# Patient Record
Sex: MALE | Race: WHITE | Employment: OTHER | URBAN - METROPOLITAN AREA
[De-identification: names, ages, dates, MRNs, and addresses within clinical notes are randomized per-mention and may not be internally consistent; named-entity substitution may affect disease eponyms.]

---

## 2017-03-29 ENCOUNTER — TELEPHONE (OUTPATIENT)
Dept: INTERNAL MEDICINE CLINIC | Age: 82
End: 2017-03-29

## 2017-03-29 DIAGNOSIS — E78.00 PURE HYPERCHOLESTEROLEMIA: ICD-10-CM

## 2017-03-29 DIAGNOSIS — I77.9 BILATERAL CAROTID ARTERY DISEASE (HCC): Primary | ICD-10-CM

## 2017-03-29 DIAGNOSIS — I25.10 CORONARY ARTERY DISEASE INVOLVING NATIVE CORONARY ARTERY OF NATIVE HEART WITHOUT ANGINA PECTORIS: ICD-10-CM

## 2017-03-29 NOTE — TELEPHONE ENCOUNTER
Pt requesting that the lab slip be faxed pt requesting lab slip be sent the wk before appt on Thursday, April 13, 2017 01:45 PM     Fax# [phone]

## 2017-03-30 DIAGNOSIS — R97.20 ABNORMAL PSA: ICD-10-CM

## 2017-03-30 DIAGNOSIS — M79.10 MYALGIA: Primary | ICD-10-CM

## 2017-03-30 NOTE — TELEPHONE ENCOUNTER
The patient has an appt with the urologist the following week after seeing Dr Martin Frey. Faxed the lab slips to the patient's home as requested.

## 2017-04-05 LAB
ALBUMIN SERPL-MCNC: 4.1 G/DL (ref 3.5–4.7)
ALBUMIN/GLOB SERPL: 1.6 {RATIO} (ref 1.2–2.2)
ALP SERPL-CCNC: 52 IU/L (ref 39–117)
ALT SERPL-CCNC: 24 IU/L (ref 0–44)
AST SERPL-CCNC: 26 IU/L (ref 0–40)
BASOPHILS # BLD AUTO: 0 X10E3/UL (ref 0–0.2)
BASOPHILS NFR BLD AUTO: 1 %
BILIRUB SERPL-MCNC: 1.5 MG/DL (ref 0–1.2)
BUN SERPL-MCNC: 15 MG/DL (ref 8–27)
BUN/CREAT SERPL: 20 (ref 10–24)
CALCIUM SERPL-MCNC: 9.1 MG/DL (ref 8.6–10.2)
CHLORIDE SERPL-SCNC: 103 MMOL/L (ref 96–106)
CHOLEST SERPL-MCNC: 159 MG/DL (ref 100–199)
CK SERPL-CCNC: 66 U/L (ref 24–204)
CO2 SERPL-SCNC: 26 MMOL/L (ref 18–29)
CREAT SERPL-MCNC: 0.74 MG/DL (ref 0.76–1.27)
EOSINOPHIL # BLD AUTO: 0.2 X10E3/UL (ref 0–0.4)
EOSINOPHIL NFR BLD AUTO: 4 %
ERYTHROCYTE [DISTWIDTH] IN BLOOD BY AUTOMATED COUNT: 13.8 % (ref 12.3–15.4)
GLOBULIN SER CALC-MCNC: 2.5 G/DL (ref 1.5–4.5)
GLUCOSE SERPL-MCNC: 92 MG/DL (ref 65–99)
HCT VFR BLD AUTO: 42.7 % (ref 37.5–51)
HDLC SERPL-MCNC: 109 MG/DL
HGB BLD-MCNC: 14 G/DL (ref 12.6–17.7)
IMM GRANULOCYTES # BLD: 0 X10E3/UL (ref 0–0.1)
IMM GRANULOCYTES NFR BLD: 0 %
INTERPRETATION, 910389: NORMAL
LDLC SERPL CALC-MCNC: 39 MG/DL (ref 0–99)
LYMPHOCYTES # BLD AUTO: 1.6 X10E3/UL (ref 0.7–3.1)
LYMPHOCYTES NFR BLD AUTO: 34 %
MCH RBC QN AUTO: 33.3 PG (ref 26.6–33)
MCHC RBC AUTO-ENTMCNC: 32.8 G/DL (ref 31.5–35.7)
MCV RBC AUTO: 102 FL (ref 79–97)
MONOCYTES # BLD AUTO: 0.6 X10E3/UL (ref 0.1–0.9)
MONOCYTES NFR BLD AUTO: 13 %
NEUTROPHILS # BLD AUTO: 2.3 X10E3/UL (ref 1.4–7)
NEUTROPHILS NFR BLD AUTO: 48 %
PLATELET # BLD AUTO: 149 X10E3/UL (ref 150–379)
POTASSIUM SERPL-SCNC: 4.8 MMOL/L (ref 3.5–5.2)
PROT SERPL-MCNC: 6.6 G/DL (ref 6–8.5)
PSA SERPL-MCNC: 11.6 NG/ML (ref 0–4)
RBC # BLD AUTO: 4.2 X10E6/UL (ref 4.14–5.8)
SODIUM SERPL-SCNC: 144 MMOL/L (ref 134–144)
TRIGL SERPL-MCNC: 56 MG/DL (ref 0–149)
VLDLC SERPL CALC-MCNC: 11 MG/DL (ref 5–40)
WBC # BLD AUTO: 4.8 X10E3/UL (ref 3.4–10.8)

## 2017-04-10 ENCOUNTER — TELEPHONE (OUTPATIENT)
Dept: INTERNAL MEDICINE CLINIC | Age: 82
End: 2017-04-10

## 2017-04-13 ENCOUNTER — OFFICE VISIT (OUTPATIENT)
Dept: INTERNAL MEDICINE CLINIC | Age: 82
End: 2017-04-13

## 2017-04-13 VITALS
HEART RATE: 56 BPM | SYSTOLIC BLOOD PRESSURE: 133 MMHG | RESPIRATION RATE: 16 BRPM | BODY MASS INDEX: 25.18 KG/M2 | HEIGHT: 69 IN | WEIGHT: 170 LBS | OXYGEN SATURATION: 98 % | DIASTOLIC BLOOD PRESSURE: 74 MMHG

## 2017-04-13 DIAGNOSIS — I49.3 FREQUENT PVCS: ICD-10-CM

## 2017-04-13 DIAGNOSIS — Z71.89 ADVANCED DIRECTIVES, COUNSELING/DISCUSSION: ICD-10-CM

## 2017-04-13 DIAGNOSIS — R00.0 TACHYCARDIA: ICD-10-CM

## 2017-04-13 DIAGNOSIS — I10 ESSENTIAL HYPERTENSION, BENIGN: Primary | ICD-10-CM

## 2017-04-13 DIAGNOSIS — Z00.00 ROUTINE GENERAL MEDICAL EXAMINATION AT A HEALTH CARE FACILITY: ICD-10-CM

## 2017-04-13 DIAGNOSIS — E78.00 PURE HYPERCHOLESTEROLEMIA: ICD-10-CM

## 2017-04-13 DIAGNOSIS — I25.10 CORONARY ARTERY DISEASE INVOLVING NATIVE CORONARY ARTERY OF NATIVE HEART WITHOUT ANGINA PECTORIS: ICD-10-CM

## 2017-04-13 DIAGNOSIS — Z13.39 SCREENING FOR ALCOHOLISM: ICD-10-CM

## 2017-04-13 RX ORDER — AZITHROMYCIN 250 MG/1
250 TABLET, FILM COATED ORAL SEE ADMIN INSTRUCTIONS
Qty: 6 TAB | Refills: 0 | Status: SHIPPED | OUTPATIENT
Start: 2017-04-13 | End: 2017-04-18

## 2017-04-13 NOTE — MR AVS SNAPSHOT
Visit Information Date & Time Provider Department Dept. Phone Encounter #  
 4/13/2017  1:45 PM Salbador Ramachandran, 819 [facility] [id number] Your Appointments 10/17/2017  1:15 PM  
PHYSICAL with Salbador Ramachandran MD  
[facility]) Appt Note: medicare wellness [place] [address] [facility] [address]  
[address] [facility] 121 [facility] 7 [address] Upcoming Health Maintenance Date Due  
 GLAUCOMA SCREENING Q2Y 12/11/2015 MEDICARE YEARLY EXAM 10/16/2016 DTaP/Tdap/Td series (2 - Td) 8/23/2018 Allergies as of 4/13/2017  Review Complete On: 10/27/2016 By: Duncan Hernandez LPN Severity Noted Reaction Type Reactions Zantac [Ranitidine Hcl]  08/23/2010    Rash Current Immunizations  Reviewed on 10/27/2016 Name Date Influenza High Dose Vaccine PF 10/27/2016, 9/30/2015 Influenza Vaccine 10/14/2013 Pneumococcal Conjugate (PCV-13) 9/30/2015 Pneumococcal Polysaccharide (PPSV-23) 12/29/2014 Pneumococcal Vaccine (Unspecified Type) 8/23/2008 TDAP Vaccine 8/23/2008 Zoster 8/23/2007 Not reviewed this visit You Were Diagnosed With   
  
 Codes Comments Essential hypertension, benign    -  Primary ICD-10-CM: I10 
ICD-9-CM: 401.1 Coronary artery disease involving native coronary artery of native heart without angina pectoris     ICD-10-CM: I25.10 ICD-9-CM: 414.01 Tachycardia     ICD-10-CM: R00.0 ICD-9-CM: 184. 0 Vitals BP Pulse Resp Height(growth percentile) Weight(growth percentile) SpO2  
 133/74 (!) 56 16 5' 9\" (1.753 m) 170 lb (77.1 kg) 98% BMI Smoking Status 25.1 kg/m2 Former Smoker Vitals History BMI and BSA Data Body Mass Index Body Surface Area  
 25.1 kg/m 2 1.94 m 2 Preferred Pharmacy Pharmacy Name Phone [address] [phone] Your Updated Medication List  
  
   
This list is accurate as of: 4/13/17  3:11 PM.  Always use your most recent med list. amLODIPine 5 mg tablet Commonly known as:  Len Hennessey TAKE 1 TABLET EVERY DAY  
  
 aspirin 81 mg chewable tablet Take 81 mg by mouth daily. atorvastatin 20 mg tablet Commonly known as:  LIPITOR Take 1 Tab by mouth daily. azithromycin 250 mg tablet Commonly known as:  Eda Slicker Take 1 Tab by mouth See Admin Instructions for 5 days. finasteride 5 mg tablet Commonly known as:  PROSCAR Take 1 Tab by mouth daily. fluticasone 50 mcg/actuation nasal spray Commonly known as:  Alberta Card 2 Sprays by Both Nostrils route daily. losartan 50 mg tablet Commonly known as:  COZAAR  
TAKE 1 TABLET EVERY DAY  
  
 omeprazole 40 mg capsule Commonly known as:  PRILOSEC Take 1 Cap by mouth daily. sildenafil citrate 100 mg tablet Commonly known as:  VIAGRA Take 1 Tab by mouth as needed. tamsulosin 0.4 mg capsule Commonly known as:  FLOMAX Take 1 Cap by mouth daily. trimethoprim-sulfamethoxazole  mg per tablet Commonly known as:  Lorenda Risen Prescriptions Sent to Pharmacy Refills  
 azithromycin (ZITHROMAX Z-YANN) 250 mg tablet 0 Sig: Take 1 Tab by mouth See Admin Instructions for 5 days. Class: Normal  
 Pharmacy: [facility] [address] [place], [place] [address]Amari JAKE, [address] Ph #: [phone] Route: Oral  
  
We Performed the Following AMB POC EKG ROUTINE W/ 12 LEADS, INTER & REP [22236 CPT(R)] Introducing [facility] & HEALTH SERVICES! Dear Radha Chiu: Thank you for requesting a [employer] account. Our records indicate that you already have an active [employer] account.   You can access your account anytime at [email or web address] Did you know that you can access your hospital and ER discharge instructions at any time in [employer]? You can also review all of your test results from your hospital stay or ER visit. Additional Information If you have questions, please visit the Frequently Asked Questions section of the [employer] website at [email or web address] Remember, [employer] is NOT to be used for urgent needs. For medical emergencies, dial 911. Now available from your iPhone and Android! Please provide this summary of care documentation to your next provider. Your primary care clinician is listed as Darryl Damian. If you have any questions after today's visit, please call [phone].

## 2017-04-13 NOTE — PROGRESS NOTES
Chief Complaint   Patient presents with    Follow-up     6 mo, discuss labs      SUBJECTIVE: Char Bravo is a 80 y.o. male seen for a follow up visit; he has hypertension, hyperlipidemia, coronary artery disease and no symptoms. Current Outpatient Prescriptions   Medication Sig Dispense Refill    amLODIPine (NORVASC) 5 mg tablet TAKE 1 TABLET EVERY DAY 90 Tab 1    losartan (COZAAR) 50 mg tablet TAKE 1 TABLET EVERY DAY 90 Tab 3    atorvastatin (LIPITOR) 20 mg tablet Take 1 Tab by mouth daily. 90 Tab 3    fluticasone (FLONASE) 50 mcg/actuation nasal spray 2 Sprays by Both Nostrils route daily. 1 Bottle 5    tamsulosin (FLOMAX) 0.4 mg capsule Take 1 Cap by mouth daily. 90 Cap 1    finasteride (PROSCAR) 5 mg tablet Take 1 Tab by mouth daily. 90 Tab 3    sildenafil citrate (VIAGRA) 100 mg tablet Take 1 Tab by mouth as needed. 10 Tab 3    omeprazole (PRILOSEC) 40 mg capsule Take 1 Cap by mouth daily. 90 Cap 1    trimethoprim-sulfamethoxazole (BACTRIM, SEPTRA)  mg per tablet       aspirin 81 mg chewable tablet Take 81 mg by mouth daily. Patient Active Problem List   Diagnosis Code    Essential hypertension, benign I10    CAD (coronary artery disease) I25.10    Rhinitis J31.0    ED (erectile dysfunction) N52.9    Abnormal PSA R97.8    HLD (hyperlipidemia) E78.5    BPH (benign prostatic hyperplasia) N40.0    GERD (gastroesophageal reflux disease) K21.9    Sarcopenia M62.84    Pancreatic cyst K86.2    Carotid artery disease ([facility] [address] I77.9    Coronary artery disease involving native coronary artery without angina pectoris I25.10     System Review: Cardiovascular ROS - taking medications as instructed, no medication side effects noted, no TIA's, no chest pain on exertion, no dyspnea on exertion, no swelling of ankles, CNS ROS - no TIA or stroke-like symptoms, no amaurosis, diplopia, abnormal speech, unilateral numbness or weakness.   New concerns: joint pain  Better  ask for z lane refill for his [place] trip next month. Left knee medial pain   Better  Wt Readings from Last 3 Encounters:   04/13/17 170 lb (77.1 kg)   10/27/16 170 lb (77.1 kg)   05/17/16 176 lb (79.8 kg)       Lightheaded   After exercise  Rare palpitation  OBJECTIVE:  Visit Vitals    /74    Pulse (!) 56    Resp 16    Ht 5' 9\" (1.753 m)    Wt 170 lb (77.1 kg)    SpO2 98%    BMI 25.1 kg/m2      Vitals:    04/13/17 1400   BP: 133/74   Pulse: (!) 56   Resp: 16   SpO2: 98%   Weight: 170 lb (77.1 kg)   Height: 5' 9\" (1.753 m)       Appearance: alert, well appearing, and in no distress, oriented to person, place, and time, normal appearing weight and acyanotic, in no respiratory distress. General exam: CVS exam BP noted to be well controlled today in office, S1, S2 normal, no gallop, no murmur, chest clear, no JVD, no HSM, no edema, peripheral vascular exam both carotids normal upstroke without bruits, radial pulses normal, femoral artery pulses normal, varicose veins noted, venous stasis dermatitis without ulcerations, pedal pulses normal both DP's and PT's.   Lab review: labs reviewed, I note that lipids LDL result meets goal, comprehensive metabolic panel normal,   Knee exam: the painful knee reveals antalgic gait, soft tissue tenderness over medial,      Lab Results   Component Value Date/Time    Cholesterol, total 159 04/04/2017 08:36 AM    HDL Cholesterol 109 04/04/2017 08:36 AM    LDL, calculated 39 04/04/2017 08:36 AM    VLDL, calculated 11 04/04/2017 08:36 AM    Triglyceride 56 04/04/2017 08:36 AM    CHOL/HDL Ratio 1.4 06/30/2010 08:37 AM     Lab Results   Component Value Date/Time    GFR est AA 99 04/04/2017 08:36 AM    GFR est non-AA 86 04/04/2017 08:36 AM    Creatinine 0.74 04/04/2017 08:36 AM    BUN 15 04/04/2017 08:36 AM    Sodium 144 04/04/2017 08:36 AM    Potassium 4.8 04/04/2017 08:36 AM    Chloride 103 04/04/2017 08:36 AM    CO2 26 04/04/2017 08:36 AM     Lab Results   Component Value Date/Time    WBC 4.8 04/04/2017 08:36 AM    WBC 5.4 05/15/2012 12:00 AM    HGB 14.0 04/04/2017 08:36 AM    HCT 42.7 04/04/2017 08:36 AM    PLATELET 457 56/50/8822 08:36 AM     04/04/2017 08:36 AM     Lab Results   Component Value Date/Time    Prostate Specific Ag 11.6 04/04/2017 08:34 AM    Prostate Specific Ag 10.2 02/01/2012 12:00 AM    Prostate Specific Ag 8.1 07/26/2011 11:02 AM    Prostate Specific Ag 3.9 06/30/2010 08:37 AM    Prostate Specific Ag 4.0 11/02/2009 08:56 AM         ASSESSMENT:  hypertension well controlled, stable, hyperlipidemia well controlled, stable. risk factors reviewed  Knee better  PLAN:    See urology for PSA that he requested   Modify alcohol  Knee better but avoid pounding        Results for orders placed or performed in visit on 03/30/17   PROSTATE SPECIFIC AG (PSA)   Result Value Ref Range    Prostate Specific Ag 11.6 (H) 0.0 - 4.0 ng/mL   CK   Result Value Ref Range    Creatine Kinase,Total 66 24 - 204 U/L     EKG: normal EKG, normal sinus rhythm, frequent PVC's noted. Tomy Bowden was seen today for follow-up. Diagnoses and all orders for this visit:    Essential hypertension, benign    Coronary artery disease involving native coronary artery of native heart without angina pectoris    Tachycardia  -     AMB POC EKG ROUTINE W/ 12 LEADS, INTER & REP    Frequent PVCs    Pure hypercholesterolemia    Other orders  -     azithromycin (ZITHROMAX Z-YANN) 250 mg tablet; Take 1 Tab by mouth See Admin Instructions for 5 days. This is a Subsequent Medicare Annual Wellness Visit providing Personalized Prevention Plan Services (PPPS) (Performed 12 months after initial AWV and PPPS )    I have reviewed the patient's medical history in detail and updated the computerized patient record.      History     Past Medical History:   Diagnosis Date    Abnormal PSA 5/9/2011    BPH (benign prostatic hyperplasia)     CAD (coronary artery disease)     Carotid artery disease ([facility] [address] 4/14/2014    ED (erectile dysfunction)     GERD (gastroesophageal reflux disease) 5/21/2012    Hypercholesterolemia     Hypertension     Pancreatic cyst 10/21/2013    Rhinitis 8/23/2010    Sarcopenia 7/15/2013      Past Surgical History:   Procedure Laterality Date    ENDOSCOPY, COLON, DIAGNOSTIC  2012    HX ORTHOPAEDIC  1951    cervical fracture     Current Outpatient Prescriptions   Medication Sig Dispense Refill    azithromycin (ZITHROMAX Z-YANN) 250 mg tablet Take 1 Tab by mouth See Admin Instructions for 5 days. 6 Tab 0    amLODIPine (NORVASC) 5 mg tablet TAKE 1 TABLET EVERY DAY 90 Tab 1    losartan (COZAAR) 50 mg tablet TAKE 1 TABLET EVERY DAY 90 Tab 3    atorvastatin (LIPITOR) 20 mg tablet Take 1 Tab by mouth daily. 90 Tab 3    fluticasone (FLONASE) 50 mcg/actuation nasal spray 2 Sprays by Both Nostrils route daily. 1 Bottle 5    tamsulosin (FLOMAX) 0.4 mg capsule Take 1 Cap by mouth daily. 90 Cap 1    finasteride (PROSCAR) 5 mg tablet Take 1 Tab by mouth daily. 90 Tab 3    sildenafil citrate (VIAGRA) 100 mg tablet Take 1 Tab by mouth as needed. 10 Tab 3    omeprazole (PRILOSEC) 40 mg capsule Take 1 Cap by mouth daily. 90 Cap 1    trimethoprim-sulfamethoxazole (BACTRIM, SEPTRA)  mg per tablet       aspirin 81 mg chewable tablet Take 81 mg by mouth daily. Allergies   Allergen Reactions    Zantac [Ranitidine Hcl] Rash     No family history on file.   Social History   Substance Use Topics    Smoking status: Former Smoker     Quit date: 8/23/1965    Smokeless tobacco: Never Used    Alcohol use 7.0 oz/week     14 Shots of liquor per week     Patient Active Problem List   Diagnosis Code    Essential hypertension, benign I10    CAD (coronary artery disease) I25.10    Rhinitis J31.0    ED (erectile dysfunction) N52.9    Abnormal PSA R97.8    HLD (hyperlipidemia) E78.5    BPH (benign prostatic hyperplasia) N40.0    GERD (gastroesophageal reflux disease) K21.9    Sarcopenia M62.84    Pancreatic cyst K86.2    Carotid artery disease (HCC) I77.9    Coronary artery disease involving native coronary artery without angina pectoris I25.10       Depression Risk Factor Screening:     PHQ 2 / 9, over the last two weeks 10/16/2015   Little interest or pleasure in doing things Not at all   Feeling down, depressed or hopeless Not at all   Total Score PHQ 2 0     Alcohol Risk Factor Screening: On any occasion during the past 3 months, have you had more than 4 drinks containing alcohol? Yes    Do you average more than 14 drinks per week? No      Functional Ability and Level of Safety:     Hearing Loss   mild    Activities of Daily Living   Self-care. Requires assistance with: no ADLs    Fall Risk     Fall Risk Assessment, last 12 mths 10/16/2015   Able to walk? Yes   Fall in past 12 months? No     Abuse Screen   Patient is not abused    Review of Systems   A comprehensive review of systems was negative except for that written in the HPI. Physical Examination     Evaluation of Cognitive Function:  Mood/affect:  neutral  Appearance: age appropriate, well dressed and within normal Limits  Family member/caregiver input: none    Visit Vitals    /74    Pulse (!) 56    Resp 16    Ht 5' 9\" (1.753 m)    Wt 170 lb (77.1 kg)    SpO2 98%    BMI 25.1 kg/m2     General appearance: alert, cooperative, no distress, appears stated age    Patient Care Team:  Jesu Hackett MD as PCP - General    Advice/Referrals/Counseling   Education and counseling provided:  Are appropriate based on today's review and evaluation  End-of-Life planning (with patient's consent)  Pneumococcal Vaccine  Influenza Vaccine  reduce alcohol intake      Assessment/Plan   .         Advance Care Planning (ACP) Provider Conversation Snapshot    Date of ACP Conversation: 04/15/17  Persons included in Conversation:  patient  Length of ACP Conversation in minutes:  <16 minutes (Non-Billable)    Authorized Decision Maker (if patient is incapable of making informed decisions): This person is: Other Legally Authorized Decision Maker (e.g. Next of Kin)          For Patients with Decision Making Capacity:   Values/Goals: Exploration of values, goals, and preferences if recovery is not expected, even with continued medical treatment in the event of:  Imminent death  Severe, permanent brain injury    Conversation Outcomes / Follow-Up Plan:   Recommended review of completed ACP document annually or upon change in health status  Other Treatment or Goals of Care Decisions:      Jessica Mancera was seen today for follow-up. Diagnoses and all orders for this visit:    Essential hypertension, benign    Coronary artery disease involving native coronary artery of native heart without angina pectoris    Tachycardia  -     AMB POC EKG ROUTINE W/ 12 LEADS, INTER & REP    Frequent PVCs    Pure hypercholesterolemia    Routine general medical examination at a health care facility    Screening for alcoholism    Advanced directives, counseling/discussion    Other orders  -     azithromycin (ZITHROMAX Z-YANN) 250 mg tablet; Take 1 Tab by mouth See Admin Instructions for 5 days.

## 2017-05-24 ENCOUNTER — HOSPITAL ENCOUNTER (OUTPATIENT)
Dept: LAB | Age: 82
Discharge: HOME | End: 2017-05-24

## 2017-06-27 ENCOUNTER — OFFICE VISIT (OUTPATIENT)
Dept: INTERNAL MEDICINE CLINIC | Age: 82
End: 2017-06-27

## 2017-06-27 VITALS
TEMPERATURE: 97.9 F | HEART RATE: 47 BPM | WEIGHT: 170 LBS | SYSTOLIC BLOOD PRESSURE: 149 MMHG | DIASTOLIC BLOOD PRESSURE: 79 MMHG | BODY MASS INDEX: 25.18 KG/M2 | HEIGHT: 69 IN | RESPIRATION RATE: 15 BRPM | OXYGEN SATURATION: 98 %

## 2017-06-27 DIAGNOSIS — I10 ESSENTIAL HYPERTENSION, BENIGN: ICD-10-CM

## 2017-06-27 DIAGNOSIS — M17.12 ARTHRITIS OF KNEE, LEFT: Primary | ICD-10-CM

## 2017-06-27 RX ORDER — TRIAMCINOLONE ACETONIDE 40 MG/ML
60 INJECTION, SUSPENSION INTRA-ARTICULAR; INTRAMUSCULAR ONCE
Qty: 1 ML | Refills: 0
Start: 2017-06-27 | End: 2017-06-27

## 2017-06-27 RX ORDER — AMLODIPINE BESYLATE 5 MG/1
TABLET ORAL
Qty: 90 TAB | Refills: 1 | Status: SHIPPED | OUTPATIENT
Start: 2017-06-27 | End: 2017-10-09 | Stop reason: SDUPTHER

## 2017-06-27 NOTE — MR AVS SNAPSHOT
Visit Information Date & Time Provider Department Dept. Phone Encounter #  
 6/27/2017  3:45 PM Tong Mendez, 819 [facility] [phone] [id number] Your Appointments 10/17/2017  1:15 PM  
PHYSICAL with Tong Mendez MD  
[facility]) Appt Note: medicare wellness [place] [address] [facility] [id number] [place] [address] [address] [facility] [address] [facility] [address] Upcoming Health Maintenance Date Due  
 GLAUCOMA SCREENING Q2Y 12/11/2015 INFLUENZA AGE 9 TO ADULT 8/1/2017 MEDICARE YEARLY EXAM 4/14/2018 DTaP/Tdap/Td series (2 - Td) 8/23/2018 Allergies as of 6/27/2017  Review Complete On: 6/27/2017 By: Milly Campos LPN Severity Noted Reaction Type Reactions Zantac [Ranitidine Hcl]  08/23/2010    Rash Current Immunizations  Reviewed on 10/27/2016 Name Date Influenza High Dose Vaccine PF 10/27/2016, 9/30/2015 Influenza Vaccine 10/14/2013 Pneumococcal Conjugate (PCV-13) 9/30/2015 Pneumococcal Polysaccharide (PPSV-23) 12/29/2014 Pneumococcal Vaccine (Unspecified Type) 8/23/2008 TDAP Vaccine 8/23/2008 Zoster 8/23/2007 Not reviewed this visit You Were Diagnosed With   
  
 Codes Comments Arthritis of knee, left    -  Primary ICD-10-CM: M17.12 
ICD-9-CM: 716.96 Vitals BP Pulse Temp Resp Height(growth percentile) Weight(growth percentile) 149/79 (BP 1 Location: Left arm, BP Patient Position: Sitting) (!) 47 97.9 °F (36.6 °C) (Oral) 15 5' 9\" (1.753 m) 170 lb (77.1 kg) SpO2 BMI Smoking Status 98% 25.1 kg/m2 Former Smoker Vitals History BMI and BSA Data Body Mass Index Body Surface Area  
 25.1 kg/m 2 1.94 m 2 Preferred Pharmacy Pharmacy Name Phone [address] [phone] Your Updated Medication List  
  
   
This list is accurate as of: 6/27/17  4:41 PM.  Always use your most recent med list. amLODIPine 5 mg tablet Commonly known as:  Domenico Ape TAKE 1 TABLET EVERY DAY  
  
 aspirin 81 mg chewable tablet Take 81 mg by mouth daily. atorvastatin 20 mg tablet Commonly known as:  LIPITOR Take 1 Tab by mouth daily. finasteride 5 mg tablet Commonly known as:  PROSCAR Take 1 Tab by mouth daily. fluticasone 50 mcg/actuation nasal spray Commonly known as:  [place] Toni 2 Sprays by Both Nostrils route daily. losartan 50 mg tablet Commonly known as:  COZAAR  
TAKE 1 TABLET EVERY DAY  
  
 omeprazole 40 mg capsule Commonly known as:  PRILOSEC Take 1 Cap by mouth daily. sildenafil citrate 100 mg tablet Commonly known as:  VIAGRA Take 1 Tab by mouth as needed. tamsulosin 0.4 mg capsule Commonly known as:  FLOMAX Take 1 Cap by mouth daily. trimethoprim-sulfamethoxazole  mg per tablet Commonly known as:  Emily Ramirez To-Do List   
 06/27/2017 Imaging:  XR KNEE LT MAX 2 VWS Introducing [facility] & HEALTH SERVICES! Dear [facility] [address]: Thank you for requesting a [employer] account. Our records indicate that you already have an active [employer] account. You can access your account anytime at [email or web address] Did you know that you can access your hospital and ER discharge instructions at any time in [employer]? You can also review all of your test results from your hospital stay or ER visit. Additional Information If you have questions, please visit the Frequently Asked Questions section of the [employer] website at [email or web address] Remember, [employer] is NOT to be used for urgent needs. For medical emergencies, dial 911. Now available from your iPhone and Android! Please provide this summary of care documentation to your next provider. Your primary care clinician is listed as Vivian Fraser. If you have any questions after today's visit, please call [phone].

## 2017-06-27 NOTE — PROGRESS NOTES
Rheumatological ROS: ongoing significant pain in knees which is stable and controlled by PRN meds. New concerns - left knee slows him down. Exam: multiple trigger point tenderness, mild general joint tenderness, Heberden's nodes, Henry's nodes and reduced range of motion of left knee. Assessment:  osteoarthritis borderline controlled. Plan: orders as documented in EMR, the following changes are made - inject moraima  Xray  Later ortho referral.      A steroid injection was performed at knee medial using 1% plain Lidocaine and 60 mg of Kenalog. This was well tolerated. Fernando Ortiz was seen today for knee pain. Diagnoses and all orders for this visit:    Arthritis of knee, left  -     XR KNEE LT MAX 2 VWS; Future  -     TRIAMCINOLONE ACETONIDE INJ  -     triamcinolone acetonide (KENALOG) 40 mg/mL injection; 1.5 mL by Intra artICUlar route once for 1 dose.   -     20610 - DRAIN/INJECT LARGE JOINT/BURSA      Ortho prn    [facility]  OFFICE PROCEDURE PROGRESS NOTE        Chart reviewed for the following:   Ada Spaulding MD, have reviewed the History, Physical and updated the Allergic reactions for [address] [place] [address] Blvd performed immediately prior to start of procedure:   I, Ghazal Fernández MD, have performed the following reviews on [facility] prior to the start of the procedure:            * Patient was identified by name and date of birth   * Agreement on procedure being performed was verified  * Risks and Benefits explained to the patient  * Procedure site verified and marked as necessary  * Patient was positioned for comfort  * Consent was signed and verified     Time:       Date of procedure: 6/27/2017    Procedure performed by:  Ghazal Fernández MD    Provider assisted by:   Patient assisted by: self    How tolerated by patient: tolerated the procedure well with no complications    Post Procedural Pain Scale: 0 - No Hurt    Comments: none

## 2017-09-26 RX ORDER — DICLOFENAC SODIUM 10 MG/G
2 GEL TOPICAL 4 TIMES DAILY
Qty: 1 EACH | Refills: 2 | Status: SHIPPED | OUTPATIENT
Start: 2017-09-26 | End: 2018-04-26 | Stop reason: ALTCHOICE

## 2017-10-09 DIAGNOSIS — I10 ESSENTIAL HYPERTENSION, BENIGN: ICD-10-CM

## 2017-10-09 RX ORDER — ATORVASTATIN CALCIUM 20 MG/1
TABLET, FILM COATED ORAL
Qty: 90 TAB | Refills: 3 | Status: SHIPPED | OUTPATIENT
Start: 2017-10-09 | End: 2018-06-21 | Stop reason: SDUPTHER

## 2017-10-09 RX ORDER — AMLODIPINE BESYLATE 5 MG/1
TABLET ORAL
Qty: 90 TAB | Refills: 1 | Status: SHIPPED | OUTPATIENT
Start: 2017-10-09 | End: 2018-04-24 | Stop reason: SDUPTHER

## 2017-10-10 ENCOUNTER — TELEPHONE (OUTPATIENT)
Dept: INTERNAL MEDICINE CLINIC | Age: 82
End: 2017-10-10

## 2017-10-10 DIAGNOSIS — I77.9 BILATERAL CAROTID ARTERY DISEASE (HCC): Primary | ICD-10-CM

## 2017-10-10 DIAGNOSIS — E78.00 PURE HYPERCHOLESTEROLEMIA: ICD-10-CM

## 2017-10-10 DIAGNOSIS — I10 ESSENTIAL HYPERTENSION, BENIGN: ICD-10-CM

## 2017-10-11 DIAGNOSIS — R97.20 ABNORMAL PSA: Primary | ICD-10-CM

## 2017-10-13 ENCOUNTER — TELEPHONE (OUTPATIENT)
Dept: INTERNAL MEDICINE CLINIC | Age: 82
End: 2017-10-13

## 2017-10-13 LAB
ALBUMIN SERPL-MCNC: 4 G/DL (ref 3.5–4.7)
ALBUMIN/GLOB SERPL: 1.5 {RATIO} (ref 1.2–2.2)
ALP SERPL-CCNC: 56 IU/L (ref 39–117)
ALT SERPL-CCNC: 24 IU/L (ref 0–44)
AST SERPL-CCNC: 29 IU/L (ref 0–40)
BASOPHILS # BLD AUTO: 0 X10E3/UL (ref 0–0.2)
BASOPHILS NFR BLD AUTO: 0 %
BILIRUB SERPL-MCNC: 1.9 MG/DL (ref 0–1.2)
BUN SERPL-MCNC: 19 MG/DL (ref 8–27)
BUN/CREAT SERPL: 22 (ref 10–24)
CALCIUM SERPL-MCNC: 9.2 MG/DL (ref 8.6–10.2)
CHLORIDE SERPL-SCNC: 105 MMOL/L (ref 96–106)
CHOLEST SERPL-MCNC: 161 MG/DL (ref 100–199)
CO2 SERPL-SCNC: 27 MMOL/L (ref 18–29)
CREAT SERPL-MCNC: 0.87 MG/DL (ref 0.76–1.27)
EOSINOPHIL # BLD AUTO: 0.2 X10E3/UL (ref 0–0.4)
EOSINOPHIL NFR BLD AUTO: 5 %
ERYTHROCYTE [DISTWIDTH] IN BLOOD BY AUTOMATED COUNT: 13.9 % (ref 12.3–15.4)
GLOBULIN SER CALC-MCNC: 2.6 G/DL (ref 1.5–4.5)
GLUCOSE SERPL-MCNC: 91 MG/DL (ref 65–99)
HCT VFR BLD AUTO: 42 % (ref 37.5–51)
HDLC SERPL-MCNC: 111 MG/DL
HGB BLD-MCNC: 13.7 G/DL (ref 12.6–17.7)
IMM GRANULOCYTES # BLD: 0 X10E3/UL (ref 0–0.1)
IMM GRANULOCYTES NFR BLD: 0 %
INTERPRETATION, 910389: NORMAL
LDLC SERPL CALC-MCNC: 41 MG/DL (ref 0–99)
LYMPHOCYTES # BLD AUTO: 1.8 X10E3/UL (ref 0.7–3.1)
LYMPHOCYTES NFR BLD AUTO: 37 %
MCH RBC QN AUTO: 32.8 PG (ref 26.6–33)
MCHC RBC AUTO-ENTMCNC: 32.6 G/DL (ref 31.5–35.7)
MCV RBC AUTO: 101 FL (ref 79–97)
MONOCYTES # BLD AUTO: 0.7 X10E3/UL (ref 0.1–0.9)
MONOCYTES NFR BLD AUTO: 14 %
NEUTROPHILS # BLD AUTO: 2.2 X10E3/UL (ref 1.4–7)
NEUTROPHILS NFR BLD AUTO: 44 %
PLATELET # BLD AUTO: 152 X10E3/UL (ref 150–379)
POTASSIUM SERPL-SCNC: 5 MMOL/L (ref 3.5–5.2)
PROT SERPL-MCNC: 6.6 G/DL (ref 6–8.5)
PSA SERPL-MCNC: 11.2 NG/ML (ref 0–4)
RBC # BLD AUTO: 4.18 X10E6/UL (ref 4.14–5.8)
SODIUM SERPL-SCNC: 145 MMOL/L (ref 134–144)
TRIGL SERPL-MCNC: 43 MG/DL (ref 0–149)
VLDLC SERPL CALC-MCNC: 9 MG/DL (ref 5–40)
WBC # BLD AUTO: 5 X10E3/UL (ref 3.4–10.8)

## 2017-10-13 NOTE — TELEPHONE ENCOUNTER
Notified the patient per Dr Hira Duong that the xray shows minor arthritis. He states understanding. Faxed a copy of the xray and his recent lab work to him at [phone].

## 2017-10-16 ENCOUNTER — TELEPHONE (OUTPATIENT)
Dept: INTERNAL MEDICINE CLINIC | Age: 82
End: 2017-10-16

## 2017-10-16 NOTE — TELEPHONE ENCOUNTER
Patient is asking for you to fax again his results becaeuse he did not get them the first time   Fax number [phone]

## 2017-10-17 ENCOUNTER — OFFICE VISIT (OUTPATIENT)
Dept: INTERNAL MEDICINE CLINIC | Age: 82
End: 2017-10-17

## 2017-10-17 VITALS
DIASTOLIC BLOOD PRESSURE: 74 MMHG | RESPIRATION RATE: 16 BRPM | HEART RATE: 72 BPM | SYSTOLIC BLOOD PRESSURE: 138 MMHG | HEIGHT: 69 IN | OXYGEN SATURATION: 98 % | WEIGHT: 167 LBS | BODY MASS INDEX: 24.73 KG/M2

## 2017-10-17 DIAGNOSIS — I10 ESSENTIAL HYPERTENSION, BENIGN: ICD-10-CM

## 2017-10-17 DIAGNOSIS — E78.00 PURE HYPERCHOLESTEROLEMIA: ICD-10-CM

## 2017-10-17 DIAGNOSIS — M17.12 ARTHRITIS OF KNEE, LEFT: Primary | ICD-10-CM

## 2017-10-17 DIAGNOSIS — I25.10 CORONARY ARTERY DISEASE INVOLVING NATIVE CORONARY ARTERY OF NATIVE HEART WITHOUT ANGINA PECTORIS: ICD-10-CM

## 2017-10-17 DIAGNOSIS — R97.20 ABNORMAL PSA: ICD-10-CM

## 2017-10-17 NOTE — PROGRESS NOTES
Coordination of Care Questions    1. Have you been to the ER, urgent care clinic since your last visit? No       Hospitalized since your last visit? No    2. Have you seen or consulted any other health care providers outside of the [address] [place] [address] since your last visit? Include any pap smears or colon screening.  No

## 2017-10-17 NOTE — PROGRESS NOTES
SUBJECTIVE: Dian Quiros is a 80 y.o. male seen for a follow up visit; he has hypertension, hyperlipidemia, coronary artery disease and no symptoms. Current Outpatient Prescriptions   Medication Sig Dispense Refill    atorvastatin (LIPITOR) 20 mg tablet TAKE 1 TABLET EVERY DAY 90 Tab 3    amLODIPine (NORVASC) 5 mg tablet TAKE 1 TABLET EVERY DAY 90 Tab 1    diclofenac (VOLTAREN) 1 % gel Apply 2 g to affected area four (4) times daily. 1 Each 2    losartan (COZAAR) 50 mg tablet TAKE 1 TABLET EVERY DAY 90 Tab 3    fluticasone (FLONASE) 50 mcg/actuation nasal spray 2 Sprays by Both Nostrils route daily. 1 Bottle 5    tamsulosin (FLOMAX) 0.4 mg capsule Take 1 Cap by mouth daily. 90 Cap 1    finasteride (PROSCAR) 5 mg tablet Take 1 Tab by mouth daily. 90 Tab 3    sildenafil citrate (VIAGRA) 100 mg tablet Take 1 Tab by mouth as needed. 10 Tab 3    omeprazole (PRILOSEC) 40 mg capsule Take 1 Cap by mouth daily. [address] [place] 2017-18, PF, syrg injection ADM 0.5ML IM UTD  0    trimethoprim-sulfamethoxazole (BACTRIM, SEPTRA)  mg per tablet       aspirin 81 mg chewable tablet Take 81 mg by mouth daily.        Patient Active Problem List   Diagnosis Code    Essential hypertension, benign I10    CAD (coronary artery disease) I25.10    Rhinitis J31.0    ED (erectile dysfunction) N52.9    Abnormal PSA R97.20    HLD (hyperlipidemia) E78.5    BPH (benign prostatic hyperplasia) N40.0    GERD (gastroesophageal reflux disease) K21.9    Sarcopenia M62.84    Pancreatic cyst K86.2    Carotid artery disease ([facility] [address] I77.9    Coronary artery disease involving native coronary artery without angina pectoris I25.10     System Review: Cardiovascular ROS - taking medications as instructed, no medication side effects noted, no TIA's, no chest pain on exertion, no dyspnea on exertion, no swelling of ankles, CNS ROS - no TIA or stroke-like symptoms, no amaurosis, diplopia, abnormal speech, unilateral numbness or weakness. New concerns: joint pain   Left knee medial pain weeks worse with treadmill and stairs up and down 2 steroid shots in past  OBJECTIVE:  Visit Vitals    /74    Pulse 72    Resp 16    Ht 5' 9\" (1.753 m)    Wt 167 lb (75.8 kg)    SpO2 98%    BMI 24.66 kg/m2      Vitals:    10/17/17 1326   BP: 138/74   Pulse: 72   Resp: 16   SpO2: 98%   Weight: 167 lb (75.8 kg)   Height: 5' 9\" (1.753 m)       Appearance: alert, well appearing, and in no distress, oriented to person, place, and time, normal appearing weight and acyanotic, in no respiratory distress. General exam: CVS exam BP noted to be well controlled today in office, S1, S2 normal, no gallop, no murmur, chest clear, no JVD, no HSM, no edema, peripheral vascular exam both carotids normal upstroke without bruits, radial pulses normal, femoral artery pulses normal, varicose veins noted, venous stasis dermatitis without ulcerations, pedal pulses normal both DP's and PT's. Lab review: labs reviewed, I note that lipids LDL result meets goal, comprehensive metabolic panel normal,   Knee exam: the painful knee reveals antalgic gait, soft tissue tenderness over medial, reduced range of motion.  X-ray is pending  Had 2 injection xray mild  Narrowing medially    Lab Results   Component Value Date/Time    Cholesterol, total 161 10/12/2017 08:27 AM    HDL Cholesterol 111 10/12/2017 08:27 AM    LDL, calculated 41 10/12/2017 08:27 AM    VLDL, calculated 9 10/12/2017 08:27 AM    Triglyceride 43 10/12/2017 08:27 AM    CHOL/HDL Ratio 1.4 06/30/2010 08:37 AM     Lab Results   Component Value Date/Time    GFR est AA 92 10/12/2017 08:27 AM    GFR est non-AA 80 10/12/2017 08:27 AM    Creatinine 0.87 10/12/2017 08:27 AM    BUN 19 10/12/2017 08:27 AM    Sodium 145 10/12/2017 08:27 AM    Potassium 5.0 10/12/2017 08:27 AM    Chloride 105 10/12/2017 08:27 AM    CO2 27 10/12/2017 08:27 AM     Lab Results   Component Value Date/Time    WBC 5.0 10/12/2017 08:27 AM    WBC 5.4 05/15/2012 12:00 AM    HGB 13.7 10/12/2017 08:27 AM    HCT 42.0 10/12/2017 08:27 AM    PLATELET 824 77/96/9946 08:27 AM     10/12/2017 08:27 AM     Lab Results   Component Value Date/Time    Prostate Specific Ag 11.2 10/12/2017 08:26 AM    Prostate Specific Ag 11.6 04/04/2017 08:34 AM    Prostate Specific Ag 10.2 02/01/2012 12:00 AM    Prostate Specific Ag 3.9 06/30/2010 08:37 AM    Prostate Specific Ag 4.0 11/02/2009 08:56 AM         ASSESSMENT:  hypertension well controlled, stable, hyperlipidemia well controlled, stable. PLAN:            Hypertension controlled for age based on guidelines  1. Arthritis of knee, left  See [facility]    2. Coronary artery disease involving native coronary artery of native heart without angina pectoris  stable    3. Pure hypercholesterolemia  controlled    4. Abnormal PSA  Follow urology may need turp for symptoms  Daily cialis  A thought    5.  Essential hypertension, benign  controlled

## 2017-10-17 NOTE — MR AVS SNAPSHOT
Visit Information Date & Time Provider Department Dept. Phone Encounter #  
 10/17/2017  1:15 PM Atul Corona, [address] [facility] [phone] [id number] Your Appointments 4/17/2018 11:15 AM  
ROUTINE CARE with Atul Corona MD  
[facility] [address]) Appt Note: 6 MONTH F/U  
 [place] [address] [facility] [id number] [place] [address] [address] [facility] 121 [facility] 7 [id number] Upcoming Health Maintenance Date Due  
 GLAUCOMA SCREENING Q2Y 12/11/2015 MEDICARE YEARLY EXAM 4/14/2018 DTaP/Tdap/Td series (2 - Td) 8/23/2018 Allergies as of 10/17/2017  Review Complete On: 10/17/2017 By: Frankey Kaska, LPN Severity Noted Reaction Type Reactions Zantac [Ranitidine Hcl]  08/23/2010    Rash Current Immunizations  Reviewed on 9/30/2017 Name Date Influenza High Dose Vaccine PF 9/28/2017, 10/27/2016, 9/30/2015 Influenza Vaccine 10/14/2013 Pneumococcal Conjugate (PCV-13) 9/30/2015 Pneumococcal Polysaccharide (PPSV-23) 12/29/2014 TDAP Vaccine 8/23/2008 ZZZ-RETIRED (DO NOT USE) Pneumococcal Vaccine (Unspecified Type) 8/23/2008 Zoster 8/23/2007 Not reviewed this visit You Were Diagnosed With   
  
 Codes Comments Arthritis of knee, left    -  Primary ICD-10-CM: M17.12 
ICD-9-CM: 716.96 Vitals BP Pulse Resp Height(growth percentile) Weight(growth percentile) SpO2  
 138/74 72 16 5' 9\" (1.753 m) 167 lb (75.8 kg) 98% BMI Smoking Status 24.66 kg/m2 Former Smoker Vitals History BMI and BSA Data Body Mass Index Body Surface Area  
 24.66 kg/m 2 1.92 m 2 Preferred Pharmacy Pharmacy Name Phone [facility]  [address] [phone] Your Updated Medication List  
  
   
This list is accurate as of: 10/17/17  1:59 PM.  Always use your most recent med list. amLODIPine 5 mg tablet Commonly known as:  Ren Fanti TAKE 1 TABLET EVERY DAY  
  
 aspirin 81 mg chewable tablet Take 81 mg by mouth daily. atorvastatin 20 mg tablet Commonly known as:  LIPITOR  
TAKE 1 TABLET EVERY DAY  
  
 diclofenac 1 % Gel Commonly known as:  VOLTAREN Apply 2 g to affected area four (4) times daily. finasteride 5 mg tablet Commonly known as:  PROSCAR Take 1 Tab by mouth daily. fluticasone 50 mcg/actuation nasal spray Commonly known as:  Jamel Cyphers 2 Sprays by Both Nostrils route daily. FLUZONE HIGH-DOSE 2017-18 (PF) Syrg injection Generic drug:  influenza vaccine 2017-18 (65 yrs+)(PF)  
ADM 0.5ML IM UTD  
  
 losartan 50 mg tablet Commonly known as:  COZAAR  
TAKE 1 TABLET EVERY DAY  
  
 omeprazole 40 mg capsule Commonly known as:  PRILOSEC Take 1 Cap by mouth daily. sildenafil citrate 100 mg tablet Commonly known as:  VIAGRA Take 1 Tab by mouth as needed. tamsulosin 0.4 mg capsule Commonly known as:  FLOMAX Take 1 Cap by mouth daily. trimethoprim-sulfamethoxazole  mg per tablet Commonly known as:  Richard Lacey We Performed the Following REFERRAL TO ORTHOPEDICS [FTW186 Custom] Referral Information Referral ID Referred By Referred To  
  
 [id number] Maricarmen Iqbal MD   
   [address] Phone: [phone] Fax: [phone] Visits Status Start Date End Date 1 New Request 10/17/17 10/17/18 If your referral has a status of pending review or denied, additional information will be sent to support the outcome of this decision. Introducing [facility] & HEALTH SERVICES! Dear Abida Carranza: Thank you for requesting a Sunfire account. Our records indicate that you already have an active Sunfire account. You can access your account anytime at [email or web address]. Entertainment Magpie/Earlier Media Did you know that you can access your hospital and ER discharge instructions at any time in Funzio? You can also review all of your test results from your hospital stay or ER visit. Additional Information If you have questions, please visit the Frequently Asked Questions section of the Funzio website at [email or web address] Remember, Funzio is NOT to be used for urgent needs. For medical emergencies, dial 911. Now available from your iPhone and Android! Please provide this summary of care documentation to your next provider. Your primary care clinician is listed as Norman Alatorre. If you have any questions after today's visit, please call [phone].

## 2018-02-06 DIAGNOSIS — I10 ESSENTIAL HYPERTENSION, BENIGN: ICD-10-CM

## 2018-02-06 RX ORDER — FLUTICASONE PROPIONATE 50 MCG
2 SPRAY, SUSPENSION (ML) NASAL DAILY
Qty: 3 BOTTLE | Refills: 1 | Status: SHIPPED | OUTPATIENT
Start: 2018-02-06 | End: 2018-09-28 | Stop reason: SDUPTHER

## 2018-02-06 RX ORDER — FINASTERIDE 5 MG/1
5 TABLET, FILM COATED ORAL DAILY
Qty: 90 TAB | Refills: 1 | Status: SHIPPED | OUTPATIENT
Start: 2018-02-06 | End: 2018-09-28 | Stop reason: SDUPTHER

## 2018-02-06 RX ORDER — LOSARTAN POTASSIUM 50 MG/1
TABLET ORAL
Qty: 90 TAB | Refills: 3 | Status: SHIPPED | OUTPATIENT
Start: 2018-02-06 | End: 2018-04-24 | Stop reason: SDUPTHER

## 2018-03-26 ENCOUNTER — TELEPHONE (OUTPATIENT)
Dept: INTERNAL MEDICINE CLINIC | Age: 83
End: 2018-03-26

## 2018-03-26 DIAGNOSIS — E78.00 PURE HYPERCHOLESTEROLEMIA: ICD-10-CM

## 2018-03-26 DIAGNOSIS — I25.10 CORONARY ARTERY DISEASE INVOLVING NATIVE CORONARY ARTERY OF NATIVE HEART WITHOUT ANGINA PECTORIS: Primary | ICD-10-CM

## 2018-03-26 DIAGNOSIS — I10 ESSENTIAL HYPERTENSION, BENIGN: ICD-10-CM

## 2018-03-26 NOTE — TELEPHONE ENCOUNTER
Pt is requesting lab orders to be faxed to him, before the appt, April 24, [phone].  Best contact number [phone].              From answering service

## 2018-04-16 ENCOUNTER — TELEPHONE (OUTPATIENT)
Dept: INTERNAL MEDICINE CLINIC | Age: 83
End: 2018-04-16

## 2018-04-16 DIAGNOSIS — R97.20 ABNORMAL PSA: Primary | ICD-10-CM

## 2018-04-18 LAB
ALBUMIN SERPL-MCNC: 4.1 G/DL (ref 3.5–4.7)
ALBUMIN/GLOB SERPL: 1.7 {RATIO} (ref 1.2–2.2)
ALP SERPL-CCNC: 60 IU/L (ref 39–117)
ALT SERPL-CCNC: 22 IU/L (ref 0–44)
AST SERPL-CCNC: 26 IU/L (ref 0–40)
BASOPHILS # BLD AUTO: 0 X10E3/UL (ref 0–0.2)
BASOPHILS NFR BLD AUTO: 0 %
BILIRUB SERPL-MCNC: 1.6 MG/DL (ref 0–1.2)
BUN SERPL-MCNC: 22 MG/DL (ref 8–27)
BUN/CREAT SERPL: 26 (ref 10–24)
CALCIUM SERPL-MCNC: 9.3 MG/DL (ref 8.6–10.2)
CHLORIDE SERPL-SCNC: 104 MMOL/L (ref 96–106)
CHOLEST SERPL-MCNC: 160 MG/DL (ref 100–199)
CO2 SERPL-SCNC: 26 MMOL/L (ref 18–29)
CREAT SERPL-MCNC: 0.86 MG/DL (ref 0.76–1.27)
EOSINOPHIL # BLD AUTO: 0.3 X10E3/UL (ref 0–0.4)
EOSINOPHIL NFR BLD AUTO: 5 %
ERYTHROCYTE [DISTWIDTH] IN BLOOD BY AUTOMATED COUNT: 14.3 % (ref 12.3–15.4)
GFR SERPLBLD CREATININE-BSD FMLA CKD-EPI: 80 ML/MIN/1.73
GFR SERPLBLD CREATININE-BSD FMLA CKD-EPI: 93 ML/MIN/1.73
GLOBULIN SER CALC-MCNC: 2.4 G/DL (ref 1.5–4.5)
GLUCOSE SERPL-MCNC: 97 MG/DL (ref 65–99)
HCT VFR BLD AUTO: 39.9 % (ref 37.5–51)
HDLC SERPL-MCNC: 94 MG/DL
HGB BLD-MCNC: 13.2 G/DL (ref 13–17.7)
IMM GRANULOCYTES # BLD: 0 X10E3/UL (ref 0–0.1)
IMM GRANULOCYTES NFR BLD: 0 %
INTERPRETATION, 910389: NORMAL
LDLC SERPL CALC-MCNC: 53 MG/DL (ref 0–99)
LYMPHOCYTES # BLD AUTO: 2.1 X10E3/UL (ref 0.7–3.1)
LYMPHOCYTES NFR BLD AUTO: 38 %
MCH RBC QN AUTO: 32.8 PG (ref 26.6–33)
MCHC RBC AUTO-ENTMCNC: 33.1 G/DL (ref 31.5–35.7)
MCV RBC AUTO: 99 FL (ref 79–97)
MONOCYTES # BLD AUTO: 0.8 X10E3/UL (ref 0.1–0.9)
MONOCYTES NFR BLD AUTO: 14 %
NEUTROPHILS # BLD AUTO: 2.5 X10E3/UL (ref 1.4–7)
NEUTROPHILS NFR BLD AUTO: 43 %
PLATELET # BLD AUTO: 161 X10E3/UL (ref 150–379)
POTASSIUM SERPL-SCNC: 4.9 MMOL/L (ref 3.5–5.2)
PROT SERPL-MCNC: 6.5 G/DL (ref 6–8.5)
RBC # BLD AUTO: 4.02 X10E6/UL (ref 4.14–5.8)
SODIUM SERPL-SCNC: 145 MMOL/L (ref 134–144)
TRIGL SERPL-MCNC: 65 MG/DL (ref 0–149)
VLDLC SERPL CALC-MCNC: 13 MG/DL (ref 5–40)
WBC # BLD AUTO: 5.7 X10E3/UL (ref 3.4–10.8)

## 2018-04-24 ENCOUNTER — OFFICE VISIT (OUTPATIENT)
Dept: INTERNAL MEDICINE CLINIC | Age: 83
End: 2018-04-24

## 2018-04-24 VITALS
BODY MASS INDEX: 25.03 KG/M2 | HEIGHT: 69 IN | WEIGHT: 169 LBS | HEART RATE: 54 BPM | DIASTOLIC BLOOD PRESSURE: 80 MMHG | RESPIRATION RATE: 16 BRPM | SYSTOLIC BLOOD PRESSURE: 152 MMHG | OXYGEN SATURATION: 98 %

## 2018-04-24 DIAGNOSIS — R97.20 ABNORMAL PSA: Primary | ICD-10-CM

## 2018-04-24 DIAGNOSIS — Z13.39 SCREENING FOR ALCOHOLISM: ICD-10-CM

## 2018-04-24 DIAGNOSIS — I10 ESSENTIAL HYPERTENSION, BENIGN: ICD-10-CM

## 2018-04-24 DIAGNOSIS — Z00.00 MEDICARE ANNUAL WELLNESS VISIT, SUBSEQUENT: ICD-10-CM

## 2018-04-24 DIAGNOSIS — K86.2 PANCREATIC CYST: ICD-10-CM

## 2018-04-24 DIAGNOSIS — N40.0 BPH WITH ELEVATED PSA: ICD-10-CM

## 2018-04-24 DIAGNOSIS — R97.20 BPH WITH ELEVATED PSA: ICD-10-CM

## 2018-04-24 DIAGNOSIS — I25.10 CORONARY ARTERY DISEASE INVOLVING NATIVE CORONARY ARTERY OF NATIVE HEART WITHOUT ANGINA PECTORIS: ICD-10-CM

## 2018-04-24 RX ORDER — LOSARTAN POTASSIUM 100 MG/1
TABLET ORAL
Qty: 90 TAB | Refills: 3
Start: 2018-04-24 | End: 2019-05-06 | Stop reason: ALTCHOICE

## 2018-04-24 RX ORDER — AMLODIPINE BESYLATE 5 MG/1
7.5 TABLET ORAL DAILY
Qty: 135 TAB | Refills: 1 | Status: SHIPPED | OUTPATIENT
Start: 2018-04-24 | End: 2018-09-28 | Stop reason: SDUPTHER

## 2018-04-24 RX ORDER — MELOXICAM 15 MG/1
TABLET ORAL
COMMUNITY
Start: 2018-04-12 | End: 2018-11-12 | Stop reason: ALTCHOICE

## 2018-04-24 NOTE — MR AVS SNAPSHOT
[address] [place] [address] [address] [facility] 57 
[phone] Patient: Galileo Travis MRN:  [id number] Visit Information Date & Time Provider Department Dept. Phone Encounter #  
 2018  4:30 PM Merlin Nicholassolange, [address] [place] [address] [facility] [phone] [id number] Upcoming Health Maintenance Date Due  
 GLAUCOMA SCREENING Q2Y 2015 MEDICARE YEARLY EXAM 2018 DTaP/Tdap/Td series (2 - Td) 2018 Allergies as of 2018  Review Complete On: 2018 By: Flynn Sigala LPN Severity Noted Reaction Type Reactions Zantac [Ranitidine Hcl]  2010    Rash Current Immunizations  Reviewed on 2017 Name Date Influenza High Dose Vaccine PF 2017, 10/27/2016, 2015 Influenza Vaccine 10/14/2013 Pneumococcal Conjugate (PCV-13) 2015 Pneumococcal Polysaccharide (PPSV-23) 2014 TDAP Vaccine 2008 ZZZ-RETIRED (DO NOT USE) Pneumococcal Vaccine (Unspecified Type) 2008 Zoster 2007 Not reviewed this visit You Were Diagnosed With   
  
 Codes Comments Abnormal PSA    -  Primary ICD-10-CM: R97.20 ICD-9-CM: 795.89 Essential hypertension, benign     ICD-10-CM: I10 
ICD-9-CM: 401.1 Coronary artery disease involving native coronary artery of native heart without angina pectoris     ICD-10-CM: I25.10 ICD-9-CM: 414.01   
 BPH with elevated PSA     ICD-10-CM: N40.0, R97.20 ICD-9-CM: 600.00, 790.93 Pancreatic cyst     ICD-10-CM: K86.2 ICD-9-CM: 159.8 Vitals BP Pulse Resp Height(growth percentile) Weight(growth percentile) SpO2  
 152/80 (!) 54 16 5' 9\" (1.753 m) 169 lb (76.7 kg) 98% BMI Smoking Status 24.96 kg/m2 Former Smoker Vitals History BMI and BSA Data Body Mass Index Body Surface Area 24.96 kg/m 2 1.93 m 2 Preferred Pharmacy Pharmacy Name Phone [address] [place] [address] [phone] Your Updated Medication List  
  
   
This list is accurate as of 4/24/18  5:21 PM.  Always use your most recent med list. amLODIPine 5 mg tablet Commonly known as:  Natalie Raddle Take 1.5 Tabs by mouth daily. aspirin 81 mg chewable tablet Take 81 mg by mouth daily. atorvastatin 20 mg tablet Commonly known as:  LIPITOR  
TAKE 1 TABLET EVERY DAY  
  
 diclofenac 1 % Gel Commonly known as:  VOLTAREN Apply 2 g to affected area four (4) times daily. finasteride 5 mg tablet Commonly known as:  PROSCAR Take 1 Tab by mouth daily. fluticasone 50 mcg/actuation nasal spray Commonly known as:  Vinay Calk 2 Sprays by Both Nostrils route daily. FLUZONE HIGH-DOSE 2017-18 (PF) Syrg injection Generic drug:  influenza vaccine 2017-18 (65 yrs+)(PF)  
ADM 0.5ML IM UTD  
  
 losartan 100 mg tablet Commonly known as:  COZAAR  
TAKE 1 TABLET EVERY DAY  
  
 meloxicam 15 mg tablet Commonly known as:  MOBIC  
  
 omeprazole 40 mg capsule Commonly known as:  PRILOSEC Take 1 Cap by mouth daily. sildenafil citrate 100 mg tablet Commonly known as:  VIAGRA Take 1 Tab by mouth as needed. tamsulosin 0.4 mg capsule Commonly known as:  FLOMAX Take 1 Cap by mouth daily. Prescriptions Sent to Pharmacy Refills  
 amLODIPine (NORVASC) 5 mg tablet 1 Sig: Take 1.5 Tabs by mouth daily. Class: Normal  
 Pharmacy: [address] [place] [address] Ph #: [phone] Route: Oral  
  
We Performed the Following C REACTIVE PROTEIN, QT [53860 CPT(R)] PSA W/ REFLX FREE PSA [33881 CPT(R)] REFERRAL TO UROLOGY [ETD704 Custom] To-Do List   
 04/24/2018 Imaging:  US ABD COMP Referral Information Referral ID Referred By Referred To  
  
 [id number] [facility] Gertrudis Kim [address]   
   [place], [address] Visits Status Start Date End Date 1 New Request 4/24/18 4/24/19 If your referral has a status of pending review or denied, additional information will be sent to support the outcome of this decision. Introducing [facility] & HEALTH SERVICES! Dear Kwame Calix: Thank you for requesting a Meal Mantra account. Our records indicate that you already have an active Meal Mantra account. You can access your account anytime at [email or web address] Insurance Business Applications/CyberIQ Services Did you know that you can access your hospital and ER discharge instructions at any time in Meal Mantra? You can also review all of your test results from your hospital stay or ER visit. Additional Information If you have questions, please visit the Frequently Asked Questions section of the Meal Mantra website at [email or web address] Remember, Meal Mantra is NOT to be used for urgent needs. For medical emergencies, dial 911. Now available from your iPhone and Android! Please provide this summary of care documentation to your next provider. Your primary care clinician is listed as Doreatha Galeazzi. If you have any questions after today's visit, please call [phone].

## 2018-04-24 NOTE — PROGRESS NOTES
Subjective:  He wanted to come today to fill me in on his status, ask me about his, blood pressure, get some refills. He's continued to have trouble with his left knee. He saw Christiano Phillips, who did an injection with a cartilage product and steroid. When in [place] this winter he saw a physician down in [facility], who reinjected him, but put him on the five injection cartilage injection, put him on an  brace and gave him Meloxicam.  His knee is a little better. He's been home for three weeks . His prostate issue is much worse. He has enlarged prostate with elevated PSA. He's been seeing Dr. Tricia Rudolph, but he's never had any procedures done or surgery done. He wants another opinion. He's now at the point where he's incontinent at times and he's miserable. He wanted follow up on pancreatic cyst seen on ultrasound a few years ago and has noted his blood pressure is running higher, especially since taking Meloxicam.  His blood pressure has run 150-160. He on his own increased Losartan to 100, but his blood pressure continues to run high. Physical Examination:  His blood pressure in the office today was about 155/80. He has a little bit of edema in the ankles. S1, S2 regular. Weight stable. Nutrition good. Little bit of muscle weakness. He walks with a limp. He has an  brace on the left knee. Plan:  1. I'd recommending increasing Amlodipine to 7.5 daily. 2. Consider adding diuretic. 3. I'd recommend to continue to watch for renal insufficiency, although creatinine is normal.  4. He wants a PSA and he's going to get a second opinion. I recommended Yanelis Preston to see if he can get any relief from these prostate symptoms. 5. I told him to follow up with orthopedics when he goes back to [place] next winter as that is his plan. 6. His other labs were reviewed.   Lipids were normal, renal function normal, liver enzymes were normal.  7. He continues to overuse alcohol, 2-2 1/2 drinks per night. SUBJECTIVE: Galileo Travis is a 80 y.o. male seen for a follow up visit; he has hypertension, hyperlipidemia, coronary artery disease and no symptoms. Current Outpatient Prescriptions   Medication Sig Dispense Refill    meloxicam (MOBIC) 15 mg tablet       losartan (COZAAR) 100 mg tablet TAKE 1 TABLET EVERY DAY 90 Tab 3    amLODIPine (NORVASC) 5 mg tablet Take 1.5 Tabs by mouth daily. 135 Tab 1    finasteride (PROSCAR) 5 mg tablet Take 1 Tab by mouth daily. 90 Tab 1    fluticasone (FLONASE) 50 mcg/actuation nasal spray 2 Sprays by Both Nostrils route daily. 3 Bottle 1    atorvastatin (LIPITOR) 20 mg tablet TAKE 1 TABLET EVERY DAY 90 Tab 3    diclofenac (VOLTAREN) 1 % gel Apply 2 g to affected area four (4) times daily. 1 Each 2    aspirin 81 mg chewable tablet Take 81 mg by mouth daily.  FLUZONE HIGH-DOSE 2017-18, PF, syrg injection ADM 0.5ML IM UTD  0    tamsulosin (FLOMAX) 0.4 mg capsule Take 1 Cap by mouth daily. 90 Cap 1    sildenafil citrate (VIAGRA) 100 mg tablet Take 1 Tab by mouth as needed. 10 Tab 3    omeprazole (PRILOSEC) 40 mg capsule Take 1 Cap by mouth daily.  90 Cap 1     Patient Active Problem List   Diagnosis Code    Essential hypertension, benign I10    CAD (coronary artery disease) I25.10    Rhinitis J31.0    ED (erectile dysfunction) N52.9    Abnormal PSA R97.20    HLD (hyperlipidemia) E78.5    BPH (benign prostatic hyperplasia) N40.0    GERD (gastroesophageal reflux disease) K21.9    Sarcopenia M62.84    Pancreatic cyst K86.2    Carotid artery disease ([facility] [address] I77.9    Coronary artery disease involving native coronary artery without angina pectoris I25.10     System Review: Cardiovascular ROS - taking medications as instructed, no medication side effects noted, no TIA's, no chest pain on exertion, no dyspnea on exertion, no swelling of ankles, CNS ROS - no TIA or stroke-like symptoms, no amaurosis, diplopia, abnormal speech, unilateral numbness or weakness. New concerns: joint pain   Left knee medial pain weeks worse with treadmill and stairs up and down 2 steroid shots in past  And then eval 2 orthopedics  OBJECTIVE:  Visit Vitals    /80    Pulse (!) 54    Resp 16    Ht 5' 9\" (1.753 m)    Wt 169 lb (76.7 kg)    SpO2 98%    BMI 24.96 kg/m2      Vitals:    04/24/18 1647 04/24/18 1708   BP: 158/76 152/80   Pulse: (!) 54    Resp: 16    SpO2: 98%    Weight: 169 lb (76.7 kg)    Height: 5' 9\" (1.753 m)        Appearance: alert, well appearing, and in no distress, oriented to person, place, and time, normal appearing weight and acyanotic, in no respiratory distress. General exam: CVS exam BP noted to be well controlled today in office, S1, S2 normal, no gallop, no murmur, chest clear, no JVD, no HSM, no edema, peripheral vascular exam both carotids normal upstroke without bruits, radial pulses normal, femoral artery pulses normal, varicose veins noted, venous stasis dermatitis without ulcerations, pedal pulses normal both DP's and PT's. Lab review: labs reviewed, I note that lipids LDL result meets goal, comprehensive metabolic panel normal,   Knee exam: the painful knee reveals antalgic gait, soft tissue tenderness over medial, reduced range of motion.  X-ray is pending  Had 2 injection xray mild  Narrowing medially    Lab Results   Component Value Date/Time    Cholesterol, total 160 04/17/2018 08:17 AM    HDL Cholesterol 94 04/17/2018 08:17 AM    LDL, calculated 53 04/17/2018 08:17 AM    VLDL, calculated 13 04/17/2018 08:17 AM    Triglyceride 65 04/17/2018 08:17 AM    CHOL/HDL Ratio 1.4 06/30/2010 08:37 AM     Lab Results   Component Value Date/Time    GFR est AA 93 04/17/2018 08:17 AM    GFR est non-AA 80 04/17/2018 08:17 AM    Creatinine 0.86 04/17/2018 08:17 AM    BUN 22 04/17/2018 08:17 AM    Sodium 145 (H) 04/17/2018 08:17 AM    Potassium 4.9 04/17/2018 08:17 AM    Chloride 104 04/17/2018 08:17 AM    CO2 26 04/17/2018 08:17 AM     Lab Results Component Value Date/Time    WBC 5.7 04/17/2018 08:17 AM    WBC 5.4 05/15/2012 12:00 AM    HGB 13.2 04/17/2018 08:17 AM    HCT 39.9 04/17/2018 08:17 AM    PLATELET 714 84/23/7628 08:17 AM    MCV 99 (H) 04/17/2018 08:17 AM     Lab Results   Component Value Date/Time    Prostate Specific Ag 11.2 (H) 10/12/2017 08:26 AM    Prostate Specific Ag 11.6 (H) 04/04/2017 08:34 AM    Prostate Specific Ag 10.2 (H) 02/01/2012 12:00 AM    Prostate Specific Ag 3.9 06/30/2010 08:37 AM    Prostate Specific Ag 4.0 (H) 11/02/2009 08:56 AM         ASSESSMENT:  hypertension well controlled, stable, hyperlipidemia well controlled, stable. PLAN:            Hypertension controlled for age based on guidelines  1. Arthritis of knee, left  See ortho  And discussed      2. Coronary artery disease involving native coronary artery of native heart without angina pectoris  stable    3. Pure hypercholesterolemia  controlled    4. Abnormal PSA  Follow urology may need turp for symptoms  Daily cialis  A thought  Second opinion advised uroogy  5.  Essential hypertension, benign  Controlled  Fair worseneed by the nsaid  So increase to 7.5 amlodipine no diuretic as bladder issue severe now    Lab Results   Component Value Date/Time    GFR est AA 93 04/17/2018 08:17 AM    GFR est non-AA 80 04/17/2018 08:17 AM    Creatinine 0.86 04/17/2018 08:17 AM    BUN 22 04/17/2018 08:17 AM    Sodium 145 (H) 04/17/2018 08:17 AM    Potassium 4.9 04/17/2018 08:17 AM    Chloride 104 04/17/2018 08:17 AM    CO2 26 04/17/2018 08:17 AM     Lab Results   Component Value Date/Time    Cholesterol, total 160 04/17/2018 08:17 AM    HDL Cholesterol 94 04/17/2018 08:17 AM    LDL, calculated 53 04/17/2018 08:17 AM    VLDL, calculated 13 04/17/2018 08:17 AM    Triglyceride 65 04/17/2018 08:17 AM    CHOL/HDL Ratio 1.4 06/30/2010 08:37 AM           This is the Subsequent Medicare Annual Wellness Exam, performed 12 months or more after the Initial AWV or the last Subsequent AWV    I have reviewed the patient's medical history in detail and updated the computerized patient record. Pancreatic cyst in past no weight loss or absd pain needs to re eval for completeness  History     Past Medical History:   Diagnosis Date    Abnormal PSA 5/9/2011    BPH (benign prostatic hyperplasia)     CAD (coronary artery disease)     Carotid artery disease ([facility] [address] 4/14/2014    ED (erectile dysfunction)     GERD (gastroesophageal reflux disease) 5/21/2012    Hypercholesterolemia     Hypertension     Pancreatic cyst 10/21/2013    Rhinitis 8/23/2010    Sarcopenia 7/15/2013      Past Surgical History:   Procedure Laterality Date    ENDOSCOPY, COLON, DIAGNOSTIC  2012    HX ORTHOPAEDIC  1951    cervical fracture     Current Outpatient Prescriptions   Medication Sig Dispense Refill    meloxicam (MOBIC) 15 mg tablet       losartan (COZAAR) 100 mg tablet TAKE 1 TABLET EVERY DAY 90 Tab 3    amLODIPine (NORVASC) 5 mg tablet Take 1.5 Tabs by mouth daily. 135 Tab 1    finasteride (PROSCAR) 5 mg tablet Take 1 Tab by mouth daily. 90 Tab 1    fluticasone (FLONASE) 50 mcg/actuation nasal spray 2 Sprays by Both Nostrils route daily. 3 Bottle 1    atorvastatin (LIPITOR) 20 mg tablet TAKE 1 TABLET EVERY DAY 90 Tab 3    diclofenac (VOLTAREN) 1 % gel Apply 2 g to affected area four (4) times daily. 1 Each 2    aspirin 81 mg chewable tablet Take 81 mg by mouth daily.  FLUZONE HIGH-DOSE 2017-18, PF, syrg injection ADM 0.5ML IM UTD  0    tamsulosin (FLOMAX) 0.4 mg capsule Take 1 Cap by mouth daily. 90 Cap 1    sildenafil citrate (VIAGRA) 100 mg tablet Take 1 Tab by mouth as needed. 10 Tab 3    omeprazole (PRILOSEC) 40 mg capsule Take 1 Cap by mouth daily. 90 Cap 1     Allergies   Allergen Reactions    Zantac [Ranitidine Hcl] Rash     No family history on file.   Social History   Substance Use Topics    Smoking status: Former Smoker     Quit date: 8/23/1965    Smokeless tobacco: Never Used    Alcohol use 7.0 oz/week     14 Shots of liquor per week     Patient Active Problem List   Diagnosis Code    Essential hypertension, benign I10    CAD (coronary artery disease) I25.10    Rhinitis J31.0    ED (erectile dysfunction) N52.9    Abnormal PSA R97.20    HLD (hyperlipidemia) E78.5    BPH (benign prostatic hyperplasia) N40.0    GERD (gastroesophageal reflux disease) K21.9    Sarcopenia M62.84    Pancreatic cyst K86.2    Carotid artery disease ([facility] [address] I77.9    Coronary artery disease involving native coronary artery without angina pectoris I25.10       Depression Risk Factor Screening:     PHQ over the last two weeks 10/17/2017   Little interest or pleasure in doing things Not at all   Feeling down, depressed or hopeless Not at all   Total Score PHQ 2 0     Alcohol Risk Factor Screening:   > 2 per day    Functional Ability and Level of Safety:   Hearing Loss  Hearing is good. Activities of Daily Living  The home contains: no safety equipment. Patient does total self care    Fall Risk  Fall Risk Assessment, last 12 mths 10/17/2017   Able to walk? Yes   Fall in past 12 months? No       Abuse Screen  Patient is not abused    Cognitive Screening   Evaluation of Cognitive Function:  Has your family/caregiver stated any concerns about your memory: no  Normal    Patient Care Team   Patient Care Team:  Alicia Fitch MD as PCP - General    Assessment/Plan   Education and counseling provided:  Are appropriate based on today's review and evaluation  End-of-Life planning (with patient's consent)  Prostate cancer screening tests (PSA, covered annually)    Diagnoses and all orders for this visit:    1. Abnormal PSA  -     PSA W/ REFLX FREE PSA    2. Essential hypertension, benign  -     C REACTIVE PROTEIN, QT  -     losartan (COZAAR) 100 mg tablet; TAKE 1 TABLET EVERY DAY  -     amLODIPine (NORVASC) 5 mg tablet; Take 1.5 Tabs by mouth daily.     3. Coronary artery disease involving native coronary artery of native heart without angina pectoris    4. BPH with elevated PSA  -     PSA W/ REFLX FREE PSA  -     C REACTIVE PROTEIN, QT  -     REFERRAL TO UROLOGY    5. Pancreatic cyst  -     US ABD COMP; Future    6. Medicare annual wellness visit, subsequent    7.  Screening for alcoholism  -     Annual  Alcohol Screen 15 min ()        Health Maintenance Due   Topic Date Due    GLAUCOMA SCREENING Q2Y  12/11/2015    MEDICARE YEARLY EXAM  04/14/2018

## 2018-04-25 NOTE — PATIENT INSTRUCTIONS

## 2018-04-26 LAB
CRP SERPL-MCNC: 0.3 MG/L (ref 0–4.9)
PSA SERPL-MCNC: 15.3 NG/ML (ref 0–4)
REFLEX CRITERIA: ABNORMAL

## 2018-04-27 ENCOUNTER — TELEPHONE (OUTPATIENT)
Dept: INTERNAL MEDICINE CLINIC | Age: 83
End: 2018-04-27

## 2018-04-27 NOTE — TELEPHONE ENCOUNTER
Notified the patient per Dr Canela Scale My Chart Msg.  Certainly PSA is rising so more evaluation is a good idea as discussed during the visit. He states that he will see Dr Mary Walker after his ultrasound. His current PSA is 15.3.

## 2018-05-02 ENCOUNTER — TELEPHONE (OUTPATIENT)
Dept: INTERNAL MEDICINE CLINIC | Age: 83
End: 2018-05-02

## 2018-05-02 ENCOUNTER — HOSPITAL ENCOUNTER (OUTPATIENT)
Dept: ULTRASOUND IMAGING | Age: 83
Discharge: HOME | End: 2018-05-02
Attending: INTERNAL MEDICINE
Payer: MEDICARE

## 2018-05-02 DIAGNOSIS — K86.2 PANCREATIC CYST: ICD-10-CM

## 2018-05-02 PROCEDURE — 76700 US EXAM ABDOM COMPLETE: CPT

## 2018-05-02 NOTE — TELEPHONE ENCOUNTER
Robert Molina called from [facility] called  Stated need to get additional information  Patient wasn't prep for \"Bladder\" on set for Davon contact  [phone]        From [facility]

## 2018-06-18 ENCOUNTER — TELEPHONE (OUTPATIENT)
Dept: INTERNAL MEDICINE CLINIC | Age: 83
End: 2018-06-18

## 2018-06-18 RX ORDER — AZITHROMYCIN 250 MG/1
250 TABLET, FILM COATED ORAL SEE ADMIN INSTRUCTIONS
Qty: 6 TAB | Refills: 0 | Status: SHIPPED | OUTPATIENT
Start: 2018-06-18 | End: 2018-06-23

## 2018-06-18 NOTE — TELEPHONE ENCOUNTER
Left message for patient to call back to advised per Dr Radha Posadas that medication has been sent to [facility] .

## 2018-06-18 NOTE — TELEPHONE ENCOUNTER
Phone call was taken by nurse Miranda Otero: Patient requesting call back, patient states he has green mucus and pressure in the sinus area and doesn't want this to go into the chest. Patient was offered an appoinment and states he doesn't want to come in but is requesting a call back from Dr. Vern Linn.

## 2018-06-18 NOTE — TELEPHONE ENCOUNTER
Pt is calling he states he has a sinus infection, his symptoms include mucus and stuffy nose, pt wants to know if he can have a Z Pack called into his [facility] [phone], pt can be reached at [phone]

## 2018-06-21 RX ORDER — ATORVASTATIN CALCIUM 20 MG/1
TABLET, FILM COATED ORAL
Qty: 90 TAB | Refills: 3 | Status: SHIPPED | OUTPATIENT
Start: 2018-06-21 | End: 2019-04-19 | Stop reason: SDUPTHER

## 2018-07-31 ENCOUNTER — HOSPITAL ENCOUNTER (OUTPATIENT)
Dept: CT IMAGING | Age: 83
Discharge: HOME | End: 2018-07-31
Attending: UROLOGY
Payer: MEDICARE

## 2018-07-31 ENCOUNTER — HOSPITAL ENCOUNTER (OUTPATIENT)
Dept: NUCLEAR MEDICINE | Age: 83
Discharge: HOME | End: 2018-07-31
Attending: UROLOGY
Payer: MEDICARE

## 2018-07-31 DIAGNOSIS — C61 MALIGNANT NEOPLASM OF PROSTATE (HCC): ICD-10-CM

## 2018-07-31 LAB — CREAT BLD-MCNC: 0.8 MG/DL (ref 0.6–1.3)

## 2018-07-31 PROCEDURE — 74177 CT ABD & PELVIS W/CONTRAST: CPT

## 2018-07-31 PROCEDURE — 74011636320 HC RX REV CODE- 636/320: Performed by: UROLOGY

## 2018-07-31 PROCEDURE — 82565 ASSAY OF CREATININE: CPT

## 2018-07-31 PROCEDURE — 78306 BONE IMAGING WHOLE BODY: CPT

## 2018-07-31 PROCEDURE — 74011000258 HC RX REV CODE- 258: Performed by: UROLOGY

## 2018-07-31 RX ORDER — SODIUM CHLORIDE 0.9 % (FLUSH) 0.9 %
10 SYRINGE (ML) INJECTION
Status: COMPLETED | OUTPATIENT
Start: 2018-07-31 | End: 2018-07-31

## 2018-07-31 RX ADMIN — IOPAMIDOL 100 ML: 755 INJECTION, SOLUTION INTRAVENOUS at 13:03

## 2018-07-31 RX ADMIN — IOHEXOL 50 ML: 240 INJECTION, SOLUTION INTRATHECAL; INTRAVASCULAR; INTRAVENOUS; ORAL at 13:03

## 2018-07-31 RX ADMIN — SODIUM CHLORIDE 100 ML: 900 INJECTION, SOLUTION INTRAVENOUS at 13:03

## 2018-07-31 RX ADMIN — Medication 10 ML: at 13:03

## 2018-09-28 DIAGNOSIS — I10 ESSENTIAL HYPERTENSION, BENIGN: ICD-10-CM

## 2018-09-29 RX ORDER — FLUTICASONE PROPIONATE 50 MCG
SPRAY, SUSPENSION (ML) NASAL
Qty: 48 G | Refills: 1 | Status: SHIPPED | OUTPATIENT
Start: 2018-09-29 | End: 2020-04-16 | Stop reason: SDUPTHER

## 2018-09-29 RX ORDER — FINASTERIDE 5 MG/1
TABLET, FILM COATED ORAL
Qty: 90 TAB | Refills: 1 | Status: SHIPPED | OUTPATIENT
Start: 2018-09-29 | End: 2019-04-11 | Stop reason: SDUPTHER

## 2018-09-29 RX ORDER — AMLODIPINE BESYLATE 5 MG/1
TABLET ORAL
Qty: 135 TAB | Refills: 1 | Status: SHIPPED | OUTPATIENT
Start: 2018-09-29 | End: 2018-11-12

## 2018-10-03 ENCOUNTER — HOSPITAL ENCOUNTER (OUTPATIENT)
Dept: RADIATION THERAPY | Age: 83
Discharge: HOME | End: 2018-10-03

## 2018-10-23 DIAGNOSIS — I25.10 CORONARY ARTERY DISEASE INVOLVING NATIVE CORONARY ARTERY OF NATIVE HEART WITHOUT ANGINA PECTORIS: ICD-10-CM

## 2018-10-23 DIAGNOSIS — I10 ESSENTIAL HYPERTENSION, BENIGN: Primary | ICD-10-CM

## 2018-10-30 LAB
ALBUMIN SERPL-MCNC: 4.2 G/DL (ref 3.5–4.7)
ALBUMIN/GLOB SERPL: 1.6 {RATIO} (ref 1.2–2.2)
ALP SERPL-CCNC: 56 IU/L (ref 39–117)
ALT SERPL-CCNC: 22 IU/L (ref 0–44)
AST SERPL-CCNC: 28 IU/L (ref 0–40)
BASOPHILS # BLD AUTO: 0 X10E3/UL (ref 0–0.2)
BASOPHILS NFR BLD AUTO: 0 %
BILIRUB SERPL-MCNC: 1.3 MG/DL (ref 0–1.2)
BUN SERPL-MCNC: 16 MG/DL (ref 8–27)
BUN/CREAT SERPL: 23 (ref 10–24)
CALCIUM SERPL-MCNC: 9.3 MG/DL (ref 8.6–10.2)
CHLORIDE SERPL-SCNC: 107 MMOL/L (ref 96–106)
CHOLEST SERPL-MCNC: 181 MG/DL (ref 100–199)
CO2 SERPL-SCNC: 23 MMOL/L (ref 20–29)
CREAT SERPL-MCNC: 0.71 MG/DL (ref 0.76–1.27)
EOSINOPHIL # BLD AUTO: 0.3 X10E3/UL (ref 0–0.4)
EOSINOPHIL NFR BLD AUTO: 6 %
ERYTHROCYTE [DISTWIDTH] IN BLOOD BY AUTOMATED COUNT: 13.6 % (ref 12.3–15.4)
GLOBULIN SER CALC-MCNC: 2.7 G/DL (ref 1.5–4.5)
GLUCOSE SERPL-MCNC: 109 MG/DL (ref 65–99)
HCT VFR BLD AUTO: 39.6 % (ref 37.5–51)
HDLC SERPL-MCNC: 116 MG/DL
HGB BLD-MCNC: 13 G/DL (ref 13–17.7)
IMM GRANULOCYTES # BLD: 0 X10E3/UL (ref 0–0.1)
IMM GRANULOCYTES NFR BLD: 0 %
INTERPRETATION, 910389: NORMAL
LDLC SERPL CALC-MCNC: 52 MG/DL (ref 0–99)
LYMPHOCYTES # BLD AUTO: 1.9 X10E3/UL (ref 0.7–3.1)
LYMPHOCYTES NFR BLD AUTO: 37 %
MCH RBC QN AUTO: 33 PG (ref 26.6–33)
MCHC RBC AUTO-ENTMCNC: 32.8 G/DL (ref 31.5–35.7)
MCV RBC AUTO: 101 FL (ref 79–97)
MONOCYTES # BLD AUTO: 0.6 X10E3/UL (ref 0.1–0.9)
MONOCYTES NFR BLD AUTO: 11 %
NEUTROPHILS # BLD AUTO: 2.4 X10E3/UL (ref 1.4–7)
NEUTROPHILS NFR BLD AUTO: 46 %
PLATELET # BLD AUTO: 170 X10E3/UL (ref 150–379)
POTASSIUM SERPL-SCNC: 4.5 MMOL/L (ref 3.5–5.2)
PROT SERPL-MCNC: 6.9 G/DL (ref 6–8.5)
RBC # BLD AUTO: 3.94 X10E6/UL (ref 4.14–5.8)
SODIUM SERPL-SCNC: 143 MMOL/L (ref 134–144)
TRIGL SERPL-MCNC: 65 MG/DL (ref 0–149)
VLDLC SERPL CALC-MCNC: 13 MG/DL (ref 5–40)
WBC # BLD AUTO: 5.1 X10E3/UL (ref 3.4–10.8)

## 2018-11-12 ENCOUNTER — OFFICE VISIT (OUTPATIENT)
Dept: INTERNAL MEDICINE CLINIC | Age: 83
End: 2018-11-12

## 2018-11-12 VITALS
HEIGHT: 69 IN | OXYGEN SATURATION: 98 % | BODY MASS INDEX: 25.48 KG/M2 | RESPIRATION RATE: 18 BRPM | WEIGHT: 172 LBS | TEMPERATURE: 96.8 F | HEART RATE: 68 BPM | SYSTOLIC BLOOD PRESSURE: 174 MMHG | DIASTOLIC BLOOD PRESSURE: 81 MMHG

## 2018-11-12 DIAGNOSIS — E78.00 PURE HYPERCHOLESTEROLEMIA: ICD-10-CM

## 2018-11-12 DIAGNOSIS — E74.39 GLUCOSE INTOLERANCE: ICD-10-CM

## 2018-11-12 DIAGNOSIS — R91.1 NODULE OF LEFT LUNG: ICD-10-CM

## 2018-11-12 DIAGNOSIS — I10 ESSENTIAL HYPERTENSION, BENIGN: ICD-10-CM

## 2018-11-12 DIAGNOSIS — I25.10 CORONARY ARTERY DISEASE INVOLVING NATIVE CORONARY ARTERY OF NATIVE HEART WITHOUT ANGINA PECTORIS: ICD-10-CM

## 2018-11-12 DIAGNOSIS — C61 PROSTATE CANCER (HCC): Primary | ICD-10-CM

## 2018-11-12 RX ORDER — AMLODIPINE BESYLATE 10 MG/1
10 TABLET ORAL DAILY
Qty: 90 TAB | Refills: 1 | Status: SHIPPED | OUTPATIENT
Start: 2018-11-12 | End: 2019-04-11 | Stop reason: SDUPTHER

## 2018-11-12 NOTE — PATIENT INSTRUCTIONS
Medicare Wellness Visit, Male The best way to live healthy is to have a lifestyle where you eat a well-balanced diet, exercise regularly, limit alcohol use, and quit all forms of tobacco/nicotine, if applicable. Regular preventive services are another way to keep healthy. Preventive services (vaccines, screening tests, monitoring & exams) can help personalize your care plan, which helps you manage your own care. Screening tests can find health problems at the earliest stages, when they are easiest to treat. [address] [facility] follows the current, evidence-based guidelines published by the [facility] Jono Serjio ([facility]) when recommending preventive services for our patients. Because we follow these guidelines, sometimes recommendations change over time as research supports it. (For example, a prostate screening blood test is no longer routinely recommended for men with no symptoms.) Of course, you and your doctor may decide to screen more often for some diseases, based on your risk and co-morbidities (chronic disease you are already diagnosed with). Preventive services for you include: - Medicare offers their members a free annual wellness visit, which is time for you and your primary care provider to discuss and plan for your preventive service needs. Take advantage of this benefit every year! 
-All adults over age 72 should receive the recommended pneumonia vaccines. Current USPSTF guidelines recommend a series of two vaccines for the best pneumonia protection.  
-All adults should have a flu vaccine yearly and an ECG.  All adults age 61 and older should receive a shingles vaccine once in their lifetime.   
-All adults age 38-68 who are overweight should have a diabetes screening test once every three years.  
-Other screening tests & preventive services for persons with diabetes include: an eye exam to screen for diabetic retinopathy, a kidney function test, a foot exam, and stricter control over your cholesterol.  
-Cardiovascular screening for adults with routine risk involves an electrocardiogram (ECG) at intervals determined by the provider.  
-Colorectal cancer screening should be done for adults age 54-65 with no increased risk factors for colorectal cancer. There are a number of acceptable methods of screening for this type of cancer. Each test has its own benefits and drawbacks. Discuss with your provider what is most appropriate for you during your annual wellness visit. The different tests include: colonoscopy (considered the best screening method), a fecal occult blood test, a fecal DNA test, and sigmoidoscopy. 
-All adults born between [facility] should be screened once for Hepatitis C. 
-An Abdominal Aortic Aneurysm (AAA) Screening is recommended for men age 73-68 who has ever smoked in their lifetime. Here is a list of your current Health Maintenance items (your personalized list of preventive services) with a due date: 
Health Maintenance Due Topic Date Due  Shingles Vaccine (1 of 2) 09/01/1984  Glaucoma Screening   12/11/2015  DTaP/Tdap/Td  (2 - Td) 08/23/2018

## 2018-11-12 NOTE — PROGRESS NOTES
Chief Complaint Patient presents with  Coronary Artery Disease  GERD  Erectile Dysfunction  Benign Prostatic Hypertrophy  Cholesterol Problem Subjective:  Seen six months ago and referred to [facility] for abnormal prostate exam and abnormal PSA that was rising. He has been diagnosed with prostate cancer. It is felt that the tumor may have gotten outside of the prostate capsule, so did not feel he was a candidate for surgery. He has been referred for radiation seed type implant along with Lupron. He has already had two shots of Lupron, a short acting and then a six month Lupron injection. Has a follow up visit with [facility] later this week. He is hoping to do his radiation in [place], where he lives for the winter. He has been seen by radiation therapy here and has been seen by radiation therapy in [place], [place]. They are requesting a referral for a primary care doctor in [place]. His blood pressure control has not been great. Sometimes after exercise improves, but at rest his systolics continue elevated. He takes between 5 and 7.5 of Amlodipine daily. His other meds are as noted. Physical Examination:  His blood pressure was elevated today, S1, S2 was regular. Soft abdomen. neuro normal 
Wt Readings from Last 3 Encounters:  
11/12/18 172 lb (78 kg) 04/24/18 169 lb (76.7 kg) 10/17/17 167 lb (75.8 kg) Plan:  I answered all questions. I put in a referral for radiation therapy consult in [place]. I am not sure if this will be adequate for his insurance. He has an appointment with [facility] later this week and I told him to get any questions answered there. He will increase his blood pressure meds. He will take Amlodipine 10 daily. Vitals:  
 11/12/18 1524 BP: 174/81 Pulse: 68 Resp: 18 Temp: 96.8 °F (36 °C) TempSrc: Oral  
SpO2: 98% Weight: 172 lb (78 kg) Height: 5' 9\" (1.753 m) S1 and S2 normal, no murmurs, clicks, gallops or rubs. Regular rate and rhythm. Chest is clear; no wheezes or rales. No edema or JVD. Lab Results Component Value Date/Time WBC 5.1 10/29/2018 08:48 AM  
 HGB 13.0 10/29/2018 08:48 AM  
 HCT 39.6 10/29/2018 08:48 AM  
 PLATELET 445 03/04/0310 08:48 AM  
  (H) 10/29/2018 08:48 AM  
 
Lab Results Component Value Date/Time Hemoglobin A1c 5.7 (H) 05/11/2016 08:20 AM  
 Hemoglobin A1c 5.7 (H) 12/22/2014 08:40 AM  
 Hemoglobin A1c 5.4 04/07/2014 12:00 AM  
 Glucose 109 (H) 10/29/2018 08:48 AM  
 Microalbumin/Creat ratio (mg/g creat) 7 06/30/2010 08:37 AM  
 Microalb/Creat ratio (ug/mg creat.) 1.2 04/26/2011 12:00 AM  
 Microalbumin,urine random 0.99 06/30/2010 08:37 AM  
 LDL, calculated 52 10/29/2018 08:48 AM  
 Creatinine (POC) 0.8 07/31/2018 12:53 PM  
 Creatinine 0.71 (L) 10/29/2018 08:48 AM  
  
Lab Results Component Value Date/Time Cholesterol, total 181 10/29/2018 08:48 AM  
 HDL Cholesterol 116 10/29/2018 08:48 AM  
 LDL, calculated 52 10/29/2018 08:48 AM  
 Triglyceride 65 10/29/2018 08:48 AM  
 CHOL/HDL Ratio 1.4 06/30/2010 08:37 AM  
 
Lab Results Component Value Date/Time GFR est non-AA 86 10/29/2018 08:48 AM  
 GFRNA, POC >60 07/31/2018 12:53 PM  
 GFR est  10/29/2018 08:48 AM  
 GFRAA, POC >60 07/31/2018 12:53 PM  
 Creatinine 0.71 (L) 10/29/2018 08:48 AM  
 Creatinine (POC) 0.8 07/31/2018 12:53 PM  
 BUN 16 10/29/2018 08:48 AM  
 Sodium 143 10/29/2018 08:48 AM  
 Potassium 4.5 10/29/2018 08:48 AM  
 Chloride 107 (H) 10/29/2018 08:48 AM  
 CO2 23 10/29/2018 08:48 AM  
 
Outside CT   Tiny nodule cannot measure size so small Quit smoiking 5202 1. Prostate cancer ([facility] Agree with plan 
- REFERRAL TO RADIATION ONCOLOGY 2. Essential hypertension, benign Not controlled 
- amLODIPine (NORVASC) 10 mg tablet; Take 1 Tab by mouth daily. Dispense: 90 Tab; Refill: 1 3. Nodule of left lung No evidence that is significant 4. Coronary artery disease involving native coronary artery of native heart without angina pectoris No symptoms 5. Pure hypercholesterolemia On meds 6. Glucose intolerance Higher to monitor Lab Results Component Value Date/Time Glucose 109 (H) 10/29/2018 08:48 AM  
 
 
 
Prolonged visit with 15 minutes of time out  of more than a  25 minute visit spent counseling patient and family and formulation of care

## 2018-11-12 NOTE — PROGRESS NOTES
1. Have you been to the ER, urgent care clinic since your last visit? Hospitalized since your last visit? No 
 
2. Have you seen or consulted any other health care providers outside of the [address] [place] [address] since your last visit? Include any pap smears or colon screening. [facility]

## 2019-03-07 DIAGNOSIS — I10 ESSENTIAL HYPERTENSION, BENIGN: ICD-10-CM

## 2019-03-07 RX ORDER — LOSARTAN POTASSIUM 50 MG/1
TABLET ORAL
Qty: 90 TAB | Refills: 3 | Status: SHIPPED | OUTPATIENT
Start: 2019-03-07 | End: 2019-12-02 | Stop reason: SDUPTHER

## 2019-04-11 DIAGNOSIS — I10 ESSENTIAL HYPERTENSION, BENIGN: ICD-10-CM

## 2019-04-11 RX ORDER — AMLODIPINE BESYLATE 10 MG/1
TABLET ORAL
Qty: 90 TAB | Refills: 1 | Status: SHIPPED | OUTPATIENT
Start: 2019-04-11 | End: 2019-12-02 | Stop reason: SDUPTHER

## 2019-04-11 RX ORDER — FINASTERIDE 5 MG/1
TABLET, FILM COATED ORAL
Qty: 90 TAB | Refills: 1 | Status: SHIPPED | OUTPATIENT
Start: 2019-04-11 | End: 2019-12-02 | Stop reason: ALTCHOICE

## 2019-04-19 RX ORDER — ATORVASTATIN CALCIUM 20 MG/1
TABLET, FILM COATED ORAL
Qty: 90 TAB | Refills: 3 | Status: SHIPPED | OUTPATIENT
Start: 2019-04-19 | End: 2019-12-02 | Stop reason: SDUPTHER

## 2019-04-22 DIAGNOSIS — E78.00 PURE HYPERCHOLESTEROLEMIA: ICD-10-CM

## 2019-04-22 DIAGNOSIS — I10 ESSENTIAL HYPERTENSION, BENIGN: ICD-10-CM

## 2019-04-22 DIAGNOSIS — I25.10 CORONARY ARTERY DISEASE INVOLVING NATIVE CORONARY ARTERY OF NATIVE HEART WITHOUT ANGINA PECTORIS: Primary | ICD-10-CM

## 2019-05-04 LAB
ALBUMIN SERPL-MCNC: 4.1 G/DL (ref 3.5–4.7)
ALBUMIN/GLOB SERPL: 1.7 {RATIO} (ref 1.2–2.2)
ALP SERPL-CCNC: 62 IU/L (ref 39–117)
ALT SERPL-CCNC: 16 IU/L (ref 0–44)
AST SERPL-CCNC: 20 IU/L (ref 0–40)
BASOPHILS # BLD AUTO: 0 X10E3/UL (ref 0–0.2)
BASOPHILS NFR BLD AUTO: 0 %
BILIRUB SERPL-MCNC: 1 MG/DL (ref 0–1.2)
BUN SERPL-MCNC: 22 MG/DL (ref 8–27)
BUN/CREAT SERPL: 27 (ref 10–24)
CALCIUM SERPL-MCNC: 9.3 MG/DL (ref 8.6–10.2)
CHLORIDE SERPL-SCNC: 107 MMOL/L (ref 96–106)
CHOLEST SERPL-MCNC: 166 MG/DL (ref 100–199)
CO2 SERPL-SCNC: 25 MMOL/L (ref 20–29)
CREAT SERPL-MCNC: 0.83 MG/DL (ref 0.76–1.27)
EOSINOPHIL # BLD AUTO: 0.3 X10E3/UL (ref 0–0.4)
EOSINOPHIL NFR BLD AUTO: 7 %
ERYTHROCYTE [DISTWIDTH] IN BLOOD BY AUTOMATED COUNT: 14.3 % (ref 12.3–15.4)
GLOBULIN SER CALC-MCNC: 2.4 G/DL (ref 1.5–4.5)
GLUCOSE SERPL-MCNC: 100 MG/DL (ref 65–99)
HCT VFR BLD AUTO: 36.6 % (ref 37.5–51)
HDLC SERPL-MCNC: 128 MG/DL
HGB BLD-MCNC: 12.1 G/DL (ref 13–17.7)
IMM GRANULOCYTES # BLD AUTO: 0 X10E3/UL (ref 0–0.1)
IMM GRANULOCYTES NFR BLD AUTO: 0 %
INTERPRETATION, 910389: NORMAL
LDLC SERPL CALC-MCNC: 32 MG/DL (ref 0–99)
LYMPHOCYTES # BLD AUTO: 1.1 X10E3/UL (ref 0.7–3.1)
LYMPHOCYTES NFR BLD AUTO: 24 %
MCH RBC QN AUTO: 33.3 PG (ref 26.6–33)
MCHC RBC AUTO-ENTMCNC: 33.1 G/DL (ref 31.5–35.7)
MCV RBC AUTO: 101 FL (ref 79–97)
MONOCYTES # BLD AUTO: 0.7 X10E3/UL (ref 0.1–0.9)
MONOCYTES NFR BLD AUTO: 16 %
NEUTROPHILS # BLD AUTO: 2.4 X10E3/UL (ref 1.4–7)
NEUTROPHILS NFR BLD AUTO: 53 %
PLATELET # BLD AUTO: 166 X10E3/UL (ref 150–379)
POTASSIUM SERPL-SCNC: 4.8 MMOL/L (ref 3.5–5.2)
PROT SERPL-MCNC: 6.5 G/DL (ref 6–8.5)
RBC # BLD AUTO: 3.63 X10E6/UL (ref 4.14–5.8)
SODIUM SERPL-SCNC: 145 MMOL/L (ref 134–144)
TRIGL SERPL-MCNC: 30 MG/DL (ref 0–149)
VLDLC SERPL CALC-MCNC: 6 MG/DL (ref 5–40)
WBC # BLD AUTO: 4.5 X10E3/UL (ref 3.4–10.8)

## 2019-05-06 ENCOUNTER — OFFICE VISIT (OUTPATIENT)
Dept: INTERNAL MEDICINE CLINIC | Age: 84
End: 2019-05-06

## 2019-05-06 VITALS
WEIGHT: 172 LBS | HEIGHT: 69 IN | DIASTOLIC BLOOD PRESSURE: 68 MMHG | OXYGEN SATURATION: 97 % | TEMPERATURE: 96.8 F | BODY MASS INDEX: 25.48 KG/M2 | HEART RATE: 56 BPM | RESPIRATION RATE: 16 BRPM | SYSTOLIC BLOOD PRESSURE: 144 MMHG

## 2019-05-06 DIAGNOSIS — I77.9 CAROTID ARTERY DISEASE, UNSPECIFIED LATERALITY, UNSPECIFIED TYPE (HCC): ICD-10-CM

## 2019-05-06 DIAGNOSIS — Z79.899 HIGH RISK MEDICATION USE: Primary | ICD-10-CM

## 2019-05-06 DIAGNOSIS — E78.00 PURE HYPERCHOLESTEROLEMIA: ICD-10-CM

## 2019-05-06 DIAGNOSIS — Z13.39 SCREENING FOR ALCOHOLISM: ICD-10-CM

## 2019-05-06 DIAGNOSIS — I10 HYPERTENSION WITH GOAL BLOOD PRESSURE LESS THAN 130/80: ICD-10-CM

## 2019-05-06 DIAGNOSIS — C61 PROSTATE CANCER (HCC): ICD-10-CM

## 2019-05-06 DIAGNOSIS — Z13.31 SCREENING FOR DEPRESSION: ICD-10-CM

## 2019-05-06 RX ORDER — OMEPRAZOLE 20 MG/1
20 CAPSULE, DELAYED RELEASE ORAL
Qty: 90 CAP | Refills: 1 | Status: SHIPPED | OUTPATIENT
Start: 2019-05-06 | End: 2019-12-03 | Stop reason: SDUPTHER

## 2019-05-06 RX ORDER — MELOXICAM 15 MG/1
15 TABLET ORAL DAILY
COMMUNITY
End: 2019-05-06 | Stop reason: SDUPTHER

## 2019-05-06 RX ORDER — IBUPROFEN 200 MG
TABLET ORAL
COMMUNITY
End: 2019-05-06 | Stop reason: ALTCHOICE

## 2019-05-06 RX ORDER — MELOXICAM 15 MG/1
15 TABLET ORAL
Qty: 90 TAB | Refills: 1 | Status: SHIPPED | OUTPATIENT
Start: 2019-05-06 | End: 2019-08-17

## 2019-05-06 NOTE — PROGRESS NOTES
1. Have you been to the ER, urgent care clinic since your last visit? Hospitalized since your last visit? No 
 
2. Have you seen or consulted any other health care providers outside of the [address] [place] [address] since your last visit? Include any pap smears or colon screening. Yes.  Urology-dr China Duff, dr helms-rad onc, dr butcher-oncology in [facility],

## 2019-05-06 NOTE — PATIENT INSTRUCTIONS
Medicare Wellness Visit, Male The best way to live healthy is to have a lifestyle where you eat a well-balanced diet, exercise regularly, limit alcohol use, and quit all forms of tobacco/nicotine, if applicable. Regular preventive services are another way to keep healthy. Preventive services (vaccines, screening tests, monitoring & exams) can help personalize your care plan, which helps you manage your own care. Screening tests can find health problems at the earliest stages, when they are easiest to treat. [address] [facility] follows the current, evidence-based guidelines published by the [facility] Jono Serjio ([facility]) when recommending preventive services for our patients. Because we follow these guidelines, sometimes recommendations change over time as research supports it. (For example, a prostate screening blood test is no longer routinely recommended for men with no symptoms.) Of course, you and your doctor may decide to screen more often for some diseases, based on your risk and co-morbidities (chronic disease you are already diagnosed with). Preventive services for you include: - Medicare offers their members a free annual wellness visit, which is time for you and your primary care provider to discuss and plan for your preventive service needs. Take advantage of this benefit every year! 
-All adults over age 72 should receive the recommended pneumonia vaccines. Current USPSTF guidelines recommend a series of two vaccines for the best pneumonia protection.  
-All adults should have a flu vaccine yearly and an ECG.  All adults age 61 and older should receive a shingles vaccine once in their lifetime.   
-All adults age 38-68 who are overweight should have a diabetes screening test once every three years.  
-Other screening tests & preventive services for persons with diabetes include: an eye exam to screen for diabetic retinopathy, a kidney function test, a foot exam, and stricter control over your cholesterol.  
-Cardiovascular screening for adults with routine risk involves an electrocardiogram (ECG) at intervals determined by the provider.  
-Colorectal cancer screening should be done for adults age 54-65 with no increased risk factors for colorectal cancer. There are a number of acceptable methods of screening for this type of cancer. Each test has its own benefits and drawbacks. Discuss with your provider what is most appropriate for you during your annual wellness visit. The different tests include: colonoscopy (considered the best screening method), a fecal occult blood test, a fecal DNA test, and sigmoidoscopy. 
-All adults born between [facility] should be screened once for Hepatitis C. 
-An Abdominal Aortic Aneurysm (AAA) Screening is recommended for men age 73-68 who has ever smoked in their lifetime. Here is a list of your current Health Maintenance items (your personalized list of preventive services) with a due date: 
Health Maintenance Due Topic Date Due  Glaucoma Screening   12/11/2015 Sotero Hennessy Annual Well Visit  04/25/2019 Body Mass Index: Care Instructions Your Care Instructions Body mass index (BMI) can help you see if your weight is raising your risk for health problems. It uses a formula to compare how much you weigh with how tall you are. · A BMI lower than 18.5 is considered underweight. · A BMI between 18.5 and 24.9 is considered healthy. · A BMI between 25 and 29.9 is considered overweight. A BMI of 30 or higher is considered obese. If your BMI is in the normal range, it means that you have a lower risk for weight-related health problems. If your BMI is in the overweight or obese range, you may be at increased risk for weight-related health problems, such as high blood pressure, heart disease, stroke, arthritis or joint pain, and diabetes.  If your BMI is in the underweight range, you may be at increased risk for health problems such as fatigue, lower protection (immunity) against illness, muscle loss, bone loss, hair loss, and hormone problems. BMI is just one measure of your risk for weight-related health problems. You may be at higher risk for health problems if you are not active, you eat an unhealthy diet, or you drink too much alcohol or use tobacco products. Follow-up care is a key part of your treatment and safety. Be sure to make and go to all appointments, and call your doctor if you are having problems. It's also a good idea to know your test results and keep a list of the medicines you take. How can you care for yourself at home? · Practice healthy eating habits. This includes eating plenty of fruits, vegetables, whole grains, lean protein, and low-fat dairy. · If your doctor recommends it, get more exercise. Walking is a good choice. Bit by bit, increase the amount you walk every day. Try for at least 30 minutes on most days of the week. · Do not smoke. Smoking can increase your risk for health problems. If you need help quitting, talk to your doctor about stop-smoking programs and medicines. These can increase your chances of quitting for good. · Limit alcohol to 2 drinks a day for men and 1 drink a day for women. Too much alcohol can cause health problems. If you have a BMI higher than 25 · Your doctor may do other tests to check your risk for weight-related health problems. This may include measuring the distance around your waist. A waist measurement of more than 40 inches in men or 35 inches in women can increase the risk of weight-related health problems. · Talk with your doctor about steps you can take to stay healthy or improve your health. You may need to make lifestyle changes to lose weight and stay healthy, such as changing your diet and getting regular exercise. If you have a BMI lower than 18.5 · Your doctor may do other tests to check your risk for health problems. · Talk with your doctor about steps you can take to stay healthy or improve your health. You may need to make lifestyle changes to gain or maintain weight and stay healthy, such as getting more healthy foods in your diet and doing exercises to build muscle. Where can you learn more? Go to http://pearl-tessie.info/. Enter S176 in the search box to learn more about \"Body Mass Index: Care Instructions. \" Current as of: October 13, 2016 Content Version: 11.4 © 1155-2493 [employer]. Care instructions adapted under license by [employer] (which disclaims liability or warranty for this information). If you have questions about a medical condition or this instruction, always ask your healthcare professional. [address] any warranty or liability for your use of this information.

## 2019-05-06 NOTE — PROGRESS NOTES
This is the Subsequent Medicare Annual Wellness Exam, performed 12 months or more after the Initial AWV or the last Subsequent AWV I have reviewed the patient's medical history in detail and updated the computerized patient record. History Past Medical History:  
Diagnosis Date  Abnormal PSA 5/9/2011  BPH (benign prostatic hyperplasia)  CAD (coronary artery disease)  Carotid artery disease ([facility] [address] 4/14/2014  ED (erectile dysfunction)  GERD (gastroesophageal reflux disease) 5/21/2012  Hypercholesterolemia  Hypertension  Pancreatic cyst 10/21/2013  Rhinitis 8/23/2010  Sarcopenia 7/15/2013 Past Surgical History:  
Procedure Laterality Date  ENDOSCOPY, COLON, DIAGNOSTIC  2012  HX ORTHOPAEDIC  1951  
 cervical fracture Current Outpatient Medications Medication Sig Dispense Refill  leuprolide acetate (LUPRON DEPOT, 6 MONTH, IM) by IntraMUSCular route.  ibuprofen (ADVIL) 200 mg tablet Take  by mouth.  meloxicam (MOBIC) 15 mg tablet Take 15 mg by mouth daily.  atorvastatin (LIPITOR) 20 mg tablet TAKE 1 TABLET EVERY DAY 90 Tab 3  
 finasteride (PROSCAR) 5 mg tablet TAKE 1 TABLET EVERY DAY 90 Tab 1  
 amLODIPine (NORVASC) 10 mg tablet TAKE 1 TABLET EVERY DAY (Patient taking differently: taking 7.5 mg daily) 90 Tab 1  
 losartan (COZAAR) 50 mg tablet TAKE 1 TABLET EVERY DAY 90 Tab 3  
 LUTEIN PO Take  by mouth.  glucosamine sulfate (GLUCOSAMINE PO) Take  by mouth.  fluticasone (FLONASE) 50 mcg/actuation nasal spray USE 2 SPRAYS IN BOTH NOSTRILS  DAILY. 48 g 1  
 tamsulosin (FLOMAX) 0.4 mg capsule Take 1 Cap by mouth daily. 90 Cap 1  
 aspirin 81 mg chewable tablet Take 81 mg by mouth daily.  sildenafil citrate (VIAGRA) 100 mg tablet Take 1 Tab by mouth as needed. 10 Tab 3 Allergies Allergen Reactions  Zantac [Ranitidine Hcl] Rash No family history on file. Social History Tobacco Use  
  Smoking status: Former Smoker Last attempt to quit: 1965 Years since quittin.7  Smokeless tobacco: Never Used Substance Use Topics  Alcohol use: Yes Alcohol/week: 7.0 oz Types: 14 Shots of liquor per week Patient Active Problem List  
Diagnosis Code  Essential hypertension, benign I10  
 CAD (coronary artery disease) I25.10  Rhinitis J31.0  ED (erectile dysfunction) N52.9  Abnormal PSA R97.20  HLD (hyperlipidemia) E78.5  BPH (benign prostatic hyperplasia) N40.0  GERD (gastroesophageal reflux disease) K21.9  Sarcopenia M62.84  
 Pancreatic cyst K86.2  Carotid artery disease (HCC) I77.9  Coronary artery disease involving native coronary artery without angina pectoris I25.10  Nodule of left lung R91.1  Prostate cancer ([facility] [address] C61 Depression Risk Factor Screening:  
 
3 most recent PHQ Screens 2019 Little interest or pleasure in doing things Not at all Feeling down, depressed, irritable, or hopeless Not at all Total Score PHQ 2 0 Alcohol Risk Factor Screening: You do not drink alcohol or very rarely. Functional Ability and Level of Safety:  
Hearing Loss The patient wears hearing aids. Activities of Daily Living The home contains: no safety equipment. Patient does total self care Fall Risk Fall Risk Assessment, last 12 mths 2019 Able to walk? Yes Fall in past 12 months? No  
 
 
Abuse Screen Patient is not abused Cognitive Screening Evaluation of Cognitive Function: 
Has your family/caregiver stated any concerns about your memory: no 
Normal 
 
Patient Care Team  
Patient Care Team: 
Nick Armstrong MD as PCP - General 
 
Assessment/Plan Education and counseling provided: 
Are appropriate based on today's review and evaluation Influenza Vaccine Diagnoses and all orders for this visit: 1. Screening for alcoholism -     [place] ANNUAL ALCOHOL SCREEN 15 MIN 2. Screening for depression [place] [id number] Health Maintenance Due Topic Date Due  GLAUCOMA SCREENING Q2Y  12/11/2015  MEDICARE YEARLY EXAM  04/25/2019 Discussed the patient's BMI with him. The BMI follow up plan is as follows:  
 
dietary management education, guidance, and counseling 
encourage exercise 
monitor weight 
prescribed dietary intake An After Visit Summary was printed and given to the patient. Advance Care Planning (ACP) Provider [facility] Date of ACP Conversation: 05/06/19 Persons included in Conversation:  patient Length of ACP Conversation in minutes:  <16 minutes (Non-Billable) Authorized Decision Maker (if patient is incapable of making informed decisions): This person is:  
Healthcare Agent/Medical Power of  under Advance Directive For Patients with Decision Making Capacity:  
 
 
Conversation Outcomes / Follow-Up Plan:  
Recommended communicating the plan and making copies for the healthcare agent, personal physician, and others as appropriate (e.g., health system) Subjective: This is an 80year old gentleman with long term hypertension, long term dyslipidemia, normal cardiac cath a number of years ago. No cardiac symptoms. His biggest issue is over the winter of 2019 had radiation therapy external beam.  This was done in [place] because he lives in [place] for the winter. He is now feeling pretty well. He has had a little bit of urinary frequency. He has had a little bit of bowel irregularity. It has only been a month since his last radiation treatment. Just came back to [place] a week or two ago. Is here for the summer. Has a boat. Is active. Exercises. He is a little bit slower than he used to be. He is upset over the slight pudginess that he has developed. He has had no GI symptoms. Orthopedics in [place] gave him a prescription for Meloxicam at his age. He has normal renal function.  He has not had any GERD or indigestion, although does take Omeprazole occasionally for some GI upset. He wanted me to refill it. His labs that were done within the last week showed normal lipids, normal renal function and a slight borderline low anemia secondary to recent radiation. Physical Examination:  His BP was normal.  S1, S2 regular. Lungs clear. Abdomen soft. Neurologically intact. Plan:  I reviewed his labs, reviewed side effects. He can use Meloxicam as needed. I did give him a prescription for Omeprazole to take simultaneously. He will continue to follow lab work every six months. He is doing well in terms of the radiation for his prostate. His PSA last week was 0.0 per the patient. He can discontinue aspirin as he has no stents and the risk/benefit analysis would make that more likely to cause harm than good. Vitals:  
 05/06/19 1327 BP: 144/68 Pulse: (!) 56 Resp: 16 Temp: 96.8 °F (36 °C) TempSrc: Oral  
SpO2: 97% Weight: 172 lb (78 kg) Height: 5' 9\" (1.753 m)  
,problo Stop aspirin advised Diagnoses and all orders for this visit: 
 
1. High risk medication use 2. Screening for alcoholism -     [place] ANNUAL ALCOHOL SCREEN 15 MIN 3. Screening for depression 
-     [facility] 4. Prostate cancer ([facility] [address] 5. Carotid artery disease, unspecified laterality, unspecified type ([facility] [address] 6. Pure hypercholesterolemia 7. Hypertension with goal blood pressure less than 130/80 Other orders 
-     meloxicam (MOBIC) 15 mg tablet; Take 1 Tab by mouth daily as needed for Pain. -     omeprazole (PRILOSEC) 20 mg capsule; Take 1 Cap by mouth daily as needed for Other (gerd).

## 2019-07-29 ENCOUNTER — HOSPITAL ENCOUNTER (OUTPATIENT)
Dept: PREADMISSION TESTING | Age: 84
Discharge: HOME | End: 2019-07-29
Payer: MEDICARE

## 2019-07-29 VITALS
HEIGHT: 69 IN | HEART RATE: 56 BPM | DIASTOLIC BLOOD PRESSURE: 69 MMHG | TEMPERATURE: 97.7 F | BODY MASS INDEX: 24.94 KG/M2 | WEIGHT: 168.4 LBS | SYSTOLIC BLOOD PRESSURE: 145 MMHG | RESPIRATION RATE: 16 BRPM

## 2019-07-29 LAB
ABO + RH BLD: NORMAL
ANION GAP SERPL CALC-SCNC: 7 MMOL/L (ref 5–15)
APPEARANCE UR: ABNORMAL
BACTERIA URNS QL MICRO: NEGATIVE /HPF
BILIRUB UR QL: NEGATIVE
BLOOD GROUP ANTIBODIES SERPL: NORMAL
BUN SERPL-MCNC: 25 MG/DL (ref 6–20)
BUN/CREAT SERPL: 31 (ref 12–20)
CALCIUM SERPL-MCNC: 9.2 MG/DL (ref 8.5–10.1)
CHLORIDE SERPL-SCNC: 108 MMOL/L (ref 97–108)
CO2 SERPL-SCNC: 28 MMOL/L (ref 21–32)
COLOR UR: ABNORMAL
CREAT SERPL-MCNC: 0.81 MG/DL (ref 0.7–1.3)
EPITH CASTS URNS QL MICRO: ABNORMAL /LPF
ERYTHROCYTE [DISTWIDTH] IN BLOOD BY AUTOMATED COUNT: 13.9 % (ref 11.5–14.5)
EST. AVERAGE GLUCOSE BLD GHB EST-MCNC: 114 MG/DL
GLUCOSE SERPL-MCNC: 97 MG/DL (ref 65–100)
GLUCOSE UR STRIP.AUTO-MCNC: NEGATIVE MG/DL
HBA1C MFR BLD: 5.6 % (ref 4.2–6.3)
HCT VFR BLD AUTO: 38.3 % (ref 36.6–50.3)
HGB BLD-MCNC: 12.3 G/DL (ref 12.1–17)
HGB UR QL STRIP: NEGATIVE
HYALINE CASTS URNS QL MICRO: ABNORMAL /LPF (ref 0–5)
INR PPP: 1 (ref 0.9–1.1)
KETONES UR QL STRIP.AUTO: NEGATIVE MG/DL
LEUKOCYTE ESTERASE UR QL STRIP.AUTO: NEGATIVE
MCH RBC QN AUTO: 33.6 PG (ref 26–34)
MCHC RBC AUTO-ENTMCNC: 32.1 G/DL (ref 30–36.5)
MCV RBC AUTO: 104.6 FL (ref 80–99)
NITRITE UR QL STRIP.AUTO: NEGATIVE
NRBC # BLD: 0 K/UL (ref 0–0.01)
NRBC BLD-RTO: 0 PER 100 WBC
PH UR STRIP: 5.5 [PH] (ref 5–8)
PLATELET # BLD AUTO: 128 K/UL (ref 150–400)
PMV BLD AUTO: 12.4 FL (ref 8.9–12.9)
POTASSIUM SERPL-SCNC: 4.1 MMOL/L (ref 3.5–5.1)
PROT UR STRIP-MCNC: NEGATIVE MG/DL
PROTHROMBIN TIME: 10.4 SEC (ref 9–11.1)
RBC # BLD AUTO: 3.66 M/UL (ref 4.1–5.7)
RBC #/AREA URNS HPF: ABNORMAL /HPF (ref 0–5)
SODIUM SERPL-SCNC: 143 MMOL/L (ref 136–145)
SP GR UR REFRACTOMETRY: 1.02 (ref 1–1.03)
SPECIMEN EXP DATE BLD: NORMAL
UA: UC IF INDICATED,UAUC: ABNORMAL
UROBILINOGEN UR QL STRIP.AUTO: 0.2 EU/DL (ref 0.2–1)
WBC # BLD AUTO: 4.6 K/UL (ref 4.1–11.1)
WBC URNS QL MICRO: ABNORMAL /HPF (ref 0–4)

## 2019-07-29 PROCEDURE — 93005 ELECTROCARDIOGRAM TRACING: CPT

## 2019-07-29 PROCEDURE — 83036 HEMOGLOBIN GLYCOSYLATED A1C: CPT

## 2019-07-29 PROCEDURE — 86900 BLOOD TYPING SEROLOGIC ABO: CPT

## 2019-07-29 PROCEDURE — 81001 URINALYSIS AUTO W/SCOPE: CPT

## 2019-07-29 PROCEDURE — 85610 PROTHROMBIN TIME: CPT

## 2019-07-29 PROCEDURE — 85027 COMPLETE CBC AUTOMATED: CPT

## 2019-07-29 PROCEDURE — 80048 BASIC METABOLIC PNL TOTAL CA: CPT

## 2019-07-29 PROCEDURE — 36415 COLL VENOUS BLD VENIPUNCTURE: CPT

## 2019-07-29 RX ORDER — CHOLECALCIFEROL TAB 125 MCG (5000 UNIT) 125 MCG
5000 TAB ORAL DAILY
COMMUNITY

## 2019-07-29 RX ORDER — VITAMIN E 268 MG
400 CAPSULE ORAL DAILY
COMMUNITY
End: 2020-12-14 | Stop reason: ALTCHOICE

## 2019-07-29 RX ORDER — BISMUTH SUBSALICYLATE 262 MG
1 TABLET,CHEWABLE ORAL DAILY
COMMUNITY

## 2019-07-29 NOTE — PERIOP NOTES
DO NOT EAT ANYTHING AFTER MIDNIGHT, except as instructed THE NIGHT BEFORE SURGERY. PT GIVEN OPPORTUNITY TO ASK ADDITIONAL QUESTIONS. PRE-OPERATIVE INSTRUCTIONS REVIEWED WITH PATIENT. PATIENT INSTRUCTED TO PURCHASE 2-BOTTLES OF CHG SOAP. INSTRUCTIONS REVIEWED IN CLASS ON USE OF CHG SOAP. PATIENT GIVEN SSI INFECTION FAQ SHEET. MRSA / MSSA TREATMENT INSTRUCTION SHEET GIVEN WITH AN EXPLANATION TO PATIENT THAT THEY WILL BE NOTIFIED IF TREATMENT INSTRUCTIONS NEED TO BE INITIATED. PATIENT WAS GIVEN THE OPPORTUNITY TO ASK QUESTIONS ON THE INFORMATION PROVIDED.

## 2019-07-30 LAB
ATRIAL RATE: 54 BPM
BACTERIA SPEC CULT: NORMAL
BACTERIA SPEC CULT: NORMAL
CALCULATED P AXIS, ECG09: 26 DEGREES
CALCULATED R AXIS, ECG10: -8 DEGREES
CALCULATED T AXIS, ECG11: -34 DEGREES
DIAGNOSIS, 93000: NORMAL
P-R INTERVAL, ECG05: 188 MS
Q-T INTERVAL, ECG07: 478 MS
QRS DURATION, ECG06: 84 MS
QTC CALCULATION (BEZET), ECG08: 453 MS
SERVICE CMNT-IMP: NORMAL
VENTRICULAR RATE, ECG03: 54 BPM

## 2019-07-30 NOTE — PERIOP NOTES
Faxed PAT testing reports to Dr. Noemi Ruiz. Called Voicemail message left for Bharati/Dr. Bay's office RE: abnormal RBC 3.66, platelets 431. Judithe Fairland

## 2019-08-15 ENCOUNTER — ANESTHESIA EVENT (OUTPATIENT)
Dept: SURGERY | Age: 84
DRG: 470 | End: 2019-08-15
Payer: MEDICARE

## 2019-08-15 NOTE — H&P
Subjective:   Patient ID: Bradly Alexander is a 80 y.o. male. Pain Score:   3  Chief Complaint: Follow-up of the Left Knee        49-year-old male with history of left knee pain is been going on for a while. He has been dealing with it with injections and activity modification. As long as he stays active it does not bother him too much. Comes back in because is more recently been uncomfortable. He had an injection at his last visit back in beginning of May and got really no relief from that. He is talk to his wife has interested in a partial knee replacement as he was recommended fluid appeared he has a big event he needs to be ready for the beginning of August so he wants to have it done soon enough to be ready for that.     REVIEW OF SYSTEMS:  On ROS the patient complains only of knee pain. They deny any other positive findings. The patient specifically denies any fever, chills, nausea or vomiting. They deny any redness or erythema. They deny any numbness or tingling. They deny any decrease in urination or decrease in urine production.   They deny any other complaints of joint pain or swelling.     Medical History        Past Medical History:   Diagnosis Date    Cancer      Hyperlipidemia      Hypertension      Osteoarthritis           Surgical History         Past Surgical History:   Procedure Laterality Date    KNEE SURGERY        NO RELEVANT SURGERIES                   Family History   Problem Relation Age of Onset    No Known Problems Mother      Coronary artery disease Father      No Known Problems Brother      No Known Problems Sister        [facility]  Review of Systems   5/28/2019     Constitutional: Unexplained: Negative  Genitourinary: Frequent Urination: Positive  HEENT: Vision Loss: Negative  Neurological: Memory Loss: Negative  Integumentary: Rash: Negative  Cardiovascular: Palpatations: Negative  Hematologic: Bruises/Bleeds Easily: Positive  Gastrointestinal: Constipation: Negative  Immunological: Seasonal Allergies: Positive  Musculoskeletal: Joint Pain: Positive  Objective:   Constitutional:  No acute distress. Well nourished. Well developed. Psychiatric: Alert and oriented x3. Cardiovascular:  No marked edema.   Skin:  No skin Changes or Rashes  Neurological:  No Obvious Neuro Deficits  Vitals       Vitals:     05/28/19 1059   BP: 127/66   Pulse: 68   Weight: 170 lb   Height: 5' 9\"              Patient Active Problem List    Diagnosis Date Noted    Arthritis of knee, left 08/16/2019    Prostate cancer ([facility] [address] 05/06/2019    Nodule of left lung 11/12/2018    Coronary artery disease involving native coronary artery without angina pectoris 10/05/2015    Carotid artery disease ([address] 04/14/2014    Pancreatic cyst 10/21/2013    Sarcopenia 07/15/2013    GERD (gastroesophageal reflux disease) 05/21/2012    HLD (hyperlipidemia) 02/01/2012    BPH (benign prostatic hyperplasia) 02/01/2012    ED (erectile dysfunction) 05/09/2011    Abnormal PSA 05/09/2011    Rhinitis 08/23/2010    Essential hypertension, benign 06/29/2010    CAD (coronary artery disease) 06/29/2010     Past Medical History:   Diagnosis Date    Abnormal PSA 05/09/2011    Arrhythmia     OCCASIONAL IRREGULAR BEAT    BPH (benign prostatic hyperplasia)     CAD (coronary artery disease)     CALCIUM BUILD-UP NOTED    Cancer ([address] 04/2019    PROSTATE    Carotid artery disease ([address] 04/14/2014    ED (erectile dysfunction)     GERD (gastroesophageal reflux disease) 5/21/2012    Hypercholesterolemia     Hypertension     Pancreatic cyst 10/21/2013    Rhinitis 8/23/2010    Sarcopenia 7/15/2013      Past Surgical History:   Procedure Laterality Date    CARDIAC SURG PROCEDURE UNLIST  1995    CARDIAC CATHETERIZATION    ENDOSCOPY, COLON, DIAGNOSTIC  2012    HX CATARACT REMOVAL Bilateral     HX HEMORRHOIDECTOMY      HX HERNIA REPAIR      HX KNEE ARTHROSCOPY      HX TONSILLECTOMY      AGE 12 YEARS      Prior to Admission medications    Medication Sig Start Date End Date Taking? Authorizing Provider   multivitamin (ONE A DAY) tablet Take 1 Tab by mouth daily. Yes Provider, Historical   ascorbic acid (JOSE-C PO) Take 1,000 mg by mouth daily. Yes Provider, Historical   vitamin E (AQUA GEMS) 400 unit capsule Take 400 Units by mouth daily. Yes Provider, Historical   cholecalciferol, VITAMIN D3, (VITAMIN D3) 5,000 unit tab tablet Take 5,000 Units by mouth daily. Yes Provider, Historical   COQ10, LIPOSOMAL UBIQUINOL, PO Take 200 mg by mouth daily. Yes Provider, Historical   meloxicam (MOBIC) 15 mg tablet Take 1 Tab by mouth daily as needed for Pain. 5/6/19  Yes Norma Meraz MD   omeprazole (PRILOSEC) 20 mg capsule Take 1 Cap by mouth daily as needed for Other (gerd). 5/6/19  Yes Norma Meraz MD   atorvastatin (LIPITOR) 20 mg tablet TAKE 1 TABLET EVERY DAY  Patient taking differently: 20 mg nightly. 4/19/19  Yes Norma Meraz MD   finasteride (PROSCAR) 5 mg tablet TAKE 1 TABLET EVERY DAY 4/11/19  Yes Norma Meraz MD   amLODIPine (NORVASC) 10 mg tablet TAKE 1 TABLET EVERY DAY  Patient taking differently: taking 7.5 mg daily 4/11/19  Yes Norma Meraz MD   losartan (COZAAR) 50 mg tablet TAKE 1 TABLET EVERY DAY 3/7/19  Yes Norma Meraz MD   fluticasone (FLONASE) 50 mcg/actuation nasal spray USE 2 SPRAYS IN BOTH NOSTRILS  DAILY. Patient taking differently: daily as needed. 9/29/18  Yes Norma Meraz MD   tamsulosin (FLOMAX) 0.4 mg capsule Take 1 Cap by mouth daily. Patient taking differently: Take 0.4 mg by mouth nightly. 2/15/16  Yes Norma Meraz MD   leuprolide acetate (LUPRON DEPOT, 6 MONTH, IM) by IntraMUSCular route.  RECEIVED LAST DOSE:5/2019    Provider, Historical     Allergies   Allergen Reactions    Zantac [Ranitidine Hcl] Rash      Social History     Tobacco Use    Smoking status: Former Smoker     Packs/day: 1.00     Last attempt to quit: 8/23/1965     Years since quittin.0    Smokeless tobacco: Never Used   Substance Use Topics    Alcohol use: Yes     Alcohol/week: 11.7 standard drinks     Types: 14 Shots of liquor per week      Family History   Problem Relation Age of Onset    Alzheimer Mother     Heart Attack Father         MI            Patient Vitals for the past 8 hrs:   BP Temp Pulse Resp SpO2 Weight   19 0631 162/77 98 °F (36.7 °C) 60 15 95 % 76.2 kg (168 lb)     General appearance: alert, cooperative, no distress, appears stated age  Head: Normocephalic, without obvious abnormality, atraumatic  Lungs: clear to auscultation bilaterally  Heart: regular rate and rhythm, S1, S2 normal, no murmur  Abdomen: soft, non-tender. Bowel sounds normal. No masses,  no organomegaly  Extremities: Walks with an antalgic gait today on the affected left knee. .  Left knee:  No skin changes, rashes, erythema, deformity, or bruising. Minimal to moderate effusion. Tenderness in the medial joint line. Mild varus alignment. Pseudo opening on valgus stress testing. No opening on varus or anterior-posterior stress testing. Mild crepitance along the medial joint line. Negative Lachman's negative drawer. Normal patellar tracking. Good quad and hamstring strength good pulses good sensation good cap refill and no edema distally. Range of motion is lacking about 2 degrees full extension. Slight pain with compression of patellofemoral joint particularly with resisted knee extension. Right knee:  No skin changes, rashes, erythema, deformity, or bruising. Minimal effusion. Tenderness in the medial joint line. Mild varus alignment. Mild pseudo opening on valgus stress testing. No opening on varus or anterior-posterior stress testing. No crepitance along the medial joint line. Negative Lachman's negative drawer. Normal patellar tracking. Good quad and hamstring strength good pulses good sensation good cap refill and no edema distally. Range of motion is full.   Pulses: 2+ and symmetric  Neurologic: Grossly normal       Procedures:    Procedures     Radiographs:           No imaging obtained       Assessment:      1. Primary osteoarthritis of left knee          Plan:      He is more interested in knee replacement now because the shot was not effective. He has talk both about partial and total knee replacement. His wife has had 2 total knees l. He was told he should get a partial by the doctor in [place]. In discussion today he may have some patellofemoral symptoms. His x-ray shows some involvement of the patellofemoral joint. He is considering what to do. She probably wants to go for with either total or partial although his schedule is such that I am concerned as to whether he could be ready for in August bow trip. We will see how he wants to proceed. He is definitely will be ready to do aggressive activity at the beginning of August.         Orders Placed This Encounter    BMI >=25 PATIENT INSTRUCTIONS & EDUCATION      Return if symptoms worsen or fail to improve, for surgery.      Mary Cuevas MD               Patient was seen and examined. There have been no significant clinical changes since the completion of the above History and Physical.  Patient identified by surgeon; surgical site was confirmed by patient and surgeon.

## 2019-08-16 ENCOUNTER — APPOINTMENT (OUTPATIENT)
Dept: GENERAL RADIOLOGY | Age: 84
DRG: 470 | End: 2019-08-16
Attending: ORTHOPAEDIC SURGERY
Payer: MEDICARE

## 2019-08-16 ENCOUNTER — ANESTHESIA (OUTPATIENT)
Dept: SURGERY | Age: 84
DRG: 470 | End: 2019-08-16
Payer: MEDICARE

## 2019-08-16 ENCOUNTER — HOSPITAL ENCOUNTER (INPATIENT)
Age: 84
LOS: 1 days | Discharge: HOME HEALTH | DRG: 470 | End: 2019-08-17
Attending: ORTHOPAEDIC SURGERY | Admitting: ORTHOPAEDIC SURGERY
Payer: MEDICARE

## 2019-08-16 DIAGNOSIS — M17.12 ARTHRITIS OF KNEE, LEFT: Primary | ICD-10-CM

## 2019-08-16 LAB
ABO + RH BLD: NORMAL
BLOOD GROUP ANTIBODIES SERPL: NORMAL
SPECIMEN EXP DATE BLD: NORMAL

## 2019-08-16 PROCEDURE — 74011250637 HC RX REV CODE- 250/637: Performed by: ORTHOPAEDIC SURGERY

## 2019-08-16 PROCEDURE — 77030002933 HC SUT MCRYL J&J -A: Performed by: ORTHOPAEDIC SURGERY

## 2019-08-16 PROCEDURE — 77030018846 HC SOL IRR STRL H20 ICUM -A: Performed by: ORTHOPAEDIC SURGERY

## 2019-08-16 PROCEDURE — 97530 THERAPEUTIC ACTIVITIES: CPT

## 2019-08-16 PROCEDURE — C1776 JOINT DEVICE (IMPLANTABLE): HCPCS | Performed by: ORTHOPAEDIC SURGERY

## 2019-08-16 PROCEDURE — 77030018673: Performed by: ORTHOPAEDIC SURGERY

## 2019-08-16 PROCEDURE — 77030039497 HC CST PAD STERILE CHCS -A: Performed by: ORTHOPAEDIC SURGERY

## 2019-08-16 PROCEDURE — 74011000250 HC RX REV CODE- 250: Performed by: NURSE ANESTHETIST, CERTIFIED REGISTERED

## 2019-08-16 PROCEDURE — 74011000250 HC RX REV CODE- 250: Performed by: ORTHOPAEDIC SURGERY

## 2019-08-16 PROCEDURE — 76060000036 HC ANESTHESIA 2.5 TO 3 HR: Performed by: ORTHOPAEDIC SURGERY

## 2019-08-16 PROCEDURE — 94762 N-INVAS EAR/PLS OXIMTRY CONT: CPT

## 2019-08-16 PROCEDURE — 77030040361 HC SLV COMPR DVT MDII -B

## 2019-08-16 PROCEDURE — 74011250636 HC RX REV CODE- 250/636

## 2019-08-16 PROCEDURE — 77030027138 HC INCENT SPIROMETER -A

## 2019-08-16 PROCEDURE — 0SRD0J9 REPLACEMENT OF LEFT KNEE JOINT WITH SYNTHETIC SUBSTITUTE, CEMENTED, OPEN APPROACH: ICD-10-PCS | Performed by: ORTHOPAEDIC SURGERY

## 2019-08-16 PROCEDURE — 77030002912 HC SUT ETHBND J&J -A: Performed by: ORTHOPAEDIC SURGERY

## 2019-08-16 PROCEDURE — 77030018836 HC SOL IRR NACL ICUM -A: Performed by: ORTHOPAEDIC SURGERY

## 2019-08-16 PROCEDURE — 76010000171 HC OR TIME 2 TO 2.5 HR INTENSV-TIER 1: Performed by: ORTHOPAEDIC SURGERY

## 2019-08-16 PROCEDURE — 77030014077 HC TOWER MX CEM J&J -C: Performed by: ORTHOPAEDIC SURGERY

## 2019-08-16 PROCEDURE — 73560 X-RAY EXAM OF KNEE 1 OR 2: CPT

## 2019-08-16 PROCEDURE — 74011000258 HC RX REV CODE- 258: Performed by: ORTHOPAEDIC SURGERY

## 2019-08-16 PROCEDURE — 77030040361 HC SLV COMPR DVT MDII -B: Performed by: ORTHOPAEDIC SURGERY

## 2019-08-16 PROCEDURE — 77030034850: Performed by: ORTHOPAEDIC SURGERY

## 2019-08-16 PROCEDURE — 86900 BLOOD TYPING SEROLOGIC ABO: CPT

## 2019-08-16 PROCEDURE — 74011250636 HC RX REV CODE- 250/636: Performed by: NURSE ANESTHETIST, CERTIFIED REGISTERED

## 2019-08-16 PROCEDURE — C9290 INJ, BUPIVACAINE LIPOSOME: HCPCS | Performed by: ORTHOPAEDIC SURGERY

## 2019-08-16 PROCEDURE — 77030011640 HC PAD GRND REM COVD -A: Performed by: ORTHOPAEDIC SURGERY

## 2019-08-16 PROCEDURE — 77030006835 HC BLD SAW SAG STRY -B: Performed by: ORTHOPAEDIC SURGERY

## 2019-08-16 PROCEDURE — C1713 ANCHOR/SCREW BN/BN,TIS/BN: HCPCS | Performed by: ORTHOPAEDIC SURGERY

## 2019-08-16 PROCEDURE — 74011250636 HC RX REV CODE- 250/636: Performed by: ORTHOPAEDIC SURGERY

## 2019-08-16 PROCEDURE — 97161 PT EVAL LOW COMPLEX 20 MIN: CPT

## 2019-08-16 PROCEDURE — 51798 US URINE CAPACITY MEASURE: CPT

## 2019-08-16 PROCEDURE — 77030031139 HC SUT VCRL2 J&J -A: Performed by: ORTHOPAEDIC SURGERY

## 2019-08-16 PROCEDURE — 77010033678 HC OXYGEN DAILY

## 2019-08-16 PROCEDURE — 77030035236 HC SUT PDS STRATFX BARB J&J -B: Performed by: ORTHOPAEDIC SURGERY

## 2019-08-16 PROCEDURE — 36415 COLL VENOUS BLD VENIPUNCTURE: CPT

## 2019-08-16 PROCEDURE — 77030028907 HC WRP KNEE WO BGS SOLM -B

## 2019-08-16 PROCEDURE — 74011250637 HC RX REV CODE- 250/637: Performed by: PHYSICIAN ASSISTANT

## 2019-08-16 PROCEDURE — 77030000032 HC CUF TRNQT ZIMM -B: Performed by: ORTHOPAEDIC SURGERY

## 2019-08-16 PROCEDURE — 76210000006 HC OR PH I REC 0.5 TO 1 HR: Performed by: ORTHOPAEDIC SURGERY

## 2019-08-16 PROCEDURE — 65270000029 HC RM PRIVATE

## 2019-08-16 DEVICE — IMPLANTABLE DEVICE
Type: IMPLANTABLE DEVICE | Site: KNEE | Status: FUNCTIONAL
Brand: PERSONA™

## 2019-08-16 DEVICE — IMPLANTABLE DEVICE
Type: IMPLANTABLE DEVICE | Site: KNEE | Status: FUNCTIONAL
Brand: PERSONA® VIVACIT-E®

## 2019-08-16 DEVICE — KIT TKR CEM VIT E SURF AND INSTRMT: Type: IMPLANTABLE DEVICE | Status: FUNCTIONAL

## 2019-08-16 DEVICE — IMPLANTABLE DEVICE
Type: IMPLANTABLE DEVICE | Site: KNEE | Status: FUNCTIONAL
Brand: PERSONA® NATURAL TIBIA®

## 2019-08-16 DEVICE — SMARTSET HIGH PERFORMANCE MV MEDIUM VISCOSITY BONE CEMENT 40G
Type: IMPLANTABLE DEVICE | Site: KNEE | Status: FUNCTIONAL
Brand: SMARTSET

## 2019-08-16 DEVICE — IMPLANTABLE DEVICE
Type: IMPLANTABLE DEVICE | Site: KNEE | Status: FUNCTIONAL
Brand: PERSONA®

## 2019-08-16 RX ORDER — DIPHENHYDRAMINE HYDROCHLORIDE 50 MG/ML
12.5 INJECTION, SOLUTION INTRAMUSCULAR; INTRAVENOUS AS NEEDED
Status: DISCONTINUED | OUTPATIENT
Start: 2019-08-16 | End: 2019-08-16 | Stop reason: HOSPADM

## 2019-08-16 RX ORDER — EPHEDRINE SULFATE/0.9% NACL/PF 50 MG/5 ML
SYRINGE (ML) INTRAVENOUS AS NEEDED
Status: DISCONTINUED | OUTPATIENT
Start: 2019-08-16 | End: 2019-08-16 | Stop reason: HOSPADM

## 2019-08-16 RX ORDER — SODIUM CHLORIDE 0.9 % (FLUSH) 0.9 %
5-40 SYRINGE (ML) INJECTION EVERY 8 HOURS
Status: DISCONTINUED | OUTPATIENT
Start: 2019-08-16 | End: 2019-08-16 | Stop reason: HOSPADM

## 2019-08-16 RX ORDER — HYDROMORPHONE HYDROCHLORIDE 1 MG/ML
0.2 INJECTION, SOLUTION INTRAMUSCULAR; INTRAVENOUS; SUBCUTANEOUS
Status: DISCONTINUED | OUTPATIENT
Start: 2019-08-16 | End: 2019-08-16 | Stop reason: HOSPADM

## 2019-08-16 RX ORDER — OXYCODONE HYDROCHLORIDE 5 MG/1
5 TABLET ORAL
Status: DISCONTINUED | OUTPATIENT
Start: 2019-08-16 | End: 2019-08-17 | Stop reason: HOSPADM

## 2019-08-16 RX ORDER — CEFAZOLIN SODIUM 1 G/3ML
INJECTION, POWDER, FOR SOLUTION INTRAMUSCULAR; INTRAVENOUS AS NEEDED
Status: DISCONTINUED | OUTPATIENT
Start: 2019-08-16 | End: 2019-08-16 | Stop reason: HOSPADM

## 2019-08-16 RX ORDER — FLUTICASONE PROPIONATE 50 MCG
2 SPRAY, SUSPENSION (ML) NASAL
Status: DISCONTINUED | OUTPATIENT
Start: 2019-08-16 | End: 2019-08-17 | Stop reason: HOSPADM

## 2019-08-16 RX ORDER — FENTANYL CITRATE 50 UG/ML
50 INJECTION, SOLUTION INTRAMUSCULAR; INTRAVENOUS AS NEEDED
Status: DISCONTINUED | OUTPATIENT
Start: 2019-08-16 | End: 2019-08-16 | Stop reason: HOSPADM

## 2019-08-16 RX ORDER — AMOXICILLIN 250 MG
1 CAPSULE ORAL 2 TIMES DAILY
Status: DISCONTINUED | OUTPATIENT
Start: 2019-08-16 | End: 2019-08-17 | Stop reason: HOSPADM

## 2019-08-16 RX ORDER — DEXAMETHASONE SODIUM PHOSPHATE 4 MG/ML
INJECTION, SOLUTION INTRA-ARTICULAR; INTRALESIONAL; INTRAMUSCULAR; INTRAVENOUS; SOFT TISSUE AS NEEDED
Status: DISCONTINUED | OUTPATIENT
Start: 2019-08-16 | End: 2019-08-16 | Stop reason: HOSPADM

## 2019-08-16 RX ORDER — FINASTERIDE 5 MG/1
5 TABLET, FILM COATED ORAL DAILY
Status: DISCONTINUED | OUTPATIENT
Start: 2019-08-17 | End: 2019-08-16

## 2019-08-16 RX ORDER — AMLODIPINE BESYLATE 5 MG/1
7.5 TABLET ORAL DAILY
Status: DISCONTINUED | OUTPATIENT
Start: 2019-08-17 | End: 2019-08-17 | Stop reason: HOSPADM

## 2019-08-16 RX ORDER — MIDAZOLAM HYDROCHLORIDE 1 MG/ML
1 INJECTION, SOLUTION INTRAMUSCULAR; INTRAVENOUS AS NEEDED
Status: DISCONTINUED | OUTPATIENT
Start: 2019-08-16 | End: 2019-08-16 | Stop reason: HOSPADM

## 2019-08-16 RX ORDER — SODIUM CHLORIDE 0.9 % (FLUSH) 0.9 %
5-40 SYRINGE (ML) INJECTION AS NEEDED
Status: DISCONTINUED | OUTPATIENT
Start: 2019-08-16 | End: 2019-08-16 | Stop reason: HOSPADM

## 2019-08-16 RX ORDER — ONDANSETRON 2 MG/ML
INJECTION INTRAMUSCULAR; INTRAVENOUS AS NEEDED
Status: DISCONTINUED | OUTPATIENT
Start: 2019-08-16 | End: 2019-08-16 | Stop reason: HOSPADM

## 2019-08-16 RX ORDER — TAMSULOSIN HYDROCHLORIDE 0.4 MG/1
0.4 CAPSULE ORAL
Status: DISCONTINUED | OUTPATIENT
Start: 2019-08-16 | End: 2019-08-17 | Stop reason: HOSPADM

## 2019-08-16 RX ORDER — NALOXONE HYDROCHLORIDE 0.4 MG/ML
0.4 INJECTION, SOLUTION INTRAMUSCULAR; INTRAVENOUS; SUBCUTANEOUS AS NEEDED
Status: DISCONTINUED | OUTPATIENT
Start: 2019-08-16 | End: 2019-08-17 | Stop reason: HOSPADM

## 2019-08-16 RX ORDER — PANTOPRAZOLE SODIUM 40 MG/1
40 TABLET, DELAYED RELEASE ORAL
Status: DISCONTINUED | OUTPATIENT
Start: 2019-08-16 | End: 2019-08-16

## 2019-08-16 RX ORDER — SODIUM CHLORIDE, SODIUM LACTATE, POTASSIUM CHLORIDE, CALCIUM CHLORIDE 600; 310; 30; 20 MG/100ML; MG/100ML; MG/100ML; MG/100ML
125 INJECTION, SOLUTION INTRAVENOUS CONTINUOUS
Status: DISCONTINUED | OUTPATIENT
Start: 2019-08-16 | End: 2019-08-16 | Stop reason: HOSPADM

## 2019-08-16 RX ORDER — SODIUM CHLORIDE 0.9 % (FLUSH) 0.9 %
5-40 SYRINGE (ML) INJECTION EVERY 8 HOURS
Status: DISCONTINUED | OUTPATIENT
Start: 2019-08-16 | End: 2019-08-17 | Stop reason: HOSPADM

## 2019-08-16 RX ORDER — FACIAL-BODY WIPES
10 EACH TOPICAL DAILY PRN
Status: DISCONTINUED | OUTPATIENT
Start: 2019-08-18 | End: 2019-08-17 | Stop reason: HOSPADM

## 2019-08-16 RX ORDER — CELECOXIB 200 MG/1
200 CAPSULE ORAL EVERY 12 HOURS
Status: DISCONTINUED | OUTPATIENT
Start: 2019-08-16 | End: 2019-08-17 | Stop reason: HOSPADM

## 2019-08-16 RX ORDER — OXYCODONE AND ACETAMINOPHEN 5; 325 MG/1; MG/1
1 TABLET ORAL AS NEEDED
Status: DISCONTINUED | OUTPATIENT
Start: 2019-08-16 | End: 2019-08-16 | Stop reason: HOSPADM

## 2019-08-16 RX ORDER — PROPOFOL 10 MG/ML
INJECTION, EMULSION INTRAVENOUS AS NEEDED
Status: DISCONTINUED | OUTPATIENT
Start: 2019-08-16 | End: 2019-08-16 | Stop reason: HOSPADM

## 2019-08-16 RX ORDER — POLYETHYLENE GLYCOL 3350 17 G/17G
17 POWDER, FOR SOLUTION ORAL DAILY
Status: DISCONTINUED | OUTPATIENT
Start: 2019-08-17 | End: 2019-08-17 | Stop reason: HOSPADM

## 2019-08-16 RX ORDER — SODIUM CHLORIDE 9 MG/ML
25 INJECTION, SOLUTION INTRAVENOUS CONTINUOUS
Status: DISCONTINUED | OUTPATIENT
Start: 2019-08-16 | End: 2019-08-16 | Stop reason: HOSPADM

## 2019-08-16 RX ORDER — ACETAMINOPHEN 500 MG
1000 TABLET ORAL EVERY 6 HOURS
Status: DISCONTINUED | OUTPATIENT
Start: 2019-08-16 | End: 2019-08-16

## 2019-08-16 RX ORDER — OXYCODONE HYDROCHLORIDE 5 MG/1
10 TABLET ORAL
Status: DISCONTINUED | OUTPATIENT
Start: 2019-08-16 | End: 2019-08-17 | Stop reason: HOSPADM

## 2019-08-16 RX ORDER — BUPIVACAINE HYDROCHLORIDE 5 MG/ML
INJECTION, SOLUTION EPIDURAL; INTRACAUDAL AS NEEDED
Status: DISCONTINUED | OUTPATIENT
Start: 2019-08-16 | End: 2019-08-16 | Stop reason: HOSPADM

## 2019-08-16 RX ORDER — ACETAMINOPHEN 500 MG
1000 TABLET ORAL ONCE
Status: COMPLETED | OUTPATIENT
Start: 2019-08-16 | End: 2019-08-16

## 2019-08-16 RX ORDER — SODIUM CHLORIDE 0.9 % (FLUSH) 0.9 %
5-40 SYRINGE (ML) INJECTION AS NEEDED
Status: DISCONTINUED | OUTPATIENT
Start: 2019-08-16 | End: 2019-08-17 | Stop reason: HOSPADM

## 2019-08-16 RX ORDER — MORPHINE SULFATE 10 MG/ML
2 INJECTION, SOLUTION INTRAMUSCULAR; INTRAVENOUS
Status: DISCONTINUED | OUTPATIENT
Start: 2019-08-16 | End: 2019-08-16 | Stop reason: HOSPADM

## 2019-08-16 RX ORDER — SODIUM CHLORIDE 9 MG/ML
125 INJECTION, SOLUTION INTRAVENOUS CONTINUOUS
Status: DISPENSED | OUTPATIENT
Start: 2019-08-16 | End: 2019-08-17

## 2019-08-16 RX ORDER — GLYCOPYRROLATE 0.2 MG/ML
INJECTION INTRAMUSCULAR; INTRAVENOUS AS NEEDED
Status: DISCONTINUED | OUTPATIENT
Start: 2019-08-16 | End: 2019-08-16 | Stop reason: HOSPADM

## 2019-08-16 RX ORDER — OXYCODONE HYDROCHLORIDE 5 MG/1
5 TABLET ORAL
Qty: 42 TAB | Refills: 0 | Status: SHIPPED | OUTPATIENT
Start: 2019-08-16 | End: 2019-08-23

## 2019-08-16 RX ORDER — ASPIRIN 325 MG
325 TABLET, DELAYED RELEASE (ENTERIC COATED) ORAL EVERY 12 HOURS
Status: DISCONTINUED | OUTPATIENT
Start: 2019-08-16 | End: 2019-08-17 | Stop reason: HOSPADM

## 2019-08-16 RX ORDER — MIDAZOLAM HYDROCHLORIDE 1 MG/ML
0.5 INJECTION, SOLUTION INTRAMUSCULAR; INTRAVENOUS
Status: DISCONTINUED | OUTPATIENT
Start: 2019-08-16 | End: 2019-08-16 | Stop reason: HOSPADM

## 2019-08-16 RX ORDER — DIPHENHYDRAMINE HYDROCHLORIDE 50 MG/ML
12.5 INJECTION, SOLUTION INTRAMUSCULAR; INTRAVENOUS
Status: ACTIVE | OUTPATIENT
Start: 2019-08-16 | End: 2019-08-17

## 2019-08-16 RX ORDER — CEFAZOLIN SODIUM/WATER 2 G/20 ML
2 SYRINGE (ML) INTRAVENOUS EVERY 8 HOURS
Status: COMPLETED | OUTPATIENT
Start: 2019-08-16 | End: 2019-08-17

## 2019-08-16 RX ORDER — HYDROMORPHONE HYDROCHLORIDE 1 MG/ML
0.5 INJECTION, SOLUTION INTRAMUSCULAR; INTRAVENOUS; SUBCUTANEOUS
Status: ACTIVE | OUTPATIENT
Start: 2019-08-16 | End: 2019-08-17

## 2019-08-16 RX ORDER — OMEPRAZOLE 20 MG/1
20 CAPSULE, DELAYED RELEASE ORAL
Status: DISCONTINUED | OUTPATIENT
Start: 2019-08-16 | End: 2019-08-16 | Stop reason: CLARIF

## 2019-08-16 RX ORDER — ONDANSETRON 2 MG/ML
4 INJECTION INTRAMUSCULAR; INTRAVENOUS
Status: ACTIVE | OUTPATIENT
Start: 2019-08-16 | End: 2019-08-17

## 2019-08-16 RX ORDER — PANTOPRAZOLE SODIUM 40 MG/1
40 TABLET, DELAYED RELEASE ORAL
Status: DISCONTINUED | OUTPATIENT
Start: 2019-08-17 | End: 2019-08-17 | Stop reason: HOSPADM

## 2019-08-16 RX ORDER — LIDOCAINE HYDROCHLORIDE 10 MG/ML
0.1 INJECTION, SOLUTION EPIDURAL; INFILTRATION; INTRACAUDAL; PERINEURAL AS NEEDED
Status: DISCONTINUED | OUTPATIENT
Start: 2019-08-16 | End: 2019-08-16 | Stop reason: HOSPADM

## 2019-08-16 RX ORDER — ONDANSETRON 2 MG/ML
4 INJECTION INTRAMUSCULAR; INTRAVENOUS AS NEEDED
Status: DISCONTINUED | OUTPATIENT
Start: 2019-08-16 | End: 2019-08-16 | Stop reason: HOSPADM

## 2019-08-16 RX ORDER — MIDAZOLAM HYDROCHLORIDE 1 MG/ML
INJECTION, SOLUTION INTRAMUSCULAR; INTRAVENOUS AS NEEDED
Status: DISCONTINUED | OUTPATIENT
Start: 2019-08-16 | End: 2019-08-16 | Stop reason: HOSPADM

## 2019-08-16 RX ORDER — PROPOFOL 10 MG/ML
INJECTION, EMULSION INTRAVENOUS
Status: DISCONTINUED | OUTPATIENT
Start: 2019-08-16 | End: 2019-08-16 | Stop reason: HOSPADM

## 2019-08-16 RX ORDER — ATORVASTATIN CALCIUM 20 MG/1
20 TABLET, FILM COATED ORAL
Status: DISCONTINUED | OUTPATIENT
Start: 2019-08-17 | End: 2019-08-17 | Stop reason: HOSPADM

## 2019-08-16 RX ORDER — ACETAMINOPHEN 500 MG
1000 TABLET ORAL EVERY 6 HOURS
Status: DISCONTINUED | OUTPATIENT
Start: 2019-08-16 | End: 2019-08-17 | Stop reason: HOSPADM

## 2019-08-16 RX ORDER — ACETAMINOPHEN 325 MG/1
650 TABLET ORAL ONCE
Status: DISCONTINUED | OUTPATIENT
Start: 2019-08-16 | End: 2019-08-16 | Stop reason: SDUPTHER

## 2019-08-16 RX ORDER — CEFAZOLIN SODIUM/WATER 2 G/20 ML
2 SYRINGE (ML) INTRAVENOUS ONCE
Status: DISCONTINUED | OUTPATIENT
Start: 2019-08-16 | End: 2019-08-16 | Stop reason: HOSPADM

## 2019-08-16 RX ORDER — SODIUM CHLORIDE, SODIUM LACTATE, POTASSIUM CHLORIDE, CALCIUM CHLORIDE 600; 310; 30; 20 MG/100ML; MG/100ML; MG/100ML; MG/100ML
INJECTION, SOLUTION INTRAVENOUS
Status: DISCONTINUED | OUTPATIENT
Start: 2019-08-16 | End: 2019-08-16 | Stop reason: HOSPADM

## 2019-08-16 RX ORDER — FENTANYL CITRATE 50 UG/ML
25 INJECTION, SOLUTION INTRAMUSCULAR; INTRAVENOUS
Status: DISCONTINUED | OUTPATIENT
Start: 2019-08-16 | End: 2019-08-16 | Stop reason: HOSPADM

## 2019-08-16 RX ORDER — CELECOXIB 200 MG/1
200 CAPSULE ORAL ONCE
Status: COMPLETED | OUTPATIENT
Start: 2019-08-16 | End: 2019-08-16

## 2019-08-16 RX ORDER — LOSARTAN POTASSIUM 50 MG/1
50 TABLET ORAL DAILY
Status: DISCONTINUED | OUTPATIENT
Start: 2019-08-17 | End: 2019-08-17 | Stop reason: HOSPADM

## 2019-08-16 RX ADMIN — MIDAZOLAM HYDROCHLORIDE 2 MG: 1 INJECTION, SOLUTION INTRAMUSCULAR; INTRAVENOUS at 07:21

## 2019-08-16 RX ADMIN — CELECOXIB 200 MG: 200 CAPSULE ORAL at 19:48

## 2019-08-16 RX ADMIN — MIDAZOLAM 2 MG: 1 INJECTION INTRAMUSCULAR; INTRAVENOUS at 07:45

## 2019-08-16 RX ADMIN — DEXAMETHASONE SODIUM PHOSPHATE 4 MG: 4 INJECTION, SOLUTION INTRAMUSCULAR; INTRAVENOUS at 08:08

## 2019-08-16 RX ADMIN — SODIUM CHLORIDE 125 ML/HR: 900 INJECTION, SOLUTION INTRAVENOUS at 12:09

## 2019-08-16 RX ADMIN — PROPOFOL 50 MCG/KG/MIN: 10 INJECTION, EMULSION INTRAVENOUS at 07:50

## 2019-08-16 RX ADMIN — Medication 2 G: at 15:38

## 2019-08-16 RX ADMIN — PROPOFOL: 10 INJECTION, EMULSION INTRAVENOUS at 09:41

## 2019-08-16 RX ADMIN — ACETAMINOPHEN 1000 MG: 500 TABLET ORAL at 19:48

## 2019-08-16 RX ADMIN — SODIUM CHLORIDE, POTASSIUM CHLORIDE, SODIUM LACTATE AND CALCIUM CHLORIDE: 600; 310; 30; 20 INJECTION, SOLUTION INTRAVENOUS at 07:45

## 2019-08-16 RX ADMIN — BUPIVACAINE HYDROCHLORIDE 10 MG: 5 INJECTION, SOLUTION EPIDURAL; INTRACAUDAL; PERINEURAL at 07:56

## 2019-08-16 RX ADMIN — GLYCOPYRROLATE 0.2 MG: 0.2 INJECTION, SOLUTION INTRAMUSCULAR; INTRAVENOUS at 08:31

## 2019-08-16 RX ADMIN — GLYCOPYRROLATE 0.2 MG: 0.2 INJECTION, SOLUTION INTRAMUSCULAR; INTRAVENOUS at 08:00

## 2019-08-16 RX ADMIN — ACETAMINOPHEN 1000 MG: 500 TABLET ORAL at 15:10

## 2019-08-16 RX ADMIN — CELECOXIB 200 MG: 200 CAPSULE ORAL at 06:41

## 2019-08-16 RX ADMIN — Medication 10 MG: at 07:59

## 2019-08-16 RX ADMIN — Medication 10 ML: at 21:57

## 2019-08-16 RX ADMIN — ACETAMINOPHEN 1000 MG: 500 TABLET ORAL at 06:41

## 2019-08-16 RX ADMIN — CEFAZOLIN 2 G: 330 INJECTION, POWDER, FOR SOLUTION INTRAMUSCULAR; INTRAVENOUS at 07:53

## 2019-08-16 RX ADMIN — ONDANSETRON HYDROCHLORIDE 4 MG: 2 INJECTION, SOLUTION INTRAMUSCULAR; INTRAVENOUS at 08:06

## 2019-08-16 RX ADMIN — ASPIRIN 325 MG: 325 TABLET, DELAYED RELEASE ORAL at 21:52

## 2019-08-16 RX ADMIN — GLYCOPYRROLATE 0.2 MG: 0.2 INJECTION, SOLUTION INTRAMUSCULAR; INTRAVENOUS at 09:22

## 2019-08-16 RX ADMIN — ASPIRIN 325 MG: 325 TABLET, DELAYED RELEASE ORAL at 15:10

## 2019-08-16 RX ADMIN — PROPOFOL 20 MG: 10 INJECTION, EMULSION INTRAVENOUS at 07:51

## 2019-08-16 RX ADMIN — SODIUM CHLORIDE, POTASSIUM CHLORIDE, SODIUM LACTATE AND CALCIUM CHLORIDE: 600; 310; 30; 20 INJECTION, SOLUTION INTRAVENOUS at 09:47

## 2019-08-16 RX ADMIN — SENNOSIDES, DOCUSATE SODIUM 1 TABLET: 50; 8.6 TABLET, FILM COATED ORAL at 19:46

## 2019-08-16 RX ADMIN — FENTANYL CITRATE 100 MCG: 50 INJECTION, SOLUTION INTRAMUSCULAR; INTRAVENOUS at 07:21

## 2019-08-16 RX ADMIN — SENNOSIDES, DOCUSATE SODIUM 1 TABLET: 50; 8.6 TABLET, FILM COATED ORAL at 15:10

## 2019-08-16 NOTE — DISCHARGE INSTRUCTIONS
After Hospital Care Plan:  Discharge Instructions Knee Replacement-Dr. Jeff De La Fuente      Patient Name: Annel Estrada  Date of procedure: 8/16/2019   Procedure: Procedure(s):  LEFT TOTAL KNEE ARTHROPLASTY   Surgeon: Veronica He) and Role:     Esteban Weems MD (Jody) - Primary    PCP: Corey Hill MD  Date of discharge: No discharge date for patient encounter. Diet  Regular diet or usual diet as at home. Drink plenty of fluids. Eat foods high in fiber and protein and low in fat. Avoid alcoholic beverages. Avoid smoking. Activities  You are going home a well person, so be as active as possible. Walk with your walker or crutches weight bearing as tolerated-wean as tolerated. Avoid low chairs and slippery surfaces. Avoid twisting your knee. Do not sit longer than 45 minutes at a time. During the daytime, get up every hour and take a brief walk. Exercises  Perform your exercise routine 3 times a day as instructed by the physical therapist.  Gradually increase the repetitions of the exercises. You may place an ice bag on your knee for 15-20 minutes after exercising. You should walk daily, each time lengthening your walking distance as your strength improves. Be sure to work on getting your knee out all the way straight. Do not place a pillow under your knee when resting. Medications  Take Xarelto 10mg - once a day for 12 days. Do not take Aspirin or other Anti-inflammatories while taking Xarelto. Once finished we will have you take Aspirin 325mg (1 tablet a day for 2 more weeks. )    Take a multivitamin with iron once a day for a month. Take a stool softener daily (such as Senokot-S or Colace) to prevent constipation while you are taking iron and pain medication. If constipation occurs, take a laxative (such as Dulcolax tablets, Milk of Magnesia, or suppository). Take pain medication with food. You will find that you will decrease the amount you use as your pain lessens. Oxycodone 5mg tabs 1-2 every 3-4 hours as needed for pain      Incision Care  You will have a waterproof dressing on the knee called Aquacel and it is OK to shower with the dressing in place. The Aquacel dressing will be removed 1 week after surgery. Ok to leave open to the air as long as it is not draining      If you have staples ; they will be removed 2 weeks after surgery by  your home health nurse  If there are steri-strips ; please leave them in place until they fall off. Cover your wound if you are having any drainage. Wear your elastic stockings for 4 weeks. You may remove them for approximately 1 hour when showering       Follow-up Office Visit  Post op appointment is with Delphine Best PA-C ( Dr. Brandon MONAE)in approximately 2 weeks. . Please call the office if you need to change your follow -up appointment ([phone])    Reasons to call the Doctor  1. Increased redness, swelling or drainage from you incision site. 2.  Temperature consistently greater than 101 degrees. 3.  Increased pain or unrelieved pain. 4.  Calf or chest pain      Home Health/Home therapy  Physical Therapy-routine exercises, ROM,  gait training quad strengthening. 3 times in 10 day period   Remove Aquacel Dressing 1 week post op. (leave steri-strips in place iuntil they come off. Out Patient Physical therapy  You should expect to begin outpatient physical therapy approximately 2 -2 1/2 weeks after surgery. This should already be set up prior to your surgery. If it is not already scheduled, please call the office and speak with one of my assistants about setting up outpatient physical therapy. Other instructions  Do not take more than 4 Grams of Tylenol in 24 hours. Many pain medications contain Tylenol. Percocet contains 325mg of Tylenol per tablet; Lortab contains 500mg of Tylenol per tablet, Norco contains 325 mg of Tylenol per tablet.     Tylenol usage:  325 mg tablets DO NOT take more than 12 tablets in 24 hours                            500mg tablets DO NOT take more than 8 tablets in 24 hours

## 2019-08-16 NOTE — PROGRESS NOTES
TRANSFER - IN REPORT:    Verbal report received from Key(name) on Michell Georges  being received from [facility]) for routine post - op      Report consisted of patients Situation, Background, Assessment and   Recommendations(SBAR). Information from the following report(s) SBAR, Kardex, Intake/Output, MAR and Recent Results was reviewed with the receiving nurse. Opportunity for questions and clarification was provided. Assessment completed upon patients arrival to unit and care assumed.

## 2019-08-16 NOTE — ANESTHESIA POSTPROCEDURE EVALUATION
Procedure(s):  LEFT TOTAL KNEE ARTHROPLASTY . spinal    Anesthesia Post Evaluation        Patient location during evaluation: PACU  Patient participation: complete - patient participated  Level of consciousness: awake  Pain management: adequate  Airway patency: patent  Anesthetic complications: no  Cardiovascular status: hemodynamically stable  Respiratory status: acceptable  Hydration status: acceptable  Comments: I have seen and evaluated the patient. The patient is ready for PACU discharge. [address], DO                         Vitals Value Taken Time   /57 8/16/2019 10:25 AM   Temp     Pulse 61 8/16/2019 10:26 AM   Resp 14 8/16/2019 10:26 AM   SpO2 92 % 8/16/2019 10:26 AM   Vitals shown include unvalidated device data.

## 2019-08-16 NOTE — PROGRESS NOTES
Bedside and Verbal shift change report given to Sandra Berger (oncoming nurse) by Ian Ledezma (offgoing nurse). Report included the following information SBAR, Kardex, Intake/Output, MAR and Recent Results.

## 2019-08-16 NOTE — ANESTHESIA PROCEDURE NOTES
Spinal Block    Performed by: Pankaj Au DO  Authorized by: Pankaj Au DO     Pre-procedure:   Indications: at surgeon's request and primary anesthetic  Preanesthetic Checklist: patient identified, risks and benefits discussed, anesthesia consent, site marked, patient being monitored and timeout performed      Spinal Block:   Patient Position:  Seated  Prep Region:  Lumbar  Prep: Betadine and patient draped      Location:  L3-4  Technique:  Single shot        Needle:   Needle Type:  Pencan  Needle Gauge:  24 G  Attempts:  1      Events: CSF confirmed, no blood with aspiration and no paresthesia        Assessment:  Insertion:  Uncomplicated  Patient tolerance:  Patient tolerated the procedure well with no immediate complications

## 2019-08-16 NOTE — H&P
Knee Replacement Medical Necessity    HISTORY  Teresa Monday is a 80y.o. year old male who is suffering from:   severe/end stage osteoarthritis   of His  Left knee with progressive worsening of symptoms and a functional decline over the past 8 Years. Treatment has included:    prescription and OTC NSAIDS which have not effectively relieved pain/inflammation, physical therapy without functional improvement, intra-articular injections, activity modification and assistive devices  cane    His knee pain and stiffness limit His ability to:    perform ADL's, sleep at night and to climb steps        PHYSICAL EXAM  Vital Signs  Degrees of Deformity- Genu Varum 6 with abductor thrust  ROM- 4 Degree of flexion contracture  Crepitus- medial joint line  Effusions-small  Tenderness-medial joint line  Gait-mild antalgic      INVESTIGATIONS  X-ray AP and Lateral Left knee shows:   joint space narrowing  medial, subchondral sclerosis, marginal osteophytes, medial subluxation of femur on tibia and erosion of  medial tibial plateau    IMPRESSION  Following a discussion of Susen Monday x-rays, physical exam findings and history Halimaen Monday is indicated for left total knee arthroplasty.

## 2019-08-16 NOTE — PROGRESS NOTES
Primary Nurse Alka Rowe and Mathew Goodson, RN performed a dual skin assessment on this patient No impairment noted  Willis score is 21

## 2019-08-16 NOTE — ANESTHESIA PROCEDURE NOTES
Peripheral Block    Performed by: Vicente Das DO  Authorized by: Vicente Das DO       Pre-procedure: Indications: at surgeon's request and post-op pain management    Preanesthetic Checklist: patient identified, risks and benefits discussed, site marked, timeout performed, anesthesia consent given and patient being monitored      Block Type:   Block Type:   Adductor canal  Laterality:  Left  Monitoring:  Standard ASA monitoring, responsive to questions, continuous pulse ox, frequent vital sign checks, oxygen and heart rate  Injection Technique:  Single shot  Procedures: ultrasound guided    Patient Position: supine  Prep: chlorhexidine    Location:  Mid thigh  Needle Type:  Stimuplex  Needle Gauge:  22 G  Needle Localization:  Ultrasound guidance    Assessment:  Number of attempts:  1  Injection Assessment:  Incremental injection every 5 mL, no paresthesia, no intravascular symptoms, negative aspiration for blood and local visualized surrounding nerve on ultrasound  Patient tolerance:  Patient tolerated the procedure well with no immediate complications

## 2019-08-16 NOTE — PROGRESS NOTES
Problem: Mobility Impaired (Adult and Pediatric)  Goal: *Acute Goals and Plan of Care (Insert Text)  Description  FUNCTIONAL STATUS PRIOR TO ADMISSION: Patient was independent and active without use of DME.    HOME SUPPORT PRIOR TO ADMISSION: The patient lived with wife but did not require assist.    Physical Therapy Goals  Initiated 8/16/2019    1. Patient will move from supine to sit and sit to supine  in bed with modified independence within 4 days. 2. Patient will perform sit to stand with supervision/set-up within 4 days. 3. Patient will ambulate with supervision/set-up for at least 100 feet with the least restrictive device within 4 days. 4. Patient will ascend/descend 4 stairs with bilat handrail(s) with supervision/set-up within 4 days. 5. Patient will perform home exercise program per protocol with supervision/set-up within 4 days. 6. Patient will demonstrate AROM 0-90 degrees in operative joint within 4 days. Outcome: Not Met   PHYSICAL THERAPY EVALUATION  Patient: Criss Haywood (36 y.o. male)  Date: 8/16/2019  Primary Diagnosis: Arthritis of knee, left [M17.12]  Procedure(s) (LRB):  LEFT TOTAL KNEE ARTHROPLASTY  (Left) Day of Surgery   Precautions: WBAT L LE         ASSESSMENT  Based on the objective data described below, the patient presents with decreased activity tolerance due to orthostatic hypotension, impaired balance, decreased strength/ ROM L knee, decreased indep with mobility s/p L TKA POD#0. Pt eager to mobilize due to urinary urgency. Tolerated brief standing trial in attempt to use urinal and side stepping with RW; activity limited by positive orthostatic check with c/o dizziness this session. RN present during session/ aware of BP issue. Anticipate steady progress toward mobility goals as BP stabilizes. [facility] at [facility]/c.     Current Level of Function Impacting Discharge (mobility/balance): bed mob min A, transfer sit to stand with RW min A, side stepping with RW min A    Functional Outcome Measure: The patient scored 45/100 on the Barthel outcome measure which is indicative of significant dependence on caregiver assist at this time. Other factors to consider for discharge: supportive wife     Patient will benefit from skilled therapy intervention to address the above noted impairments. PLAN :  Recommendations and Planned Interventions: bed mobility training, transfer training, gait training, therapeutic exercises, patient and family training/education and therapeutic activities      Frequency/Duration: Patient will be followed by physical therapy:  twice daily to address goals. Recommendation for discharge: (in order for the patient to meet his/her long term goals)  Physical therapy at least 2 days/week in the home     This discharge recommendation:  Has not yet been discussed the attending provider and/or case management    Equipment recommendations for successful discharge (if) home: none         SUBJECTIVE:   Patient stated I would like to get to the bathroom.     OBJECTIVE DATA SUMMARY:   HISTORY:    Past Medical History:   Diagnosis Date    Abnormal PSA 05/09/2011    Arrhythmia     OCCASIONAL IRREGULAR BEAT    BPH (benign prostatic hyperplasia)     CAD (coronary artery disease)     CALCIUM BUILD-UP NOTED    Cancer ([facility] [address] 04/2019    PROSTATE    Carotid artery disease ([facility] [address] 04/14/2014    ED (erectile dysfunction)     GERD (gastroesophageal reflux disease) 5/21/2012    Hypercholesterolemia     Hypertension     Pancreatic cyst 10/21/2013    Rhinitis 8/23/2010    Sarcopenia 7/15/2013     Past Surgical History:   Procedure Laterality Date    CARDIAC SURG PROCEDURE UNLIST  1995    CARDIAC CATHETERIZATION    ENDOSCOPY, COLON, DIAGNOSTIC  2012    HX CATARACT REMOVAL Bilateral     HX HEMORRHOIDECTOMY      HX HERNIA REPAIR      HX KNEE ARTHROSCOPY      HX TONSILLECTOMY      AGE 12 YEARS       Personal factors and/or comorbidities impacting plan of care: h/o urinary frequency    Home Situation  Rails to Enter: Yes  Hand Rails : Bilateral  Current DME Used/Available at Home: Walker, rolling    EXAMINATION/PRESENTATION/DECISION MAKING:      Skin:  post op wrap in place L LE  Edema: present R lower LE  Range Of Motion:  AROM: Generally decreased, functional(L knee grossly 0-90)                       Strength:    Strength: Generally decreased, functional(decreased L knee due to surgery)                    Tone & Sensation:   Tone: Normal              Sensation: Intact               Coordination:  Coordination: Generally decreased, functional  Vision:      Functional Mobility:  Bed Mobility:     Supine to Sit: Minimum assistance(assist with L LE management)  Sit to Supine: Minimum assistance(light assist with L LE management)     Transfers:  Sit to Stand: Contact guard assistance(cues for hand placement)  Stand to Sit: Contact guard assistance(cues to reach back)                       Balance:   Sitting: Intact  Standing: Impaired; With support  Standing - Static: Good;Constant support  Standing - Dynamic : Fair  Ambulation/Gait Training:  Distance (ft): 3 Feet (ft)(3' to R)  Assistive Device: Gait belt;Walker, rolling  Ambulation - Level of Assistance: Minimal assistance        Gait Abnormalities: Decreased step clearance; Antalgic(for side stepping only)     Left Side Weight Bearing: As tolerated  Base of Support: Widened  Stance: Left decreased  Speed/Vidhya: Slow;Shuffled  Step Length: Left shortened;Right shortened        Therapeutic Exercises:   Reviewed supine there ex: ankle pumps, quad sets, glute sets, heel slides through partial range; pt demo indep with all    Functional Measure:  Barthel Index:    Bathin  Bladder: 5  Bowels: 10  Groomin  Dressin  Feeding: 10  Mobility: 0  Stairs: 0  Toilet Use: 5  Transfer (Bed to Chair and Back): 5  Total: 45/100       Percentage of impairment   0%   1-19%   20-39%   40-59%   60-79%   80-99%   100% Barthel Score 0-100 100 99-80 79-60 59-40 20-39 1-19   0     The Barthel ADL Index: Guidelines  1. The index should be used as a record of what a patient does, not as a record of what a patient could do. 2. The main aim is to establish degree of independence from any help, physical or verbal, however minor and for whatever reason. 3. The need for supervision renders the patient not independent. 4. A patient's performance should be established using the best available evidence. Asking the patient, friends/relatives and nurses are the usual sources, but direct observation and common sense are also important. However direct testing is not needed. 5. Usually the patient's performance over the preceding 24-48 hours is important, but occasionally longer periods will be relevant. 6. Middle categories imply that the patient supplies over 50 per cent of the effort. 7. Use of aids to be independent is allowed. Karely Spring., Barthel, D.W. ([id number]). Functional evaluation: the Barthel Index. [facility] (14)2. Hank  lucas TIFFANY Carbajal, Geeta Leone., Aniceto Bentley., [place], [address] (1999). Measuring the change indisability after inpatient rehabilitation; comparison of the responsiveness of the Barthel Index and Functional [place] Measure. Journal of Neurology, Neurosurgery, and Psychiatry, 66(4), 563-772. Tyler Phelps NJULIAN, PATSY Hair, & Haritha Man, M.A. (2004.) Assessment of post-stroke quality of life in cost-effectiveness studies: The usefulness of the Barthel Index and the EuroQoL-5D.  Quality of Life Research, 15, 346-16           Physical Therapy Evaluation Charge Determination   History Examination Presentation Decision-Making   MEDIUM  Complexity : 1-2 comorbidities / personal factors will impact the outcome/ POC  LOW Complexity : 1-2 Standardized tests and measures addressing body structure, function, activity limitation and / or participation in recreation  LOW Complexity : Stable, uncomplicated  LOW Complexity : FOTO score of       Based on the above components, the patient evaluation is determined to be of the following complexity level: LOW     Pain Rating:  Pt with min c/o pain during session    Activity Tolerance:   Fair  Please refer to the flowsheet for vital signs taken during this treatment. After treatment patient left in no apparent distress:   Supine in bed, Call bell within reach, Caregiver / family present, Side rails x 3 and RN aware     COMMUNICATION/EDUCATION:   The patients plan of care was discussed with: Registered Nurse. Fall prevention education was provided and the patient/caregiver indicated understanding., Patient/family have participated as able in goal setting and plan of care. and Patient/family agree to work toward stated goals and plan of care.     Thank you for this referral.  Isabell Pope, PT   Time Calculation: 25 mins

## 2019-08-16 NOTE — PROGRESS NOTES
Occupational Therapy   Orders received, chart review completed. Note patient POD #0 from LEFT TOTAL KNEE ARTHROPLASTY. Per pathway, OT will follow up on POD #1 for evaluation. Recommend OOB to chair three times a day for meals and self-completion of ADLs as able and medically stable. Thank you.

## 2019-08-16 NOTE — OP NOTES
Total Knee Operative Report      Patient: Carleen Sutton MRN: [id number]  SSN: [id number]    YOB: 1934  Age: 80 y.o. Sex: male      DATE OF SURGERY:  8/16/2019    Indications: This is a 80 yrs male who presents with  left knee DJD. The patient was admitted for surgery as conservative measures have failed. Date of Surgery: 8/16/2019     Preoperative Diagnosis:  LEFT KNEE PRIMARY OSTEOARTHRITIS    Postoperative Diagnosis: LEFT KNEE PRIMARY OSTEOARTHRITIS    Surgeon: Esteban Alejandra MD (Jody)    Assistant: Arabella Avilez PA-C    Assistant: Surgeon(s):  Esteban Mendoza MD (Jody)    Anesthesia: Spinal    Procedure: Procedure(s):  LEFT TOTAL KNEE ARTHROPLASTY       Procedure Details:  After the procedure had been explained to the patient including the risks, benefits and possible complications, Carleen Sutton signed the informed consent. Carleen Sutton was then brought to the operating room and positioned on the operating table in a supine position and was anethestized with anesthesia. A byers catheter was placed preoperatively and IV Ancef 2 g  was administered. A pneumatic tourniquet was placed about the limb and the left leg was prepped and draped in the usual sterile manner. The tourniquet was inflated. An anterior longitudinal incision was accomplished just medial to the tibial tubercle and extending approximal 6 centimeters proximal to the superior pole of the patella. A medial parapatellar capsular incision was performed. The medial capsular flap was elevated around to the insertion of the semimembranous tendon. The patella was everted and the knee flexed and externally rotated. The medial and lateral menisci were excised. The lateral half of the fat pad excised and the patella femoral ligament was released. The anterior cruciate ligament was resected and the posterior cruciate ligament was substituted.  The Knee was flexed to 90 degrees and an intramedullary canal entry hole was drilled just anterior to the PCL insertion on the femur. The intramedullary michelle was inserted and the cutting guide used this to reference for a resection of  10mm off of the distal femur in approx 6 degrees of valgus. Using extramedullary instrumentation, the tibial cut was then accomplished with three degrees of posterior slope. Approxiamately  two mm of bone was removed from the medial side of the tibia. The flexion and extension gaps were assessed. The sizing guide for the femoral component was then placed and a size  9 was chosen. The guide was set in   5 degrees external rotation to the epicondylar axis to create an equal flexion gap. The appropriate cutting blocks were then utilized to perform the anterior,  Posterior, and  Chamfer cuts  with appropriate lateral translation accomplished. The tibia was sized to a  F component. The tibial base plate was pinned into place and stem site prepared. The femoral trial with the box guide was set for the posterior cruciate substituting prosthesis and these cuts were made also. Lug holes were drilled to accommodate the femoral component. A preliminary range of motion was accomplished with the above size trial components. A  11 millimeter polyethylene insert allowed the patient to obtain full extension as well as appropriate flexion. The patient's ligaments were stable in flexion and extension to medial and lateral stressing and the alignment was through the appropriate mechanical axis. The patella was then measured using the calipers and found to be  26mm thick. Using the cutting guide,  9mm were then resected to accommodate the size  35 patella three peg button. The appropriate guide was then used to drill the three pegs. All trial components were removed and the cut surfaces prepared for cementing with irrigation and debridement of the bone interstices. There were no femoral deficiencies. There were no tibial deficiencies.   No augmentation was utilized. Two package of cement were mixed and the permanent components cemented into place. The femoral and tibial components were pressurized in full extension as well as 70 degrees of flexion. The patella component was pressurized using the patella clamp. Excess cement was using a curette. A combination of Exparel, MSO4, Marcaine, Steroid and Saline was then injected into the entirety of the capsule to assist with post-op pain relief. Once the cement was hardened, the patella clamp was removed and the knee was copiously irrigated. Retrialing was done and a size  9 EF tibial polyethylene component  of  11 mm thickness was chosen. Annel Estrada knee was placed through range of motion and noted to be stable as mentioned above with the trail components. The wound was dry, therefore no drain was used. The capsular layer was closed using a #1 ethibond suture and stratafix suture, while subcutaneous layers were closed using 2-0 Dexon interrupted sutures. Finally the skin was closed using Skin staples, which were applied in occlusive fashion and sterile bandage applied. An Iceman cryo pad was applied on the operative leg. The pneumatic tourniquet was deflated. Sponge count and needle counts were correct. Annel Estrada left the operating room and went to the PACU in Stable Condition. Anna Villalobos PA-C was of vital assistance during the performance of the procedure. She was essential for positioning the knee for the various parts of the procedure and assisting with the exposure of the knee, protection of vital structures and the closure of the knee. Tourniquet Time:   Total Tourniquet Time Documented:  Thigh (Left) - 109 minutes  Total: Thigh (Left) - 109 minutes       Implants:   Implant Name Type Inv.  Item Serial No.  Lot No. LRB No. Used Action   CEMENT BNE MV SMARTSET 40GM --  - SNA  CEMENT BNE MV SMARTSET 40GM --  NA [employer] [id number] Left 2 Implanted   FEM CR FLORINA CCR STD SZ 9 LT -- PERSONA - SNA  FEM CR FLORINA CCR STD SZ 9 LT -- PERSONA [employer] [id number] Left 1 Implanted   TIB PRN NP STM 5 DEG SZ FL -- PERSONA - SNA  TIB PRN NP STM 5 DEG SZ FL -- PERSONA [employer] [id number] Left 1 Implanted   INSERT ALL POLY PAT PLY 35MM -- PERSONA - SNA  INSERT ALL POLY PAT PLY 35MM -- PERSONA [employer] [id number] Left 1 Implanted   Persona Vivacit-E Highly Crosslinked Polyethylene Articular Surface MC Left 11 mm Height   NA [employer] [id number] Left 1 Implanted        Femur Size:  9    Tibia Size:  F    Condition: Stable    Findings: DJD    Estimated Blood Loss:     100 cc         Drains: None    Specimens: * No specimens in log *      Complications:  None; patient tolerated the procedure well    Signed By: Julious P. Albina Kawasaki MD (8/16/2019 at 1:00 PM)

## 2019-08-17 ENCOUNTER — HOME HEALTH ADMISSION (OUTPATIENT)
Dept: HOME HEALTH SERVICES | Facility: HOME HEALTH | Age: 84
End: 2019-08-17
Payer: MEDICARE

## 2019-08-17 VITALS
WEIGHT: 168 LBS | BODY MASS INDEX: 24.81 KG/M2 | SYSTOLIC BLOOD PRESSURE: 130 MMHG | HEART RATE: 58 BPM | TEMPERATURE: 97.4 F | OXYGEN SATURATION: 92 % | DIASTOLIC BLOOD PRESSURE: 66 MMHG | RESPIRATION RATE: 16 BRPM

## 2019-08-17 LAB
ANION GAP SERPL CALC-SCNC: 9 MMOL/L (ref 5–15)
BUN SERPL-MCNC: 23 MG/DL (ref 6–20)
BUN/CREAT SERPL: 23 (ref 12–20)
CALCIUM SERPL-MCNC: 9 MG/DL (ref 8.5–10.1)
CHLORIDE SERPL-SCNC: 107 MMOL/L (ref 97–108)
CO2 SERPL-SCNC: 25 MMOL/L (ref 21–32)
CREAT SERPL-MCNC: 0.98 MG/DL (ref 0.7–1.3)
GLUCOSE SERPL-MCNC: 152 MG/DL (ref 65–100)
HGB BLD-MCNC: 10.5 G/DL (ref 12.1–17)
INR PPP: 1.1 (ref 0.9–1.1)
POTASSIUM SERPL-SCNC: 4.2 MMOL/L (ref 3.5–5.1)
PROTHROMBIN TIME: 10.7 SEC (ref 9–11.1)
SODIUM SERPL-SCNC: 141 MMOL/L (ref 136–145)

## 2019-08-17 PROCEDURE — 97535 SELF CARE MNGMENT TRAINING: CPT

## 2019-08-17 PROCEDURE — 85018 HEMOGLOBIN: CPT

## 2019-08-17 PROCEDURE — 74011250636 HC RX REV CODE- 250/636: Performed by: ORTHOPAEDIC SURGERY

## 2019-08-17 PROCEDURE — 74011250637 HC RX REV CODE- 250/637: Performed by: ORTHOPAEDIC SURGERY

## 2019-08-17 PROCEDURE — 77030012893

## 2019-08-17 PROCEDURE — 80048 BASIC METABOLIC PNL TOTAL CA: CPT

## 2019-08-17 PROCEDURE — 85610 PROTHROMBIN TIME: CPT

## 2019-08-17 PROCEDURE — 36415 COLL VENOUS BLD VENIPUNCTURE: CPT

## 2019-08-17 PROCEDURE — 97110 THERAPEUTIC EXERCISES: CPT

## 2019-08-17 PROCEDURE — 97165 OT EVAL LOW COMPLEX 30 MIN: CPT

## 2019-08-17 PROCEDURE — 97116 GAIT TRAINING THERAPY: CPT

## 2019-08-17 RX ADMIN — SENNOSIDES, DOCUSATE SODIUM 1 TABLET: 50; 8.6 TABLET, FILM COATED ORAL at 07:54

## 2019-08-17 RX ADMIN — AMLODIPINE BESYLATE 7.5 MG: 5 TABLET ORAL at 09:03

## 2019-08-17 RX ADMIN — ASPIRIN 325 MG: 325 TABLET, DELAYED RELEASE ORAL at 09:02

## 2019-08-17 RX ADMIN — CELECOXIB 200 MG: 200 CAPSULE ORAL at 09:02

## 2019-08-17 RX ADMIN — ACETAMINOPHEN 1000 MG: 500 TABLET ORAL at 01:33

## 2019-08-17 RX ADMIN — LOSARTAN POTASSIUM 50 MG: 50 TABLET ORAL at 09:03

## 2019-08-17 RX ADMIN — Medication 10 ML: at 07:54

## 2019-08-17 RX ADMIN — Medication 2 G: at 00:30

## 2019-08-17 RX ADMIN — PANTOPRAZOLE SODIUM 40 MG: 40 TABLET, DELAYED RELEASE ORAL at 07:52

## 2019-08-17 RX ADMIN — ACETAMINOPHEN 1000 MG: 500 TABLET ORAL at 07:53

## 2019-08-17 RX ADMIN — POLYETHYLENE GLYCOL 3350 17 G: 17 POWDER, FOR SOLUTION ORAL at 09:03

## 2019-08-17 NOTE — PROGRESS NOTES
Cm received a page from the staff nurse regarding home health referral. Staff nurse informed cm that the patient has used [address] in the past and would like to use them again. Cm sent a referral to [address] Gertrudis Freire [facility]/ [facility] accepted the referral. Cm put the information on the patients After Visit Summary.

## 2019-08-17 NOTE — PROGRESS NOTES
I have reviewed discharge instructions with the patient and spouse. The patient and spouse verbalized understanding.  Patient leaving to go home with wife by car' patient leaving with copy of discharge instructions, 2 prescriptions, ice packs, extra SHARAD hose, personal belongings; patient signed electronically; patient and spouse watched the discharge video; volunteers assisted patient to discharge area

## 2019-08-17 NOTE — PROGRESS NOTES
PHYSICAL THERAPY TREATMENT  Patient: Teresa Monday (17 y.o. male)  Date: 8/17/2019  Diagnosis: Arthritis of knee, left [M17.12] <principal problem not specified>  Procedure(s) (LRB):  LEFT TOTAL KNEE ARTHROPLASTY  (Left) 1 Day Post-Op  Precautions:    Chart, physical therapy assessment, plan of care and goals were reviewed. ASSESSMENT  Based on the objective data described below, decreased LLE AROM/strength and pain secondary to s/p LTKR POD #1. Patient reported no pain at rest, mild-moderate pain with exercises and walking. Patient performed supine<>sit transfer with modified independence today, able to lift LLE in/out of bed with no assistance needed. Patient performed sit<>stand transfers with supervision assist due to verbal cuing only for proper hand placement. L knee AROM assessed at 0 degrees extension and 92 degrees flexion, pain limiting further flexion. Patient ambulated 100' x 2 using FWW with SBA due to verbal cuing to facilitate reciprocal patterning, which patient was able to demonstrate upon cuing. Patient ambulated stairs x 2 with 1 UE and then BUE on 1 rail, SBA for safety and verbal cuing for proper sequencing. PT performed supine leg exercises x 10 reps. Wife present during session. Provided education on mobilizing every 30-45 minutes upon return home. Current Level of Function Impacting Discharge (mobility/balance): none at this time. Patient is cleared from PT for discharge if medically appropriate. Other factors to consider for discharge: none at this time         PLAN :  Patient continues to benefit from skilled intervention to address the above impairments. Continue treatment per established plan of care. to address goals.     Recommendation for discharge: (in order for the patient to meet his/her long term goals)  Physical therapy at least 2 days/week in the home     This discharge recommendation:  Has been made in collaboration with the attending provider and/or case management    Equipment recommendations for successful discharge (if) home: none       SUBJECTIVE:   Patient stated I am hopefully leaving this afternoon.     OBJECTIVE DATA SUMMARY:   Critical Behavior:  Neurologic State: Alert, Appropriate for age  Orientation Level: Appropriate for age, Oriented to place, Oriented to person, Oriented to situation, Oriented to time, Oriented X4  Cognition: Appropriate decision making, Appropriate safety awareness, Appropriate for age attention/concentration, Follows commands       Range of Motion:                    LLE AROM  L Knee Flexion: 92  L Knee Extension: 0       Functional Mobility Training:  Bed Mobility:     Supine to Sit: Modified independent  Sit to Supine: Modified independent           Transfers:  Sit to Stand: Supervision  Stand to Sit: Supervision                             Balance:  Sitting: Intact  Standing: Intact; With support  Standing - Static: Good  Standing - Dynamic : Good;Constant support  Ambulation/Gait Training:  Distance (ft): 200 Feet (ft)  Assistive Device: Gait belt;Walker, rolling  Ambulation - Level of Assistance: Stand-by assistance        Gait Abnormalities: Steppage gait     Left Side Weight Bearing: As tolerated  Base of Support: Narrowed     Speed/Vidhya: Pace decreased (<100 feet/min)  Step Length: Right shortened                    Stairs:      Patient performed 2 sets of stairs, 1 set with 1 UE on 1 rail and 2nd set with BUE on 1 rail. Therapeutic Exercises:     EXERCISE   Sets   Reps   Active Active Assist   Passive Self ROM   Comments   Ankle Pumps 1 10 ?                                        ?                                        ?                                        ?                                           Quad Sets 1 10 ?                                        ?                                        ?                                        ?                                           Hamstring Sets   ? ?                                        ?                                        ?                                           Short Arc Quads   ? ?                                        ?                                        ?                                           Knee Extension Stretch 1    ?                                          ?                                          ?                                          ?                                        Holding for 1 minute   Heel Slides 1 10 ?                                        ?                                        ?                                        ?                                           Long Arc Quads   ? ?                                        ?                                        ?                                           Knee Flexion Stretch 1 10 ?                                        ?                                        ?                                        ?                                           Straight Leg Raises 1 10 ?                                        ?                                        ?                                        ?                                               Pain Rating:  Mild/moderate pain with movement, no pain at rest    Activity Tolerance:   Good  Please refer to the flowsheet for vital signs taken during this treatment.     After treatment patient left in no apparent distress:   Supine in bed    COMMUNICATION/COLLABORATION:   The patients plan of care was discussed with: Registered Nurse    Fernando Sauer, PT   Time Calculation: 30 mins

## 2019-08-17 NOTE — PROGRESS NOTES
Bedside and Verbal shift change report given to Ji Casey RN (oncoming nurse) by Carlin West RN (offgoing nurse). Report included the following information SBAR, Kardex and MAR.

## 2019-08-17 NOTE — PROGRESS NOTES
Problem: Self Care Deficits Care Plan (Adult)  Goal: *Acute Goals and Plan of Care (Insert Text)  Description    FUNCTIONAL STATUS PRIOR TO ADMISSION: Patient was independent and active without use of DME.    HOME SUPPORT: The patient lived alone with wife to provide assistance (but needed no assist of wife). Occupational Therapy Goals  Initiated 8/17/2019  1. Patient will perform grooming standing sink level with modified independence within 7 day(s). 2.  Patient will perform lower body dressing with modified independence within 7 day(s). 3.  Patient will perform toileting with modified independence within 7 day(s). 4.  Patient will perform toilet transfers with modified independence within 7 day(s). Outcome: Progressing Towards Goal  OCCUPATIONAL THERAPY EVALUATION  Patient: Ashely Sam (73 y.o. male)  Date: 8/17/2019  Primary Diagnosis: Arthritis of knee, left [M17.12]  Procedure(s) (LRB):  LEFT TOTAL KNEE ARTHROPLASTY  (Left) 1 Day Post-Op   Precautions:   Fall    ASSESSMENT  Pt was received seated EOB and agreeable to OT. Wife was present and very supportive. Pt with slightly decreased functional reach to feet, impaired dynamic standing balance, LE edema (pre morbid and exacerbated post operatively- pt with bilateral SHARAD hose donned) s/p TKA. Pt very motivated and pleasant during OT. Expect pt to progress very quickly s/p TKA. Current Level of Function Impacting Discharge (ADLs/self-care): LB ADL minimum A    Functional Outcome Measure: The patient scored 75/100 on the Barthel Index outcome measure which is indicative of mild functional impairment. Other factors to consider for discharge: Pt resides with wife able to assist pt during recovery. Patient will benefit from skilled therapy intervention to address the above noted impairments.        PLAN :  Recommendations and Planned Interventions: self care training, functional mobility training, balance training, therapeutic activities, endurance activities, home safety training and family training/education    Frequency/Duration: Patient will be followed by occupational therapy 5 times a week to address goals. Recommendation for discharge: (in order for the patient to meet his/her long term goals)  No skilled occupational therapy/ follow up rehabilitation needs identified at this time. This discharge recommendation:  Has not yet been discussed the attending provider and/or case management    Equipment recommendations for successful discharge (if) home: none; Pt has [address] shoe horn at home. SUBJECTIVE:   Patient stated I want to get back to golf and boating! 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/21/2013    Rhinitis 8/23/2010    Sarcopenia 7/15/2013     Past Surgical History:   Procedure Laterality Date    CARDIAC SURG PROCEDURE UNLIST  1995    CARDIAC CATHETERIZATION    ENDOSCOPY, COLON, DIAGNOSTIC  2012    HX CATARACT REMOVAL Bilateral     HX HEMORRHOIDECTOMY      HX HERNIA REPAIR      HX KNEE ARTHROSCOPY      HX TONSILLECTOMY      AGE 12 YEARS       Expanded or extensive additional review of patient history:     Home Situation  Home Environment: Private residence  # Steps to Enter: 5  Rails to Enter: Yes  Hand Rails : Bilateral  One/Two Story Residence: Two story  # of Interior Steps: 12  Interior Rails: Left  Lift Chair Available: No  Living Alone: No  Support Systems: Spouse/Significant Other/Partner  Patient Expects to be Discharged to[de-identified] Private residence  Current DME Used/Available at Home: Walker, rolling  Tub or Shower Type: Shower    Hand dominance: Right    EXAMINATION OF PERFORMANCE DEFICITS:  Cognitive/Behavioral Status:  Neurologic State: Alert  Orientation Level: Oriented X4  Cognition: Appropriate decision making  Perception: Appears intact  Perseveration: No perseveration noted  Safety/Judgement: Awareness of environment;Good awareness of safety precautions    Skin: Appears intact BUE    Edema: No edema noted BUE    Hearing: Auditory  Auditory Impairment: None    Vision/Perceptual:    Tracking: Able to track stimulus in all quadrants w/o difficulty              Visual Fields: (Appears intact)       Acuity: Within Defined Limits         Range of Motion:    AROM: Within functional limits  PROM: Within functional limits                      Strength:    Strength: Within functional limits                Coordination:  Coordination: Within functional limits  Fine Motor Skills-Upper: Left Intact; Right Intact    Gross Motor Skills-Upper: Left Intact; Right Intact    Tone & Sensation:    Tone: Normal  Sensation: Intact                      Balance:  Sitting: Intact  Standing: Intact  Standing - Static: Good  Standing - Dynamic : Constant support;Good    Functional Mobility and Transfers for ADLs:  Bed Mobility:  Supine to Sit: Modified independent  Sit to Supine: Modified independent  Scooting: Modified independent    Transfers:  Sit to Stand: Stand-by assistance  Stand to Sit: Stand-by assistance  Bed to Chair: Stand-by assistance  Bathroom Mobility: Stand-by assistance  Toilet Transfer : Stand-by assistance    ADL Assessment:  Feeding: Independent    Oral Facial Hygiene/Grooming: Modified Independent(seated)    Bathing: Stand-by assistance    Upper Body Dressing: Independent    Lower Body Dressing: Minimum assistance(Distal LE primary difficulty)    Toileting: Stand by assistance                ADL Intervention and task modifications:                                     Cognitive Retraining  Safety/Judgement: Awareness of environment;Good awareness of safety precautions    Functional Measure:  Barthel Index:    Bathin  Bladder: 5  Bowels: 10  Groomin  Dressin  Feeding: 10  Mobility: 15  Stairs: 5  Toilet Use: 5  Transfer (Bed to Chair and Back): 10  Total: 75/100        Percentage of impairment   0%   1-19%   20-39%   40-59%   60-79%   80-99%   100%   Barthel Score 0-100 100 99-80 79-60 59-40 20-39 1-19   0     The Barthel ADL Index: Guidelines  1. The index should be used as a record of what a patient does, not as a record of what a patient could do. 2. The main aim is to establish degree of independence from any help, physical or verbal, however minor and for whatever reason. 3. The need for supervision renders the patient not independent. 4. A patient's performance should be established using the best available evidence. Asking the patient, friends/relatives and nurses are the usual sources, but direct observation and common sense are also important. However direct testing is not needed. 5. Usually the patient's performance over the preceding 24-48 hours is important, but occasionally longer periods will be relevant. 6. Middle categories imply that the patient supplies over 50 per cent of the effort. 7. Use of aids to be independent is allowed. Nadege Lopes., Barthel, D.W. ([id number]). Functional evaluation: the Barthel Index. [address] [facility] (14)2. [facility], J.J.M.F, Dee Hancock., [facility].[address] (). Measuring the change indisability after inpatient rehabilitation; comparison of the responsiveness of the Barthel Index and Functional [place] Measure. Journal of Neurology, Neurosurgery, and Psychiatry, 66(4), 378-075. Lizzeth Hand, N.J.A, LAZARO Hair.ROMAN, & Epi Savage M.A. (2004.) Assessment of post-stroke quality of life in cost-effectiveness studies: The usefulness of the Barthel Index and the EuroQoL-5D.  Quality of Life Research, 13, 700-45        Occupational Therapy Evaluation Charge Determination   History Examination Decision-Making   LOW Complexity : Brief history review  LOW Complexity : 1-3 performance deficits relating to physical, cognitive , or psychosocial skils that result in activity limitations and / or participation restrictions  LOW Complexity : No comorbidities that affect functional and no verbal or physical assistance needed to complete eval tasks       Based on the above components, the patient evaluation is determined to be of the following complexity level: LOW   Pain Rating:  None reported    Activity Tolerance:   Good  Please refer to the flowsheet for vital signs taken during this treatment. After treatment patient left in no apparent distress:    Seated EOB with wife; watching educational video     COMMUNICATION/EDUCATION:   The patients plan of care was discussed with: Registered Nurse. Home safety education was provided and the patient/caregiver indicated understanding., Patient/family have participated as able in goal setting and plan of care. and Patient/family agree to work toward stated goals and plan of care. This patients plan of care is appropriate for delegation to [facility].     Thank you for this referral.  Linette Ramos  Time Calculation: 15 mins

## 2019-08-18 ENCOUNTER — HOME CARE VISIT (OUTPATIENT)
Dept: SCHEDULING | Facility: HOME HEALTH | Age: 84
End: 2019-08-18
Payer: MEDICARE

## 2019-08-18 VITALS
OXYGEN SATURATION: 96 % | TEMPERATURE: 98.1 F | HEART RATE: 82 BPM | SYSTOLIC BLOOD PRESSURE: 132 MMHG | RESPIRATION RATE: 16 BRPM | DIASTOLIC BLOOD PRESSURE: 70 MMHG

## 2019-08-18 PROCEDURE — G0299 HHS/HOSPICE OF RN EA 15 MIN: HCPCS

## 2019-08-18 PROCEDURE — 400013 HH SOC

## 2019-08-18 PROCEDURE — G0151 HHCP-SERV OF PT,EA 15 MIN: HCPCS

## 2019-08-18 PROCEDURE — 3331090001 HH PPS REVENUE CREDIT

## 2019-08-18 PROCEDURE — 3331090002 HH PPS REVENUE DEBIT

## 2019-08-19 VITALS
SYSTOLIC BLOOD PRESSURE: 112 MMHG | HEART RATE: 72 BPM | RESPIRATION RATE: 18 BRPM | DIASTOLIC BLOOD PRESSURE: 62 MMHG | TEMPERATURE: 98.7 F | OXYGEN SATURATION: 97 %

## 2019-08-19 PROCEDURE — 3331090001 HH PPS REVENUE CREDIT

## 2019-08-19 PROCEDURE — 3331090002 HH PPS REVENUE DEBIT

## 2019-08-20 PROCEDURE — 3331090001 HH PPS REVENUE CREDIT

## 2019-08-20 PROCEDURE — 3331090002 HH PPS REVENUE DEBIT

## 2019-08-21 ENCOUNTER — HOME CARE VISIT (OUTPATIENT)
Dept: SCHEDULING | Facility: HOME HEALTH | Age: 84
End: 2019-08-21
Payer: MEDICARE

## 2019-08-21 VITALS
DIASTOLIC BLOOD PRESSURE: 72 MMHG | HEART RATE: 72 BPM | RESPIRATION RATE: 18 BRPM | SYSTOLIC BLOOD PRESSURE: 120 MMHG | TEMPERATURE: 97.2 F | OXYGEN SATURATION: 98 %

## 2019-08-21 PROCEDURE — 3331090002 HH PPS REVENUE DEBIT

## 2019-08-21 PROCEDURE — 3331090001 HH PPS REVENUE CREDIT

## 2019-08-21 PROCEDURE — G0151 HHCP-SERV OF PT,EA 15 MIN: HCPCS

## 2019-08-22 PROCEDURE — A4452 WATERPROOF TAPE: HCPCS

## 2019-08-22 PROCEDURE — 3331090001 HH PPS REVENUE CREDIT

## 2019-08-22 PROCEDURE — 3331090002 HH PPS REVENUE DEBIT

## 2019-08-22 PROCEDURE — A6252 ABSORPT DRG >16 <=48 W/O BDR: HCPCS

## 2019-08-23 ENCOUNTER — HOME CARE VISIT (OUTPATIENT)
Dept: SCHEDULING | Facility: HOME HEALTH | Age: 84
End: 2019-08-23
Payer: MEDICARE

## 2019-08-23 VITALS
SYSTOLIC BLOOD PRESSURE: 129 MMHG | DIASTOLIC BLOOD PRESSURE: 80 MMHG | OXYGEN SATURATION: 97 % | TEMPERATURE: 98.3 F | RESPIRATION RATE: 17 BRPM | HEART RATE: 71 BPM

## 2019-08-23 PROCEDURE — G0151 HHCP-SERV OF PT,EA 15 MIN: HCPCS

## 2019-08-23 PROCEDURE — 3331090003 HH PPS REVENUE ADJ

## 2019-08-23 PROCEDURE — 3331090002 HH PPS REVENUE DEBIT

## 2019-08-23 PROCEDURE — 3331090001 HH PPS REVENUE CREDIT

## 2019-08-23 NOTE — DISCHARGE SUMMARY
Total Knee Discharge Summary  Patient ID:  Michell Georges  [id number]  67 y.o.  1934    Admit date: 8/16/2019    Discharge date and time: 8/17/2019 12:57 PM     Admitting Physician: Esteban Gomez MD (Jody)     Discharge Physician: Liliana Drummond MD    Admission Diagnoses: Arthritis of knee, left [M17.12]    Discharge Diagnoses: Active Problems:    Arthritis of knee, left (8/16/2019)        Date of Surgery: 8/16/2019     Procedure:  Procedure(s):  LEFT TOTAL KNEE ARTHROPLASTY     Surgeon: Liliana Drummond MD    DVT Prophylaxis: Xarelto     Postoperative transfusions:     none Banked PRBCs     Post Op complications: none    Hemoglobin at discharge:  Lab Results   Component Value Date/Time    HGB 10.5 (L) 08/17/2019 03:57 AM    INR 1.1 08/17/2019 03:57 AM       HOSPITAL COURSE: Michell Georges is a pleasant 80 y.o. long standing patient with advanced osteoarthritis. The patient failed all conservative management. Therefore, Dr. Jonel Cantrell and the patient decided the most appropriate plan of care is to proceed with a total knee arthroplasty, to be performed at [facility]. All preoperative clearance was done prior to surgery. The patient's complete history and physical, laboratory data and physical exam is well documented in hospital chart. Pre operative antiobiotics, Celebrex, transanamic acid administered prior to surgery. Michell Georges was admitted on 8/16/2019  and underwent successful Procedure(s):  LEFT TOTAL KNEE ARTHROPLASTY . There were no complications intraoperatively and the patient was transferred to the orthopaedic floor in stable condition. Physical therapy and occupational therapy initiated their evaluation and treatment and continued to follow the patient until the patient was discharged.  For pain control, the procedure was performend under a spinal block and a femoral block, and intra operative encapsulated cocktail administered intra-articularly into the capsule and soft tissues. Post operative  oxycodone was utilized as well as Celebrex with excellent pain relief and was well tolerated. DVT prophylaxis was initiated twith 325 mg of ASA  for two days and then starting 10mg of Xarelto daily. Compression stockings and bilateral foot pumps were all started post-operatively. The incision site remained clean, dry, and intact. The patient remained neurovascularly intact. Physical Therapy started on the day following surgery and progressed to independent ambulation with the aid of a walker. At the time of discharge on POD #1, the patient was able to ambulate safely, go up and down stairs and had an understanding of the explicit discharge precautions and instructions following surgery. At the time of discharge the wound appears to be healing without any evidence of infection. At the time of discharge, Jerrod Rodgers was able to go up and down stairs and had understanding of precautions needed following surgery. Discharged to: Home     Discharge instructions:   -Anticoagulate with 10 mg Xarelto daily.   -Resume pre hospital diet with normal changes for coumadin.   -Resume home medications per medication reconciliation form. No NSAIDs or aspirin products while on anticoagulation. Prescriptions for oxycodone 5mg for pain medication and 10 mg Xarelto were provided to the patient.   -Ambulate with an assisted device as instructed by PT/OT prior to discharge.   -Patient is to have Home Health services provided post operatively to include skilled nursing and physical therapy. Patient is to work hard on full extension and full flexion throughout the day at home.   -First follow-up appointment to be scheduled in approximately 3 weeks. Patient should call the office to confirm apptointment. -Explicit discharge instructions were provided to the patient.       Signed:  Esteban Rojas MD (Jody)  8/23/2019  9:41 AM

## 2019-11-11 DIAGNOSIS — I25.10 CORONARY ARTERY DISEASE INVOLVING NATIVE CORONARY ARTERY OF NATIVE HEART WITHOUT ANGINA PECTORIS: Primary | ICD-10-CM

## 2019-11-11 DIAGNOSIS — I10 ESSENTIAL HYPERTENSION, BENIGN: ICD-10-CM

## 2019-11-22 LAB
ALBUMIN SERPL-MCNC: 4.2 G/DL (ref 3.5–4.7)
ALBUMIN/GLOB SERPL: 1.7 {RATIO} (ref 1.2–2.2)
ALP SERPL-CCNC: 60 IU/L (ref 39–117)
ALT SERPL-CCNC: 15 IU/L (ref 0–44)
AST SERPL-CCNC: 19 IU/L (ref 0–40)
BASOPHILS # BLD AUTO: 0 X10E3/UL (ref 0–0.2)
BASOPHILS NFR BLD AUTO: 1 %
BILIRUB SERPL-MCNC: 1 MG/DL (ref 0–1.2)
BUN SERPL-MCNC: 16 MG/DL (ref 8–27)
BUN/CREAT SERPL: 22 (ref 10–24)
CALCIUM SERPL-MCNC: 9.2 MG/DL (ref 8.6–10.2)
CHLORIDE SERPL-SCNC: 105 MMOL/L (ref 96–106)
CHOLEST SERPL-MCNC: 170 MG/DL (ref 100–199)
CO2 SERPL-SCNC: 25 MMOL/L (ref 20–29)
CREAT SERPL-MCNC: 0.74 MG/DL (ref 0.76–1.27)
EOSINOPHIL # BLD AUTO: 0.2 X10E3/UL (ref 0–0.4)
EOSINOPHIL NFR BLD AUTO: 3 %
ERYTHROCYTE [DISTWIDTH] IN BLOOD BY AUTOMATED COUNT: 11.9 % (ref 12.3–15.4)
GLOBULIN SER CALC-MCNC: 2.5 G/DL (ref 1.5–4.5)
GLUCOSE SERPL-MCNC: 97 MG/DL (ref 65–99)
HCT VFR BLD AUTO: 37.3 % (ref 37.5–51)
HDLC SERPL-MCNC: 122 MG/DL
HGB BLD-MCNC: 12.7 G/DL (ref 13–17.7)
IMM GRANULOCYTES # BLD AUTO: 0 X10E3/UL (ref 0–0.1)
IMM GRANULOCYTES NFR BLD AUTO: 0 %
INTERPRETATION, 910389: NORMAL
LDLC SERPL CALC-MCNC: 42 MG/DL (ref 0–99)
LYMPHOCYTES # BLD AUTO: 1.6 X10E3/UL (ref 0.7–3.1)
LYMPHOCYTES NFR BLD AUTO: 30 %
MAGNESIUM SERPL-MCNC: 2.2 MG/DL (ref 1.6–2.3)
MCH RBC QN AUTO: 33.3 PG (ref 26.6–33)
MCHC RBC AUTO-ENTMCNC: 34 G/DL (ref 31.5–35.7)
MCV RBC AUTO: 98 FL (ref 79–97)
MONOCYTES # BLD AUTO: 0.7 X10E3/UL (ref 0.1–0.9)
MONOCYTES NFR BLD AUTO: 13 %
NEUTROPHILS # BLD AUTO: 2.9 X10E3/UL (ref 1.4–7)
NEUTROPHILS NFR BLD AUTO: 53 %
PLATELET # BLD AUTO: 166 X10E3/UL (ref 150–450)
POTASSIUM SERPL-SCNC: 4.9 MMOL/L (ref 3.5–5.2)
PROT SERPL-MCNC: 6.7 G/DL (ref 6–8.5)
RBC # BLD AUTO: 3.81 X10E6/UL (ref 4.14–5.8)
SODIUM SERPL-SCNC: 144 MMOL/L (ref 134–144)
TRIGL SERPL-MCNC: 31 MG/DL (ref 0–149)
VLDLC SERPL CALC-MCNC: 6 MG/DL (ref 5–40)
WBC # BLD AUTO: 5.4 X10E3/UL (ref 3.4–10.8)

## 2019-12-02 ENCOUNTER — OFFICE VISIT (OUTPATIENT)
Dept: INTERNAL MEDICINE CLINIC | Age: 84
End: 2019-12-02

## 2019-12-02 VITALS
HEART RATE: 67 BPM | WEIGHT: 172 LBS | RESPIRATION RATE: 16 BRPM | OXYGEN SATURATION: 97 % | HEIGHT: 69 IN | SYSTOLIC BLOOD PRESSURE: 133 MMHG | TEMPERATURE: 96.4 F | DIASTOLIC BLOOD PRESSURE: 70 MMHG | BODY MASS INDEX: 25.48 KG/M2

## 2019-12-02 DIAGNOSIS — C61 PROSTATE CANCER (HCC): Primary | ICD-10-CM

## 2019-12-02 DIAGNOSIS — I10 ESSENTIAL HYPERTENSION, BENIGN: ICD-10-CM

## 2019-12-02 DIAGNOSIS — I25.10 CORONARY ARTERY DISEASE INVOLVING NATIVE CORONARY ARTERY OF NATIVE HEART WITHOUT ANGINA PECTORIS: ICD-10-CM

## 2019-12-02 DIAGNOSIS — E78.00 PURE HYPERCHOLESTEROLEMIA: ICD-10-CM

## 2019-12-02 RX ORDER — LOSARTAN POTASSIUM 50 MG/1
50 TABLET ORAL DAILY
Qty: 90 TAB | Refills: 3 | Status: SHIPPED | OUTPATIENT
Start: 2019-12-02 | End: 2019-12-03 | Stop reason: SDUPTHER

## 2019-12-02 RX ORDER — TAMSULOSIN HYDROCHLORIDE 0.4 MG/1
0.4 CAPSULE ORAL 2 TIMES DAILY
Qty: 180 CAP | Refills: 1 | OUTPATIENT
Start: 2019-12-02

## 2019-12-02 RX ORDER — ATORVASTATIN CALCIUM 20 MG/1
20 TABLET, FILM COATED ORAL DAILY
Qty: 90 TAB | Refills: 3 | Status: SHIPPED | OUTPATIENT
Start: 2019-12-02 | End: 2019-12-03 | Stop reason: SDUPTHER

## 2019-12-02 RX ORDER — ATORVASTATIN CALCIUM 20 MG/1
20 TABLET, FILM COATED ORAL DAILY
Qty: 90 TAB | Refills: 3 | OUTPATIENT
Start: 2019-12-02

## 2019-12-02 RX ORDER — TAMSULOSIN HYDROCHLORIDE 0.4 MG/1
0.4 CAPSULE ORAL 2 TIMES DAILY
Qty: 180 CAP | Refills: 1 | Status: SHIPPED | OUTPATIENT
Start: 2019-12-02 | End: 2019-12-03 | Stop reason: SDUPTHER

## 2019-12-02 RX ORDER — LOSARTAN POTASSIUM 50 MG/1
50 TABLET ORAL DAILY
Qty: 90 TAB | Refills: 3 | OUTPATIENT
Start: 2019-12-02

## 2019-12-02 RX ORDER — AMLODIPINE BESYLATE 10 MG/1
10 TABLET ORAL DAILY
Qty: 90 TAB | Refills: 1 | OUTPATIENT
Start: 2019-12-02

## 2019-12-02 RX ORDER — AMLODIPINE BESYLATE 10 MG/1
10 TABLET ORAL DAILY
Qty: 90 TAB | Refills: 1 | Status: SHIPPED | OUTPATIENT
Start: 2019-12-02 | End: 2019-12-03 | Stop reason: SDUPTHER

## 2019-12-02 RX ORDER — IBUPROFEN 200 MG
TABLET ORAL
COMMUNITY
End: 2021-10-20

## 2019-12-02 NOTE — PROGRESS NOTES
1. Have you been to the ER, urgent care clinic since your last visit? Hospitalized since your last visit?yes. [facility]-for TKR    2. Have you seen or consulted any other health care providers outside of the [address] [place] [address] since your last visit?   Include any pap smears or colon screening. efren chavez, dr Cookie Sheehan surgeon

## 2019-12-02 NOTE — TELEPHONE ENCOUNTER
Patient called to request that the medication that he was given today should go to [facility] mail order Follow-up with your pediatrician within 48 hours of discharge. Continue feeding child at least every 3 hours, wake baby to feed if needed. Please contact your pediatrician and return to the hospital if you notice any of the following:   - Fever  (T > 100.4)  - Reduced amount of wet diapers (< 5-6 per day) or no wet diaper in 12 hours  - Increased fussiness, irritability, or crying inconsolably  - Lethargy (excessively sleepy, difficult to arouse)  - Breathing difficulties (noisy breathing, increased work of breathing)  - Changes in the baby’s color (yellow, blue, pale, gray)  - Seizure or loss of consciousness Hyperbilirubinemia requiring phototherapy, please continue to follow weight and jaundice

## 2019-12-02 NOTE — PROGRESS NOTES
Subjective: This is an 80year old gentleman with long term hypertension, long term dyslipidemia, normal cardiac cath a number of years ago. No cardiac symptoms. His biggest issue is over the winter of 2019 had radiation therapy external beam.  This was [place] in [place]. He is now feeling pretty well. He has had a little bit of urinary frequency. He has had a little bit of bowel irregularity. It has only been 7 month since his last radiation treatment. Is here for the summer. Has a boat. Is active. Exercises. He is a little bit slower than he used to be. He is upset over the slight pudginess that he has developed. Had tkr left 3 month ago  And doing well  Slight bronchial cough no echols    He has had no GI symptoms. He has normal renal function. He has not had any GERD or indigestion, although does take Omeprazole occasionally for some GI upset. He wanted me to refill it. His labs that were done within the last week showed normal lipids, normal renal function and a slight borderline low anemia secondary to recent radiation. Vitals:    12/02/19 0955 12/02/19 1033   BP: 122/59 133/70   Pulse: 67    Resp: 16    Temp: 96.4 °F (35.8 °C)    TempSrc: Oral    SpO2: 97%    Weight: 172 lb (78 kg)    Height: 5' 9\" (1.753 m)      Physical Examination:  His BP was normal.  S1, S2 regular. Lungs clear. Abdomen soft. Neurologically intact. Plan:  I reviewed his labs, reviewed side effects. He can use Meloxicam as needed. I did give him a prescription for Omeprazole to take simultaneously. He will continue to follow lab work every six months. He is doing well in terms of the radiation for his prostate. His PSA last week was 0.0 per the patient. He can discontinue aspirin as he has no stents and the risk/benefit analysis would make that more likely to cause harm than good.     Lab Results   Component Value Date/Time    GFR est AA 97 11/21/2019 08:44 AM GFR est non-AA 84 11/21/2019 08:44 AM    Creatinine (POC) 0.8 07/31/2018 12:53 PM    Creatinine 0.74 (L) 11/21/2019 08:44 AM    BUN 16 11/21/2019 08:44 AM    Sodium 144 11/21/2019 08:44 AM    Potassium 4.9 11/21/2019 08:44 AM    Chloride 105 11/21/2019 08:44 AM    CO2 25 11/21/2019 08:44 AM     Lab Results   Component Value Date/Time    WBC 5.4 11/21/2019 08:44 AM    WBC 5.4 05/15/2012 12:00 AM    HGB 12.7 (L) 11/21/2019 08:44 AM    HCT 37.3 (L) 11/21/2019 08:44 AM    PLATELET 831 12/61/7367 08:44 AM    MCV 98 (H) 11/21/2019 08:44 AM     Lab Results   Component Value Date/Time    Cholesterol, total 170 11/21/2019 08:44 AM    HDL Cholesterol 122 11/21/2019 08:44 AM    LDL, calculated 42 11/21/2019 08:44 AM    VLDL, calculated 6 11/21/2019 08:44 AM    Triglyceride 31 11/21/2019 08:44 AM    CHOL/HDL Ratio 1.4 06/30/2010 08:37 AM       1. Essential hypertension, benign  Control good  - amLODIPine (NORVASC) 10 mg tablet; Take 1 Tab by mouth daily. Dispense: 90 Tab; Refill: 1  - losartan (COZAAR) 50 mg tablet; Take 1 Tab by mouth daily. Dispense: 90 Tab; Refill: 3    2. Prostate cancer ([address]  On lupron for 2 years    3. Coronary artery disease involving native coronary artery of native heart without angina pectoris  No symptoms    4. Pure hypercholesterolemia  ldl < 70 at goal    Requested Prescriptions     Signed Prescriptions Disp Refills    tamsulosin (FLOMAX) 0.4 mg capsule 180 Cap 1     Sig: Take 1 Cap by mouth two (2) times a day.  atorvastatin (LIPITOR) 20 mg tablet 90 Tab 3     Sig: Take 1 Tab by mouth daily.  amLODIPine (NORVASC) 10 mg tablet 90 Tab 1     Sig: Take 1 Tab by mouth daily.  losartan (COZAAR) 50 mg tablet 90 Tab 3     Sig: Take 1 Tab by mouth daily.

## 2019-12-03 ENCOUNTER — TELEPHONE (OUTPATIENT)
Dept: INTERNAL MEDICINE CLINIC | Age: 84
End: 2019-12-03

## 2019-12-03 RX ORDER — LOSARTAN POTASSIUM 50 MG/1
50 TABLET ORAL DAILY
Qty: 90 TAB | Refills: 3 | Status: SHIPPED | OUTPATIENT
Start: 2019-12-03 | End: 2020-11-16 | Stop reason: SDUPTHER

## 2019-12-03 RX ORDER — ATORVASTATIN CALCIUM 20 MG/1
20 TABLET, FILM COATED ORAL DAILY
Qty: 90 TAB | Refills: 3 | Status: SHIPPED | OUTPATIENT
Start: 2019-12-03 | End: 2020-11-16 | Stop reason: SDUPTHER

## 2019-12-03 RX ORDER — AMLODIPINE BESYLATE 10 MG/1
10 TABLET ORAL DAILY
Qty: 90 TAB | Refills: 1 | Status: SHIPPED | OUTPATIENT
Start: 2019-12-03 | End: 2020-07-15

## 2019-12-03 RX ORDER — TAMSULOSIN HYDROCHLORIDE 0.4 MG/1
0.4 CAPSULE ORAL 2 TIMES DAILY
Qty: 180 CAP | Refills: 1 | Status: SHIPPED | OUTPATIENT
Start: 2019-12-03 | End: 2020-11-16 | Stop reason: SDUPTHER

## 2020-04-13 ENCOUNTER — VIRTUAL VISIT (OUTPATIENT)
Dept: INTERNAL MEDICINE CLINIC | Age: 85
End: 2020-04-13

## 2020-04-13 VITALS
SYSTOLIC BLOOD PRESSURE: 145 MMHG | TEMPERATURE: 97 F | BODY MASS INDEX: 25.48 KG/M2 | WEIGHT: 172 LBS | HEIGHT: 69 IN | HEART RATE: 60 BPM | DIASTOLIC BLOOD PRESSURE: 70 MMHG

## 2020-04-13 DIAGNOSIS — R60.9 EDEMA, UNSPECIFIED TYPE: ICD-10-CM

## 2020-04-13 DIAGNOSIS — J01.90 SUBACUTE SINUSITIS, UNSPECIFIED LOCATION: Primary | ICD-10-CM

## 2020-04-13 DIAGNOSIS — I10 ESSENTIAL HYPERTENSION, BENIGN: ICD-10-CM

## 2020-04-13 RX ORDER — AZITHROMYCIN 250 MG/1
250 TABLET, FILM COATED ORAL SEE ADMIN INSTRUCTIONS
Qty: 6 TAB | Refills: 0 | Status: SHIPPED | OUTPATIENT
Start: 2020-04-13 | End: 2020-04-18

## 2020-04-13 RX ORDER — HYDROCHLOROTHIAZIDE 25 MG/1
25 TABLET ORAL DAILY
Qty: 30 TAB | Refills: 5 | Status: SHIPPED | OUTPATIENT
Start: 2020-04-13 | End: 2020-07-31 | Stop reason: SDUPTHER

## 2020-04-13 RX ORDER — GUAIFENESIN 100 MG/5ML
81 LIQUID (ML) ORAL DAILY
COMMUNITY
End: 2021-10-20 | Stop reason: ALTCHOICE

## 2020-04-13 NOTE — PROGRESS NOTES
Subjective:    THIS IS A VIRTUAL VISIT USING DOXY. ME SOFTWARE    Olive Salter is a 80 y.o. male who presents for evaluation of possible sinus infection. Symptoms include facial pain, nasal congestion and non productive cough with no fever, chills, or night sweats. Onset of symptoms was 4 weeks ago, unchanged since that time. He is drinking plenty of fluids. .  Past history is significant for occasional episodes of bronchitis. Patient is a non-smoker. Past Medical History:   Diagnosis Date    Abnormal PSA 05/09/2011    Arrhythmia     OCCASIONAL IRREGULAR BEAT    BPH (benign prostatic hyperplasia)     CAD (coronary artery disease)     CALCIUM BUILD-UP NOTED    Cancer ([facility] [address] 04/2019    PROSTATE    Carotid artery disease ([facility] [address] 04/14/2014    ED (erectile dysfunction)     GERD (gastroesophageal reflux disease) 5/21/2012    Hypercholesterolemia     Hypertension     Pancreatic cyst 10/21/2013    Rhinitis 8/23/2010    Sarcopenia 7/15/2013     Family History   Problem Relation Age of Onset    Alzheimer Mother     Heart Attack Father         MI     Current Outpatient Medications   Medication Sig Dispense Refill    aspirin 81 mg chewable tablet Take 81 mg by mouth daily.  amLODIPine (NORVASC) 10 mg tablet Take 1 Tab by mouth daily. 90 Tab 1    atorvastatin (LIPITOR) 20 mg tablet Take 1 Tab by mouth daily. 90 Tab 3    tamsulosin (FLOMAX) 0.4 mg capsule Take 1 Cap by mouth two (2) times a day. 180 Cap 1    losartan (COZAAR) 50 mg tablet Take 1 Tab by mouth daily. 90 Tab 3    omeprazole (PRILOSEC) 20 mg capsule TAKE 1 CAPSULE EVERY DAY AS NEEDED FOR  GASTROESOPHAGEAL REFLUX DISEASE 90 Cap 3    glucosamine sulfate (GLUCOSAMINE PO) Take  by mouth.  LUTEIN PO Take  by mouth.  ibuprofen (ADVIL) 200 mg tablet Take  by mouth.  melatonin 1 mg tablet Take 2 mg by mouth nightly as needed (sleep).  patient will not take melatonin if taking oxycodone      multivitamin (ONE A DAY) tablet Take 1 Tab by mouth daily.  vitamin E (AQUA GEMS) 400 unit capsule Take 400 Units by mouth daily.  cholecalciferol, VITAMIN D3, (VITAMIN D3) 5,000 unit tab tablet Take 5,000 Units by mouth daily.  leuprolide acetate (LUPRON DEPOT, 6 MONTH, IM) by IntraMUSCular route. RECEIVED LAST DOSE:2019      fluticasone (FLONASE) 50 mcg/actuation nasal spray USE 2 SPRAYS IN BOTH NOSTRILS  DAILY. (Patient taking differently: daily as needed.) 48 g 1     Allergies   Allergen Reactions    Zantac [Ranitidine Hcl] Rash     Social History     Socioeconomic History    Marital status:      Spouse name: Not on file    Number of children: Not on file    Years of education: Not on file    Highest education level: Not on file   Occupational History    Not on file   Social Needs    Financial resource strain: Not on file    Food insecurity     Worry: Not on file     Inability: Not on file    Transportation needs     Medical: Not on file     Non-medical: Not on file   Tobacco Use    Smoking status: Former Smoker     Packs/day: 1.00     Last attempt to quit: 1965     Years since quittin.6    Smokeless tobacco: Never Used   Substance and Sexual Activity    Alcohol use:  Yes     Alcohol/week: 11.7 standard drinks     Types: 14 Shots of liquor per week    Drug use: No    Sexual activity: Yes     Partners: Female   Lifestyle    Physical activity     Days per week: Not on file     Minutes per session: Not on file    Stress: Not on file   Relationships    Social connections     Talks on phone: Not on file     Gets together: Not on file     Attends [employer]ism service: Not on file     Active member of club or organization: Not on file     Attends meetings of clubs or organizations: Not on file     Relationship status: Not on file    Intimate partner violence     Fear of current or ex partner: Not on file     Emotionally abused: Not on file     Physically abused: Not on file     Forced sexual activity: Not on file Other Topics Concern    Not on file   Social History Narrative    Not on file     Review of Systems  Cardiovascular: positive for lower extremity edema  Denies echols or pnd or cough  Objective:     Visit Vitals  /70   Pulse 60   Temp 97 °F (36.1 °C) (Oral)   Ht 5' 9\" (1.753 m)   Wt 172 lb (78 kg)   BMI 25.40 kg/m²     General:  alert, cooperative, no distress, appears stated age   Head:  NCAT w/o lesions or tenderness   Eyes:    Ears:    Sinus tender:    Mouth:     Neck:    Lungs:       Legs some chronic brawny edema  Both legs  Assessment:     Acute bacterial sinusitis    Plan:     1. flonase  2. Zithromax  3. Nasal saline rinses as needed for congestion. 4. Follow-up with PCP in 3 weeks or return if symptoms worsen or persist.        1. Essential hypertension, benign  Fair control  - CBC WITH AUTOMATED DIFF; Future  - LIPID PANEL; Future  - METABOLIC PANEL, COMPREHENSIVE; Future  - MAGNESIUM; Future    2. Edema, unspecified type  Likely from htn meds and inactivity  Does not like stockings    3. Subacute sinusitis, unspecified location  recurrent  - azithromycin (Zithromax Z-Zoltan) 250 mg tablet; Take 1 Tab by mouth See Admin Instructions for 5 days. Dispense: 6 Tab;  Refill: 0  Prolonged visit with 15 minutes of time out  of more than a  25 minute visit spent counseling patient and family and formulation of care  Labs and follow up on his meds will be needed

## 2020-04-16 RX ORDER — FLUTICASONE PROPIONATE 50 MCG
2 SPRAY, SUSPENSION (ML) NASAL DAILY
Qty: 1 BOTTLE | Refills: 0 | Status: SHIPPED | OUTPATIENT
Start: 2020-04-16 | End: 2020-09-08

## 2020-04-16 RX ORDER — FLUTICASONE PROPIONATE 50 MCG
SPRAY, SUSPENSION (ML) NASAL
Qty: 3 BOTTLE | Refills: 1 | Status: SHIPPED | OUTPATIENT
Start: 2020-04-16 | End: 2022-02-07

## 2020-04-16 RX ORDER — FLUTICASONE PROPIONATE 50 MCG
SPRAY, SUSPENSION (ML) NASAL
Qty: 48 G | Refills: 1 | Status: CANCELLED | OUTPATIENT
Start: 2020-04-16

## 2020-04-16 NOTE — TELEPHONE ENCOUNTER
90 day supply    Patient is requesting that (1) bottle of the Fluticasone be sent to the [facility] for  today. Please contact patient today he states he has a question.

## 2020-04-16 NOTE — TELEPHONE ENCOUNTER
Spoke with pt and he requests plaquenil to be prescribed as preventative.   Will forward to provider

## 2020-04-28 ENCOUNTER — TELEPHONE (OUTPATIENT)
Dept: INTERNAL MEDICINE CLINIC | Age: 85
End: 2020-04-28

## 2020-04-28 DIAGNOSIS — I10 ESSENTIAL HYPERTENSION, BENIGN: ICD-10-CM

## 2020-04-28 DIAGNOSIS — I25.10 CORONARY ARTERY DISEASE INVOLVING NATIVE CORONARY ARTERY OF NATIVE HEART WITHOUT ANGINA PECTORIS: ICD-10-CM

## 2020-04-28 DIAGNOSIS — I10 ESSENTIAL HYPERTENSION, BENIGN: Primary | ICD-10-CM

## 2020-04-28 NOTE — TELEPHONE ENCOUNTER
Patient is requesting lab orders change to [facility] . Please fax order to the [facility]  # [phone]  And also fax a copy to patient [phone].  Please call patient once it is completed

## 2020-05-01 LAB
ALBUMIN SERPL-MCNC: 4.6 G/DL (ref 3.6–4.6)
ALBUMIN/GLOB SERPL: 2 {RATIO} (ref 1.2–2.2)
ALP SERPL-CCNC: 62 IU/L (ref 39–117)
ALT SERPL-CCNC: 20 IU/L (ref 0–44)
AST SERPL-CCNC: 27 IU/L (ref 0–40)
BASOPHILS # BLD AUTO: 0 X10E3/UL (ref 0–0.2)
BASOPHILS NFR BLD AUTO: 1 %
BILIRUB SERPL-MCNC: 0.7 MG/DL (ref 0–1.2)
BUN SERPL-MCNC: 22 MG/DL (ref 8–27)
BUN/CREAT SERPL: 21 (ref 10–24)
CALCIUM SERPL-MCNC: 9.7 MG/DL (ref 8.6–10.2)
CHLORIDE SERPL-SCNC: 100 MMOL/L (ref 96–106)
CHOLEST SERPL-MCNC: 197 MG/DL (ref 100–199)
CO2 SERPL-SCNC: 26 MMOL/L (ref 20–29)
CREAT SERPL-MCNC: 1.03 MG/DL (ref 0.76–1.27)
EOSINOPHIL # BLD AUTO: 0.3 X10E3/UL (ref 0–0.4)
EOSINOPHIL NFR BLD AUTO: 6 %
ERYTHROCYTE [DISTWIDTH] IN BLOOD BY AUTOMATED COUNT: 12.8 % (ref 11.6–15.4)
GLOBULIN SER CALC-MCNC: 2.3 G/DL (ref 1.5–4.5)
GLUCOSE SERPL-MCNC: 100 MG/DL (ref 65–99)
HCT VFR BLD AUTO: 37.2 % (ref 37.5–51)
HDLC SERPL-MCNC: 129 MG/DL
HGB BLD-MCNC: 12.9 G/DL (ref 13–17.7)
IMM GRANULOCYTES # BLD AUTO: 0 X10E3/UL (ref 0–0.1)
IMM GRANULOCYTES NFR BLD AUTO: 0 %
INTERPRETATION, 910389: NORMAL
LDLC SERPL CALC-MCNC: 55 MG/DL (ref 0–99)
LYMPHOCYTES # BLD AUTO: 1.9 X10E3/UL (ref 0.7–3.1)
LYMPHOCYTES NFR BLD AUTO: 44 %
MAGNESIUM SERPL-MCNC: 2.2 MG/DL (ref 1.6–2.3)
MCH RBC QN AUTO: 34.3 PG (ref 26.6–33)
MCHC RBC AUTO-ENTMCNC: 34.7 G/DL (ref 31.5–35.7)
MCV RBC AUTO: 99 FL (ref 79–97)
MONOCYTES # BLD AUTO: 0.6 X10E3/UL (ref 0.1–0.9)
MONOCYTES NFR BLD AUTO: 14 %
NEUTROPHILS # BLD AUTO: 1.6 X10E3/UL (ref 1.4–7)
NEUTROPHILS NFR BLD AUTO: 35 %
PLATELET # BLD AUTO: 144 X10E3/UL (ref 150–450)
POTASSIUM SERPL-SCNC: 4.5 MMOL/L (ref 3.5–5.2)
PROT SERPL-MCNC: 6.9 G/DL (ref 6–8.5)
RBC # BLD AUTO: 3.76 X10E6/UL (ref 4.14–5.8)
SODIUM SERPL-SCNC: 143 MMOL/L (ref 134–144)
TRIGL SERPL-MCNC: 64 MG/DL (ref 0–149)
VLDLC SERPL CALC-MCNC: 13 MG/DL (ref 5–40)
WBC # BLD AUTO: 4.4 X10E3/UL (ref 3.4–10.8)

## 2020-06-15 ENCOUNTER — OFFICE VISIT (OUTPATIENT)
Dept: INTERNAL MEDICINE CLINIC | Age: 85
End: 2020-06-15

## 2020-06-15 VITALS
TEMPERATURE: 97 F | OXYGEN SATURATION: 97 % | WEIGHT: 170 LBS | HEIGHT: 69 IN | HEART RATE: 60 BPM | BODY MASS INDEX: 25.18 KG/M2 | DIASTOLIC BLOOD PRESSURE: 79 MMHG | SYSTOLIC BLOOD PRESSURE: 139 MMHG | RESPIRATION RATE: 20 BRPM

## 2020-06-15 DIAGNOSIS — I77.9 CAROTID ARTERY DISEASE, UNSPECIFIED LATERALITY, UNSPECIFIED TYPE (HCC): ICD-10-CM

## 2020-06-15 DIAGNOSIS — R14.0 GASSINESS: ICD-10-CM

## 2020-06-15 DIAGNOSIS — I10 ESSENTIAL HYPERTENSION, BENIGN: ICD-10-CM

## 2020-06-15 DIAGNOSIS — C61 PROSTATE CANCER (HCC): ICD-10-CM

## 2020-06-15 DIAGNOSIS — R00.2 PALPITATION: Primary | ICD-10-CM

## 2020-06-15 RX ORDER — MIRABEGRON 50 MG/1
TABLET, FILM COATED, EXTENDED RELEASE ORAL
COMMUNITY
Start: 2020-05-15 | End: 2020-12-14 | Stop reason: ALTCHOICE

## 2020-06-15 NOTE — PROGRESS NOTES
Subjective: This is an 80year old gentleman with long term hypertension, long term dyslipidemia, normal cardiac cath a number of years ago. No cardiac symptoms. His biggest issue is over the winter of 2019 had radiation therapy external beam.  This was done in [place] in [place] the winter. He is now feeling pretty well. He has had a little bit of urinary frequency. He has had a little bit of bowel irregularity. It has only been 7 month since his last radiation treatment. Is here for the summer. Has a boat. Is active. Exercises. He is a little bit slower than he used to be. He is upset over the slight pudginess that he has developed. Subjective:  He has noted that his heart monitor that he carries around with him occasionally shows that his pulse is irregular. He has never had documented a-fib. EKG today shows sinus bradycardia with a NM interval of 0.23, so he has first degree AV block. He generally has been doing well. He is now happy that he is starting to exercise again. He had not been exercising as much over the last couple months related to the COVID epidemic. He had questions about some gassy distention that he gets in the lower belly at times. He was treated with two to three months of radiation in the winter of 2018. He does not get blood in his stool, but he has noticed that his bowels are less regular than they used to be and occasionally he is more gassy than he used to be. Appetite is good. No central abdominal pain, no back pain. He has generally been doing okay. He wonders if he needs to have any other testing. Slight bronchial cough no echols    He has had no GI symptoms. He has normal renal function. He has not had any GERD or indigestion, although does take Omeprazole occasionally for some GI upset. He wanted me to refill it.   His labs that were done within the last week showed normal lipids, normal renal function and a slight borderline low anemia secondary to recent radiation. Vitals:    06/15/20 1527   BP: 139/79   Pulse: 60   Resp: 20   Temp: 97 °F (36.1 °C)   TempSrc: Oral   SpO2: 97%   Weight: 170 lb (77.1 kg)   Height: 5' 9\" (1.753 m)     Physical Examination:  His BP was normal.  S1, S2 regular. Lungs clear. Abdomen soft. Neurologically intact. Legs chroniv brawny edema  ekg bradycardia  Plan:  I reviewed his labs, reviewed side effects. He will continue to follow lab work every six months. He is doing well in terms of the radiation for his prostate. His PSA last week was 0.0 per the patient. Lab Results   Component Value Date/Time    GFR est AA 76 04/30/2020 08:11 AM    GFR est non-AA 66 04/30/2020 08:11 AM    Creatinine (POC) 0.8 07/31/2018 12:53 PM    Creatinine 1.03 04/30/2020 08:11 AM    BUN 22 04/30/2020 08:11 AM    Sodium 143 04/30/2020 08:11 AM    Potassium 4.5 04/30/2020 08:11 AM    Chloride 100 04/30/2020 08:11 AM    CO2 26 04/30/2020 08:11 AM     Lab Results   Component Value Date/Time    WBC 4.4 04/30/2020 08:11 AM    WBC 5.4 05/15/2012 12:00 AM    HGB 12.9 (L) 04/30/2020 08:11 AM    HCT 37.2 (L) 04/30/2020 08:11 AM    PLATELET 762 (L) 10/59/3050 08:11 AM    MCV 99 (H) 04/30/2020 08:11 AM     Lab Results   Component Value Date/Time    Cholesterol, total 197 04/30/2020 08:11 AM    HDL Cholesterol 129 04/30/2020 08:11 AM    LDL, calculated 55 04/30/2020 08:11 AM    VLDL, calculated 13 04/30/2020 08:11 AM    Triglyceride 64 04/30/2020 08:11 AM    CHOL/HDL Ratio 1.4 06/30/2010 08:37 AM     EKG: sinus bradycardia, 1st degree AV block. Requested Prescriptions      No prescriptions requested or ordered in this encounter       1. Palpitation  No syncope and not sure if phone monitor works, can see cardiology   For more monitoring   - AMB POC EKG ROUTINE W/ 12 LEADS, INTER & REP    2. Essential hypertension, benign  controlled    3. Carotid artery disease, unspecified laterality, unspecified type ([address]  follow    4. Prostate cancer ([facility] [address]  2 years lupron up in sept 5.  Gassiness  Add benefiber suspect radiation as a cause     Wt Readings from Last 3 Encounters:   06/15/20 170 lb (77.1 kg)   04/13/20 172 lb (78 kg)   12/02/19 172 lb (78 kg)

## 2020-07-15 DIAGNOSIS — I10 ESSENTIAL HYPERTENSION, BENIGN: ICD-10-CM

## 2020-07-15 RX ORDER — AMLODIPINE BESYLATE 10 MG/1
TABLET ORAL
Qty: 90 TAB | Refills: 1 | Status: SHIPPED | OUTPATIENT
Start: 2020-07-15 | End: 2020-11-16 | Stop reason: SDUPTHER

## 2020-07-31 RX ORDER — HYDROCHLOROTHIAZIDE 25 MG/1
25 TABLET ORAL DAILY
Qty: 30 TAB | Refills: 5 | Status: SHIPPED | OUTPATIENT
Start: 2020-07-31 | End: 2020-08-13 | Stop reason: SDUPTHER

## 2020-08-06 ENCOUNTER — DOCUMENTATION ONLY (OUTPATIENT)
Dept: CARDIOLOGY CLINIC | Age: 85
End: 2020-08-06

## 2020-08-06 ENCOUNTER — TELEPHONE (OUTPATIENT)
Dept: CARDIOLOGY CLINIC | Age: 85
End: 2020-08-06

## 2020-08-06 DIAGNOSIS — I25.10 CORONARY ARTERY DISEASE INVOLVING NATIVE CORONARY ARTERY OF NATIVE HEART WITHOUT ANGINA PECTORIS: ICD-10-CM

## 2020-08-06 DIAGNOSIS — I25.10 CORONARY ARTERY DISEASE INVOLVING NATIVE CORONARY ARTERY OF NATIVE HEART WITHOUT ANGINA PECTORIS: Primary | ICD-10-CM

## 2020-08-06 NOTE — TELEPHONE ENCOUNTER
Called pt two patient identifiers confirmed. Notified pt about Dr. Blake Garay recommendations for 30 day loop monitor. Pt verbalized understanding of information discussed w/ no further questions at this time.

## 2020-08-06 NOTE — PROGRESS NOTES
He sent me iwatch strip for AFIB  It looks like sinus rhythm with PAC  Dr Amara Fish and I recommend 30 day monitor to be sent to his house

## 2020-08-13 RX ORDER — HYDROCHLOROTHIAZIDE 25 MG/1
25 TABLET ORAL DAILY
Qty: 90 TAB | Refills: 3 | Status: SHIPPED | OUTPATIENT
Start: 2020-08-13 | End: 2020-10-26

## 2020-09-22 ENCOUNTER — DOCUMENTATION ONLY (OUTPATIENT)
Dept: CARDIOLOGY CLINIC | Age: 85
End: 2020-09-22

## 2020-09-23 NOTE — PROGRESS NOTES
Verified patient with two types of identifiers. Notified patient of results. Patient states that he has been feeling fine just a few palpitations every now and then. Notified patient that MD recommends follow up for further routine cardiac testing. Patient states he used to get routine echos and they were all fine. Patient states he last had one about 2 years ago. Patient states he would like to follow up with Dr. Walter Dalton in October and review results. If patient and Dr. Walter Dalton recommend further cardiac follow up at that time he will call to schedule an appointment. Patient verbalized understanding and will call with any other questions.

## 2020-10-26 RX ORDER — HYDROCHLOROTHIAZIDE 25 MG/1
TABLET ORAL
Qty: 30 TAB | Refills: 5 | Status: SHIPPED | OUTPATIENT
Start: 2020-10-26 | End: 2020-11-16 | Stop reason: SDUPTHER

## 2020-11-12 ENCOUNTER — TELEPHONE (OUTPATIENT)
Dept: INTERNAL MEDICINE CLINIC | Age: 85
End: 2020-11-12

## 2020-11-12 DIAGNOSIS — I10 ESSENTIAL HYPERTENSION, BENIGN: ICD-10-CM

## 2020-11-12 DIAGNOSIS — I25.10 CORONARY ARTERY DISEASE INVOLVING NATIVE CORONARY ARTERY OF NATIVE HEART WITHOUT ANGINA PECTORIS: Primary | ICD-10-CM

## 2020-11-12 DIAGNOSIS — I25.10 CORONARY ARTERY DISEASE INVOLVING NATIVE CORONARY ARTERY OF NATIVE HEART WITHOUT ANGINA PECTORIS: ICD-10-CM

## 2020-11-12 NOTE — TELEPHONE ENCOUNTER
Message has been conveyed to patient per Dr. Vilma Kong. Patient has verbalized understanding and no further questions were asked.

## 2020-11-12 NOTE — TELEPHONE ENCOUNTER
Patient would like to have lab core orders sent to him in the mail so he can get his lab work done prior to his visit with the doctor on 12/14/20 .       [facility]

## 2020-11-16 ENCOUNTER — PATIENT MESSAGE (OUTPATIENT)
Dept: INTERNAL MEDICINE CLINIC | Age: 85
End: 2020-11-16

## 2020-11-16 DIAGNOSIS — I10 ESSENTIAL HYPERTENSION, BENIGN: ICD-10-CM

## 2020-11-16 RX ORDER — AMLODIPINE BESYLATE 10 MG/1
TABLET ORAL
Qty: 90 TAB | Refills: 1 | Status: SHIPPED | OUTPATIENT
Start: 2020-11-16 | End: 2021-10-20

## 2020-11-16 RX ORDER — ATORVASTATIN CALCIUM 20 MG/1
20 TABLET, FILM COATED ORAL DAILY
Qty: 90 TAB | Refills: 3 | Status: SHIPPED | OUTPATIENT
Start: 2020-11-16 | End: 2021-05-10 | Stop reason: SDUPTHER

## 2020-11-16 RX ORDER — LOSARTAN POTASSIUM 50 MG/1
50 TABLET ORAL DAILY
Qty: 90 TAB | Refills: 3 | Status: SHIPPED | OUTPATIENT
Start: 2020-11-16 | End: 2021-12-21

## 2020-11-16 RX ORDER — TAMSULOSIN HYDROCHLORIDE 0.4 MG/1
0.4 CAPSULE ORAL 2 TIMES DAILY
Qty: 180 CAP | Refills: 1 | Status: SHIPPED | OUTPATIENT
Start: 2020-11-16 | End: 2022-02-07

## 2020-11-16 RX ORDER — HYDROCHLOROTHIAZIDE 25 MG/1
TABLET ORAL
Qty: 30 TAB | Refills: 5 | Status: SHIPPED | OUTPATIENT
Start: 2020-11-16 | End: 2022-02-07

## 2020-11-17 NOTE — TELEPHONE ENCOUNTER
From: Inés Fairchild  To: Zabrina Thornton MD  Sent: 11/16/2020 12:05 PM EST  Subject: Prescription Question    Please send these 90 day refills to [facility]  Thank you, Laroy Lesches 1934

## 2020-11-21 LAB
ALBUMIN SERPL-MCNC: 4.5 G/DL (ref 3.6–4.6)
ALBUMIN/GLOB SERPL: 1.7 {RATIO} (ref 1.2–2.2)
ALP SERPL-CCNC: 57 IU/L (ref 39–117)
ALT SERPL-CCNC: 18 IU/L (ref 0–44)
AST SERPL-CCNC: 24 IU/L (ref 0–40)
BASOPHILS # BLD AUTO: 0.1 X10E3/UL (ref 0–0.2)
BASOPHILS NFR BLD AUTO: 1 %
BILIRUB SERPL-MCNC: 1.2 MG/DL (ref 0–1.2)
BUN SERPL-MCNC: 23 MG/DL (ref 8–27)
BUN/CREAT SERPL: 21 (ref 10–24)
CALCIUM SERPL-MCNC: 9.4 MG/DL (ref 8.6–10.2)
CHLORIDE SERPL-SCNC: 105 MMOL/L (ref 96–106)
CHOLEST SERPL-MCNC: 177 MG/DL (ref 100–199)
CO2 SERPL-SCNC: 25 MMOL/L (ref 20–29)
CREAT SERPL-MCNC: 1.11 MG/DL (ref 0.76–1.27)
EOSINOPHIL # BLD AUTO: 0.2 X10E3/UL (ref 0–0.4)
EOSINOPHIL NFR BLD AUTO: 5 %
ERYTHROCYTE [DISTWIDTH] IN BLOOD BY AUTOMATED COUNT: 12 % (ref 11.6–15.4)
GLOBULIN SER CALC-MCNC: 2.7 G/DL (ref 1.5–4.5)
GLUCOSE SERPL-MCNC: 105 MG/DL (ref 65–99)
HCT VFR BLD AUTO: 38.6 % (ref 37.5–51)
HDLC SERPL-MCNC: 130 MG/DL
HGB BLD-MCNC: 12.6 G/DL (ref 13–17.7)
IMM GRANULOCYTES # BLD AUTO: 0 X10E3/UL (ref 0–0.1)
IMM GRANULOCYTES NFR BLD AUTO: 0 %
INTERPRETATION, 910389: NORMAL
LDLC SERPL CALC-MCNC: 39 MG/DL (ref 0–99)
LYMPHOCYTES # BLD AUTO: 1.9 X10E3/UL (ref 0.7–3.1)
LYMPHOCYTES NFR BLD AUTO: 40 %
MCH RBC QN AUTO: 33.1 PG (ref 26.6–33)
MCHC RBC AUTO-ENTMCNC: 32.6 G/DL (ref 31.5–35.7)
MCV RBC AUTO: 101 FL (ref 79–97)
MONOCYTES # BLD AUTO: 0.7 X10E3/UL (ref 0.1–0.9)
MONOCYTES NFR BLD AUTO: 14 %
NEUTROPHILS # BLD AUTO: 1.9 X10E3/UL (ref 1.4–7)
NEUTROPHILS NFR BLD AUTO: 40 %
PLATELET # BLD AUTO: 160 X10E3/UL (ref 150–450)
POTASSIUM SERPL-SCNC: 4.7 MMOL/L (ref 3.5–5.2)
PROT SERPL-MCNC: 7.2 G/DL (ref 6–8.5)
RBC # BLD AUTO: 3.81 X10E6/UL (ref 4.14–5.8)
SODIUM SERPL-SCNC: 144 MMOL/L (ref 134–144)
TRIGL SERPL-MCNC: 35 MG/DL (ref 0–149)
VLDLC SERPL CALC-MCNC: 8 MG/DL (ref 5–40)
WBC # BLD AUTO: 4.7 X10E3/UL (ref 3.4–10.8)

## 2020-12-03 RX ORDER — OMEPRAZOLE 20 MG/1
CAPSULE, DELAYED RELEASE ORAL
Qty: 90 CAP | Refills: 3 | Status: SHIPPED | OUTPATIENT
Start: 2020-12-03 | End: 2021-12-21

## 2020-12-14 ENCOUNTER — OFFICE VISIT (OUTPATIENT)
Dept: INTERNAL MEDICINE CLINIC | Age: 85
End: 2020-12-14
Payer: MEDICARE

## 2020-12-14 VITALS
TEMPERATURE: 97.3 F | DIASTOLIC BLOOD PRESSURE: 60 MMHG | BODY MASS INDEX: 24.94 KG/M2 | WEIGHT: 168.4 LBS | HEART RATE: 64 BPM | HEIGHT: 69 IN | SYSTOLIC BLOOD PRESSURE: 128 MMHG | RESPIRATION RATE: 16 BRPM | OXYGEN SATURATION: 97 %

## 2020-12-14 DIAGNOSIS — K31.9 NSAID-ASSOCIATED GASTROPATHY: ICD-10-CM

## 2020-12-14 DIAGNOSIS — Z00.00 MEDICARE ANNUAL WELLNESS VISIT, SUBSEQUENT: ICD-10-CM

## 2020-12-14 DIAGNOSIS — I25.10 CORONARY ARTERY DISEASE INVOLVING NATIVE CORONARY ARTERY OF NATIVE HEART WITHOUT ANGINA PECTORIS: ICD-10-CM

## 2020-12-14 DIAGNOSIS — T39.395A NSAID-ASSOCIATED GASTROPATHY: ICD-10-CM

## 2020-12-14 DIAGNOSIS — I10 ESSENTIAL HYPERTENSION, BENIGN: Primary | ICD-10-CM

## 2020-12-14 DIAGNOSIS — Z13.39 SCREENING FOR ALCOHOLISM: ICD-10-CM

## 2020-12-14 DIAGNOSIS — K21.9 GASTROESOPHAGEAL REFLUX DISEASE WITHOUT ESOPHAGITIS: ICD-10-CM

## 2020-12-14 DIAGNOSIS — E78.00 PURE HYPERCHOLESTEROLEMIA: ICD-10-CM

## 2020-12-14 DIAGNOSIS — C61 PROSTATE CANCER (HCC): ICD-10-CM

## 2020-12-14 PROCEDURE — G8420 CALC BMI NORM PARAMETERS: HCPCS | Performed by: INTERNAL MEDICINE

## 2020-12-14 PROCEDURE — G0439 PPPS, SUBSEQ VISIT: HCPCS | Performed by: INTERNAL MEDICINE

## 2020-12-14 PROCEDURE — G8427 DOCREV CUR MEDS BY ELIG CLIN: HCPCS | Performed by: INTERNAL MEDICINE

## 2020-12-14 PROCEDURE — G8510 SCR DEP NEG, NO PLAN REQD: HCPCS | Performed by: INTERNAL MEDICINE

## 2020-12-14 PROCEDURE — 1101F PT FALLS ASSESS-DOCD LE1/YR: CPT | Performed by: INTERNAL MEDICINE

## 2020-12-14 PROCEDURE — G8536 NO DOC ELDER MAL SCRN: HCPCS | Performed by: INTERNAL MEDICINE

## 2020-12-14 PROCEDURE — 99214 OFFICE O/P EST MOD 30 MIN: CPT | Performed by: INTERNAL MEDICINE

## 2020-12-14 NOTE — PATIENT INSTRUCTIONS
Medicare Wellness Visit, Male The best way to live healthy is to have a lifestyle where you eat a well-balanced diet, exercise regularly, limit alcohol use, and quit all forms of tobacco/nicotine, if applicable. Regular preventive services are another way to keep healthy. Preventive services (vaccines, screening tests, monitoring & exams) can help personalize your care plan, which helps you manage your own care. Screening tests can find health problems at the earliest stages, when they are easiest to treat. [facility] follows the current, evidence-based guidelines published by the [facility] Jono Serjio ([facility]) when recommending preventive services for our patients. Because we follow these guidelines, sometimes recommendations change over time as research supports it. (For example, a prostate screening blood test is no longer routinely recommended for men with no symptoms). Of course, you and your doctor may decide to screen more often for some diseases, based on your risk and co-morbidities (chronic disease you are already diagnosed with). Preventive services for you include: - Medicare offers their members a free annual wellness visit, which is time for you and your primary care provider to discuss and plan for your preventive service needs. Take advantage of this benefit every year! 
-All adults over age 72 should receive the recommended pneumonia vaccines. Current USPSTF guidelines recommend a series of two vaccines for the best pneumonia protection.  
-All adults should have a flu vaccine yearly and tetanus vaccine every 10 years. 
-All adults age 48 and older should receive the shingles vaccines (series of two vaccines).       
-All adults age 38-68 who are overweight should have a diabetes screening test once every three years.  
-Other screening tests & preventive services for persons with diabetes include: an eye exam to screen for diabetic retinopathy, a kidney function test, a foot exam, and stricter control over your cholesterol.  
-Cardiovascular screening for adults with routine risk involves an electrocardiogram (ECG) at intervals determined by the provider.  
-Colorectal cancer screening should be done for adults age 54-65 with no increased risk factors for colorectal cancer. There are a number of acceptable methods of screening for this type of cancer. Each test has its own benefits and drawbacks. Discuss with your provider what is most appropriate for you during your annual wellness visit. The different tests include: colonoscopy (considered the best screening method), a fecal occult blood test, a fecal DNA test, and sigmoidoscopy. 
-All adults born between [facility] should be screened once for Hepatitis C. 
-An Abdominal Aortic Aneurysm (AAA) Screening is recommended for men age 73-68 who has ever smoked in their lifetime. Here is a list of your current Health Maintenance items (your personalized list of preventive services) with a due date: 
Health Maintenance Due Topic Date Due  
 Annual Well Visit  05/06/2020

## 2020-12-14 NOTE — PROGRESS NOTES
This is the Subsequent Medicare Annual Wellness Exam, performed 12 months or more after the Initial AWV or the last Subsequent AWV    I have reviewed the patient's medical history in detail and updated the computerized patient record. Depression Risk Factor Screening:     3 most recent PHQ Screens 12/14/2020   Little interest or pleasure in doing things Not at all   Feeling down, depressed, irritable, or hopeless Not at all   Total Score PHQ 2 0       Alcohol Risk Screen   Do you average more than 1 drink per night or more than 7 drinks a week: Yes  5 OZ per day    In the past three months have you have had more than 4 drinks containing alcohol on one occasion: No        Functional Ability and Level of Safety:   Hearing: The patient wears hearing aids. The patient needs further evaluation. Activities of Daily Living: The home contains: no safety equipment. Patient does total self care     Ambulation: with no difficulty     Fall Risk:  Fall Risk Assessment, last 12 mths 12/14/2020   Able to walk? Yes   Fall in past 12 months? No     Abuse Screen:  Patient is not abused       Cognitive Screening   Has your family/caregiver stated any concerns about your memory: no     Cognitive Screening: Normal - Verbal Fluency Test    Assessment/Plan   Education and counseling provided:  Are appropriate based on today's review and evaluation    Diagnoses and all orders for this visit:    1. Medicare annual wellness visit, subsequent    2.  Screening for alcoholism        Health Maintenance Due     Health Maintenance Due   Topic Date Due    Medicare Yearly Exam  05/06/2020       Patient Care Team   Patient Care Team:  Emanuel Power MD as PCP - General  Emanuel Power MD as PCP - [facility] Empaneled Provider  Gutierrez Murray MD (Cardiology)    History     Patient Active Problem List   Diagnosis Code    Essential hypertension, benign I10    CAD (coronary artery disease) I25.10    Rhinitis J31.0    ED (erectile dysfunction) N52.9    Abnormal PSA R97.20    HLD (hyperlipidemia) E78.5    BPH (benign prostatic hyperplasia) N40.0    GERD (gastroesophageal reflux disease) K21.9    Sarcopenia M62.84    Pancreatic cyst K86.2    Carotid artery disease (HCC) I77.9    Coronary artery disease involving native coronary artery without angina pectoris I25.10    Nodule of left lung R91.1    Prostate cancer (HCC) C61    Arthritis of knee, left M17.12     Past Medical History:   Diagnosis Date    Abnormal PSA 05/09/2011    Arrhythmia     OCCASIONAL IRREGULAR BEAT    BPH (benign prostatic hyperplasia)     CAD (coronary artery disease)     CALCIUM BUILD-UP NOTED    Cancer ([facility] [address] 04/2019    PROSTATE    Carotid artery disease ([facility] 04/14/2014    ED (erectile dysfunction)     GERD (gastroesophageal reflux disease) 5/21/2012    Hypercholesterolemia     Hypertension     Pancreatic cyst 10/21/2013    Rhinitis 8/23/2010    Sarcopenia 7/15/2013      Past Surgical History:   Procedure Laterality Date    CARDIAC SURG PROCEDURE UNLIST  1995    CARDIAC CATHETERIZATION    ENDOSCOPY, COLON, DIAGNOSTIC  2012    HX CATARACT REMOVAL Bilateral     HX HEMORRHOIDECTOMY      HX HERNIA REPAIR      HX KNEE ARTHROSCOPY      HX KNEE REPLACEMENT  08/2019    TKR    HX MOHS PROCEDURES      HX TONSILLECTOMY      AGE 12 YEARS     Current Outpatient Medications   Medication Sig Dispense Refill    tamsulosin (FLOMAX) 0.4 mg capsule Take 1 Cap by mouth two (2) times a day. 180 Cap 1    losartan (COZAAR) 50 mg tablet Take 1 Tab by mouth daily. 90 Tab 3    hydroCHLOROthiazide (HYDRODIURIL) 25 mg tablet TAKE 1 TABLET BY MOUTH ONCE DAILY 30 Tab 5    atorvastatin (LIPITOR) 20 mg tablet Take 1 Tab by mouth daily. 90 Tab 3    amLODIPine (NORVASC) 10 mg tablet TAKE 1 TABLET EVERY DAY 90 Tab 1    aspirin 81 mg chewable tablet Take 81 mg by mouth daily.  glucosamine sulfate (GLUCOSAMINE PO) Take  by mouth.       ibuprofen (ADVIL) 200 mg tablet Take  by mouth.  melatonin 1 mg tablet Take 2 mg by mouth nightly as needed (sleep). patient will not take melatonin if taking oxycodone      multivitamin (ONE A DAY) tablet Take 1 Tab by mouth daily.  cholecalciferol, VITAMIN D3, (VITAMIN D3) 5,000 unit tab tablet Take 5,000 Units by mouth daily.  omeprazole (PRILOSEC) 20 mg capsule TAKE 1 CAPSULE EVERY DAY AS NEEDED FOR  GASTROESOPHAGEAL REFLUX DISEASE 90 Cap 3    fluticasone propionate (FLONASE) 50 mcg/actuation nasal spray USE 2 SPRAYS IN EACH NOSTRIL EVERY DAY 48 g 5    fluticasone propionate (FLONASE) 50 mcg/actuation nasal spray USE 2 SPRAYS IN BOTH NOSTRILS  DAILY. 3 Bottle 1    LUTEIN PO Take  by mouth. Allergies   Allergen Reactions    Zantac [Ranitidine Hcl] Rash       Family History   Problem Relation Age of Onset    Alzheimer Mother     Heart Attack Father         MI     Social History     Tobacco Use    Smoking status: Former Smoker     Packs/day: 1.00     Last attempt to quit: 1965     Years since quittin.3    Smokeless tobacco: Never Used   Substance Use Topics    Alcohol use: Yes     Alcohol/week: 11.7 standard drinks     Types: 14 Shots of liquor per week   Subjective:  80year old white male who spends four months of the year in [place], leaving for [place] in two weeks. He is a former smoker, quit in . He has hypertension, hyperlipidemia and risk for coronary disease. Alcohol use is probably in excess. He complains of gassy belly distention at times. He notes that he has midabdominal discomfort at times with a lot of gas. He notes he has urinary frequency at times. He has nocturia. He had a trial of Myrbetriq that did no good. He was tried on most of the other overactive bladder meds and they did no good. Last PSA was 0.0. On his last visit with urology in the spring they discontinued Lupron and he had completed radiation therapy about 20 months ago.   He did not go back in for his follow up visit with urology in the fall. He is leaving for [place] in two weeks and will see them in the spring. He does have some nocturia. He does have frequency. Does have urgency. Has no blood, no pain, no dysuria, no cloudy urine. He drinks a lot of fluid. He drinks some alcohol. He has had his lab work done already. He wanted to review it. Lipids are good. He has slight macrocytosis and has chronic mild anemia that he has had ever since the diagnosis of metastatic prostate cancer. Physical Examination:  BP was normal, cardiac rhythm regular, lungs clear. He is fairly thin. His belly is non tender. There is no abdominal mass. Plan:  High risk meds. I recommended discontinuing NSAID. He is taking a fairly high dose of daily NSAID that was originally given to him by orthopedics. I told him that is a dangerous drug. He has had a slight bump in creatinine on lab work. I told him it would cause epigastric pain and reflux, told him it may cause his belly to have gassy distention. In reference to the gas, I told him to discontinue the NSAID, use Tylenol Arthritis for pain. I recommended that he cut back a little bit on the green vegetables because I told him gas comes from vegetables and that is dietary. Told him to continue exercise and make no other big changes.     Vitals:    12/14/20 1412   BP: 128/60   Pulse: 64   Resp: 16   Temp: 97.3 °F (36.3 °C)   SpO2: 97%   Weight: 168 lb 6.4 oz (76.4 kg)   Height: 5' 9\" (1.753 m)     Lab Results   Component Value Date/Time    Cholesterol, total 177 11/20/2020 08:53 AM    HDL Cholesterol 130 11/20/2020 08:53 AM    LDL, calculated 55 04/30/2020 08:11 AM    LDL Chol Calc (NIH) 39 11/20/2020 08:53 AM    VLDL, calculated 13 04/30/2020 08:11 AM    VLDL Cholesterol Conrad 8 11/20/2020 08:53 AM    Triglyceride 35 11/20/2020 08:53 AM    CHOL/HDL Ratio 1.4 06/30/2010 08:37 AM     Lab Results   Component Value Date/Time    GFR est AA 69 11/20/2020 08:53 AM GFR est non-AA 60 11/20/2020 08:53 AM    Creatinine (POC) 0.8 07/31/2018 12:53 PM    Creatinine 1.11 11/20/2020 08:53 AM    BUN 23 11/20/2020 08:53 AM    Sodium 144 11/20/2020 08:53 AM    Potassium 4.7 11/20/2020 08:53 AM    Chloride 105 11/20/2020 08:53 AM    CO2 25 11/20/2020 08:53 AM     Lab Results   Component Value Date/Time    WBC 4.7 11/20/2020 08:53 AM    WBC 5.4 05/15/2012 12:00 AM    HGB 12.6 (L) 11/20/2020 08:53 AM    HCT 38.6 11/20/2020 08:53 AM    PLATELET 371 30/81/8410 08:53 AM     (H) 11/20/2020 08:53 AM     1. Medicare annual wellness visit, subsequent  Coordinate care    2. Screening for alcoholism  Too much reviewed    3. Essential hypertension, benign  controlled    4. Coronary artery disease involving native coronary artery of native heart without angina pectoris  inactive    5. Prostate cancer ([facility] [address]  reviewed    6. Pure hypercholesterolemia  Labs good    7. Gastroesophageal reflux disease without esophagitis  nexium prn    8.  NSAID-associated gastropathy  Stop all nsaids

## 2021-04-06 ENCOUNTER — TELEPHONE (OUTPATIENT)
Dept: INTERNAL MEDICINE CLINIC | Age: 86
End: 2021-04-06

## 2021-04-06 DIAGNOSIS — R73.03 PREDIABETES: ICD-10-CM

## 2021-04-06 DIAGNOSIS — I10 ESSENTIAL HYPERTENSION, BENIGN: Primary | ICD-10-CM

## 2021-04-06 DIAGNOSIS — E78.00 PURE HYPERCHOLESTEROLEMIA: ICD-10-CM

## 2021-04-06 NOTE — TELEPHONE ENCOUNTER
Needs lab order either mailed or sent to [facility] for appt 5/10       Callback required yes/no and why: Yes, to confirm       Best contact number(s): [phone]     myranda

## 2021-04-07 NOTE — TELEPHONE ENCOUNTER
Message conveyed to patient per Dr. Jeffy Burch. Verbalized understanding no further questions were asked.

## 2021-05-06 LAB
ALBUMIN SERPL-MCNC: 4.3 G/DL (ref 3.6–4.6)
ALBUMIN/GLOB SERPL: 1.7 {RATIO} (ref 1.2–2.2)
ALP SERPL-CCNC: 63 IU/L (ref 39–117)
ALT SERPL-CCNC: 20 IU/L (ref 0–44)
AST SERPL-CCNC: 30 IU/L (ref 0–40)
BASOPHILS # BLD AUTO: 0.1 X10E3/UL (ref 0–0.2)
BASOPHILS NFR BLD AUTO: 1 %
BILIRUB SERPL-MCNC: 0.8 MG/DL (ref 0–1.2)
BUN SERPL-MCNC: 24 MG/DL (ref 8–27)
BUN/CREAT SERPL: 24 (ref 10–24)
CALCIUM SERPL-MCNC: 9.4 MG/DL (ref 8.6–10.2)
CHLORIDE SERPL-SCNC: 106 MMOL/L (ref 96–106)
CHOLEST SERPL-MCNC: 181 MG/DL (ref 100–199)
CO2 SERPL-SCNC: 25 MMOL/L (ref 20–29)
CREAT SERPL-MCNC: 0.98 MG/DL (ref 0.76–1.27)
EOSINOPHIL # BLD AUTO: 0.1 X10E3/UL (ref 0–0.4)
EOSINOPHIL NFR BLD AUTO: 3 %
ERYTHROCYTE [DISTWIDTH] IN BLOOD BY AUTOMATED COUNT: 12.4 % (ref 11.6–15.4)
EST. AVERAGE GLUCOSE BLD GHB EST-MCNC: 111 MG/DL
GLOBULIN SER CALC-MCNC: 2.5 G/DL (ref 1.5–4.5)
GLUCOSE SERPL-MCNC: 106 MG/DL (ref 65–99)
HBA1C MFR BLD: 5.5 % (ref 4.8–5.6)
HCT VFR BLD AUTO: 37.5 % (ref 37.5–51)
HDLC SERPL-MCNC: 126 MG/DL
HGB BLD-MCNC: 12.6 G/DL (ref 13–17.7)
IMM GRANULOCYTES # BLD AUTO: 0 X10E3/UL (ref 0–0.1)
IMM GRANULOCYTES NFR BLD AUTO: 0 %
IMP & REVIEW OF LAB RESULTS: NORMAL
LDLC SERPL CALC-MCNC: 46 MG/DL (ref 0–99)
LYMPHOCYTES # BLD AUTO: 1.6 X10E3/UL (ref 0.7–3.1)
LYMPHOCYTES NFR BLD AUTO: 35 %
MCH RBC QN AUTO: 33.1 PG (ref 26.6–33)
MCHC RBC AUTO-ENTMCNC: 33.6 G/DL (ref 31.5–35.7)
MCV RBC AUTO: 98 FL (ref 79–97)
MONOCYTES # BLD AUTO: 0.6 X10E3/UL (ref 0.1–0.9)
MONOCYTES NFR BLD AUTO: 14 %
NEUTROPHILS # BLD AUTO: 2.2 X10E3/UL (ref 1.4–7)
NEUTROPHILS NFR BLD AUTO: 47 %
PLATELET # BLD AUTO: 163 X10E3/UL (ref 150–450)
POTASSIUM SERPL-SCNC: 5.1 MMOL/L (ref 3.5–5.2)
PROT SERPL-MCNC: 6.8 G/DL (ref 6–8.5)
RBC # BLD AUTO: 3.81 X10E6/UL (ref 4.14–5.8)
SODIUM SERPL-SCNC: 144 MMOL/L (ref 134–144)
TRIGL SERPL-MCNC: 39 MG/DL (ref 0–149)
VLDLC SERPL CALC-MCNC: 9 MG/DL (ref 5–40)
WBC # BLD AUTO: 4.6 X10E3/UL (ref 3.4–10.8)

## 2021-05-10 ENCOUNTER — OFFICE VISIT (OUTPATIENT)
Dept: INTERNAL MEDICINE CLINIC | Age: 86
End: 2021-05-10
Payer: MEDICARE

## 2021-05-10 VITALS
DIASTOLIC BLOOD PRESSURE: 64 MMHG | BODY MASS INDEX: 25.92 KG/M2 | HEIGHT: 69 IN | RESPIRATION RATE: 16 BRPM | TEMPERATURE: 97.3 F | WEIGHT: 175 LBS | SYSTOLIC BLOOD PRESSURE: 127 MMHG | HEART RATE: 59 BPM | OXYGEN SATURATION: 97 %

## 2021-05-10 DIAGNOSIS — K31.9 NSAID-ASSOCIATED GASTROPATHY: ICD-10-CM

## 2021-05-10 DIAGNOSIS — I25.10 CORONARY ARTERY DISEASE INVOLVING NATIVE CORONARY ARTERY OF NATIVE HEART WITHOUT ANGINA PECTORIS: ICD-10-CM

## 2021-05-10 DIAGNOSIS — T39.395A NSAID-ASSOCIATED GASTROPATHY: ICD-10-CM

## 2021-05-10 DIAGNOSIS — I77.9 CAROTID ARTERY DISEASE, UNSPECIFIED LATERALITY, UNSPECIFIED TYPE (HCC): ICD-10-CM

## 2021-05-10 DIAGNOSIS — R60.9 EDEMA, UNSPECIFIED TYPE: ICD-10-CM

## 2021-05-10 DIAGNOSIS — I10 ESSENTIAL HYPERTENSION, BENIGN: ICD-10-CM

## 2021-05-10 DIAGNOSIS — E78.00 PURE HYPERCHOLESTEROLEMIA: ICD-10-CM

## 2021-05-10 DIAGNOSIS — M53.86 SCIATICA OF RIGHT SIDE ASSOCIATED WITH DISORDER OF LUMBAR SPINE: Primary | ICD-10-CM

## 2021-05-10 DIAGNOSIS — C61 PROSTATE CANCER (HCC): ICD-10-CM

## 2021-05-10 PROCEDURE — G8419 CALC BMI OUT NRM PARAM NOF/U: HCPCS | Performed by: INTERNAL MEDICINE

## 2021-05-10 PROCEDURE — 99214 OFFICE O/P EST MOD 30 MIN: CPT | Performed by: INTERNAL MEDICINE

## 2021-05-10 PROCEDURE — G8536 NO DOC ELDER MAL SCRN: HCPCS | Performed by: INTERNAL MEDICINE

## 2021-05-10 PROCEDURE — G8510 SCR DEP NEG, NO PLAN REQD: HCPCS | Performed by: INTERNAL MEDICINE

## 2021-05-10 PROCEDURE — 1101F PT FALLS ASSESS-DOCD LE1/YR: CPT | Performed by: INTERNAL MEDICINE

## 2021-05-10 PROCEDURE — G8427 DOCREV CUR MEDS BY ELIG CLIN: HCPCS | Performed by: INTERNAL MEDICINE

## 2021-05-10 RX ORDER — ATORVASTATIN CALCIUM 20 MG/1
20 TABLET, FILM COATED ORAL DAILY
Qty: 90 TAB | Refills: 3 | Status: SHIPPED | OUTPATIENT
Start: 2021-05-10 | End: 2022-08-05

## 2021-05-10 NOTE — PROGRESS NOTES
Patient has been informed of any other discussion outside the parameters of the physical could result in other charges including a co pay, patient verbalized understanding.     Chief Complaint   Patient presents with   [facility] Wellness Visit

## 2021-05-10 NOTE — PROGRESS NOTES
Subjective: This is an 80year old gentleman with long term hypertension, long term dyslipidemia, normal cardiac cath a number of years ago. No cardiac symptoms. His biggest issue is over the winter of 2019 had radiation therapy external beam.  This was done in [place] because he lives in [place] for the winter. He is now feeling pretty well. He has had a little bit of urinary frequency. He has had a little bit of bowel irregularity. It has only been26 month since his last radiation treatment. Is here for the summer. Has a boat. Is active. Exercises. He is a little bit slower than he used to be. He is upset over the slight pudginess that he has developed. Subjective:  He has noted that his heart monitor that he carries around with him occasionally shows that his pulse is irregular. He has never had documented a-fib. e generally has been doing well. He is now happy that he is starting to exercise again. He had not been exercising as much over the last couple months related to the COVID epidemic. He had questions about some gassy distention that he gets in the lower belly at times. He was treated with two to three months of radiation in the winter of 2018. He does not get blood in his stool, but he has noticed that his bowels are less regular than they used to be and occasionally he is more gassy than he used to be. Appetite is good. No central abdominal pain, no back pain. He has generally been doing okay. He wonders if he needs to have any other testing. Wife concerned he is cranky at times  Denies depression  Sleeps well  appetitie good  Goes to gym regularly  Right buttock pain     He has had no GI symptoms. He has normal renal function. He has not had any GERD or indigestion, although does take Omeprazole occasionally for some GI upset. He wanted me to refill it.   His labs that were done within the last week showed normal lipids, normal renal function and a slight borderline low anemia secondary to recent radiation. Review of Systems - General ROS: positive for  - fatigue  Psychological ROS: negative for - depression  Hematological and Lymphatic ROS: negative  Endocrine ROS: negative for - hair pattern changes, hot flashes or palpitations  Respiratory ROS: no cough, shortness of breath, or wheezing  Cardiovascular ROS: no chest pain or dyspnea on exertion  Gastrointestinal ROS: no abdominal pain, change in bowel habits, or black or bloody stools  Genito-Urinary ROS: no dysuria, trouble voiding, or hematuria  Musculoskeletal ROS: positive for - gait disturbance, joint pain, joint stiffness and joint swelling  Off nsaid uses tylenol 650  Neurological ROS: no TIA or stroke symptoms  Dermatological ROS: negative for - mole changes, nail changes or skin lesion changes    Vitals:    05/10/21 1340   BP: 127/64   Pulse: (!) 59   Resp: 16   Temp: 97.3 °F (36.3 °C)   SpO2: 97%   Weight: 175 lb (79.4 kg)   Height: 5' 9\" (1.753 m)     Physical Examination:  His BP was normal.  S1, S2 regular. Lungs clear. Abdomen soft. Neurologically intact. Legs chroniv brawny edema   But no calf tenderness no real slrt and slight tenderness right buttock  ekg bradycardia  Plan:  I reviewed his labs, reviewed side effects. He will continue to follow lab work every six months. He is doing well in terms of the radiation for his prostate. His PSA last week was 0.0 per the patient.      Lab Results   Component Value Date/Time    GFR est AA 80 05/05/2021 08:52 AM    GFR est non-AA 70 05/05/2021 08:52 AM    Creatinine (POC) 0.8 07/31/2018 12:53 PM    Creatinine 0.98 05/05/2021 08:52 AM    BUN 24 05/05/2021 08:52 AM    Sodium 144 05/05/2021 08:52 AM    Potassium 5.1 05/05/2021 08:52 AM    Chloride 106 05/05/2021 08:52 AM    CO2 25 05/05/2021 08:52 AM     Lab Results   Component Value Date/Time    WBC 4.6 05/05/2021 08:52 AM    WBC 5.4 05/15/2012 12:00 AM    HGB 12.6 (L) 05/05/2021 08:52 AM    HCT 37.5 05/05/2021 08:52 AM    PLATELET 264 15/61/8050 08:52 AM    MCV 98 (H) 05/05/2021 08:52 AM     Lab Results   Component Value Date/Time    Cholesterol, total 181 05/05/2021 08:52 AM    HDL Cholesterol 126 05/05/2021 08:52 AM    LDL, calculated 46 05/05/2021 08:52 AM    LDL, calculated 55 04/30/2020 08:11 AM    VLDL, calculated 9 05/05/2021 08:52 AM    VLDL, calculated 13 04/30/2020 08:11 AM    Triglyceride 39 05/05/2021 08:52 AM    CHOL/HDL Ratio 1.4 06/30/2010 08:37 AM       Lab Results   Component Value Date/Time    GFR est AA 80 05/05/2021 08:52 AM    GFR est non-AA 70 05/05/2021 08:52 AM    Creatinine (POC) 0.8 07/31/2018 12:53 PM    Creatinine 0.98 05/05/2021 08:52 AM    BUN 24 05/05/2021 08:52 AM    Sodium 144 05/05/2021 08:52 AM    Potassium 5.1 05/05/2021 08:52 AM    Chloride 106 05/05/2021 08:52 AM    CO2 25 05/05/2021 08:52 AM     1. Carotid artery disease, unspecified laterality, unspecified type ([facility]  No tia or cva symptoms    2. Prostate cancer ([facility]  remission    3. 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 Essential hypertension, benign  Hypertension controlled for age based on guidelines      5. Pure hypercholesterolemia  Lipid abnormality controlled      6. Coronary artery disease involving native coronary artery of native heart without angina pectoris  No symptoms    7. NSAID-associated gastropathy  Has stopped nsaids now and better    8.  Edema, unspecified type  From norvasc

## 2021-09-18 ENCOUNTER — PATIENT MESSAGE (OUTPATIENT)
Dept: INTERNAL MEDICINE CLINIC | Age: 86
End: 2021-09-18

## 2021-09-18 DIAGNOSIS — I25.10 CORONARY ARTERY DISEASE INVOLVING NATIVE CORONARY ARTERY OF NATIVE HEART WITHOUT ANGINA PECTORIS: ICD-10-CM

## 2021-09-18 DIAGNOSIS — I10 ESSENTIAL HYPERTENSION, BENIGN: ICD-10-CM

## 2021-09-18 DIAGNOSIS — E78.00 PURE HYPERCHOLESTEROLEMIA: Primary | ICD-10-CM

## 2021-09-18 DIAGNOSIS — E74.39 GLUCOSE INTOLERANCE: ICD-10-CM

## 2021-09-21 NOTE — TELEPHONE ENCOUNTER
From: Geraldo Mathew  To: Estee Cox MD  Sent: 9/18/2021 8:27 AM EDT  Subject: Non-Urgent Medical Question    Please forward blood work request to [employer] as in previous years. Thank you.     Alana Jones

## 2021-10-09 LAB
ALBUMIN SERPL-MCNC: 4.4 G/DL (ref 3.6–4.6)
ALBUMIN/GLOB SERPL: 1.5 {RATIO} (ref 1.2–2.2)
ALP SERPL-CCNC: 67 IU/L (ref 44–121)
ALT SERPL-CCNC: 21 IU/L (ref 0–44)
AST SERPL-CCNC: 30 IU/L (ref 0–40)
BASOPHILS # BLD AUTO: 0 X10E3/UL (ref 0–0.2)
BASOPHILS NFR BLD AUTO: 1 %
BILIRUB SERPL-MCNC: 1.1 MG/DL (ref 0–1.2)
BUN SERPL-MCNC: 19 MG/DL (ref 8–27)
BUN/CREAT SERPL: 18 (ref 10–24)
CALCIUM SERPL-MCNC: 9.5 MG/DL (ref 8.6–10.2)
CHLORIDE SERPL-SCNC: 104 MMOL/L (ref 96–106)
CHOLEST SERPL-MCNC: 178 MG/DL (ref 100–199)
CO2 SERPL-SCNC: 26 MMOL/L (ref 20–29)
CREAT SERPL-MCNC: 1.04 MG/DL (ref 0.76–1.27)
EOSINOPHIL # BLD AUTO: 0.2 X10E3/UL (ref 0–0.4)
EOSINOPHIL NFR BLD AUTO: 4 %
ERYTHROCYTE [DISTWIDTH] IN BLOOD BY AUTOMATED COUNT: 12.5 % (ref 11.6–15.4)
GLOBULIN SER CALC-MCNC: 2.9 G/DL (ref 1.5–4.5)
GLUCOSE SERPL-MCNC: 102 MG/DL (ref 65–99)
HCT VFR BLD AUTO: 39.6 % (ref 37.5–51)
HDLC SERPL-MCNC: 110 MG/DL
HGB BLD-MCNC: 13.1 G/DL (ref 13–17.7)
IMM GRANULOCYTES # BLD AUTO: 0 X10E3/UL (ref 0–0.1)
IMM GRANULOCYTES NFR BLD AUTO: 0 %
IMP & REVIEW OF LAB RESULTS: NORMAL
LDLC SERPL CALC-MCNC: 58 MG/DL (ref 0–99)
LYMPHOCYTES # BLD AUTO: 1.5 X10E3/UL (ref 0.7–3.1)
LYMPHOCYTES NFR BLD AUTO: 30 %
MCH RBC QN AUTO: 33.7 PG (ref 26.6–33)
MCHC RBC AUTO-ENTMCNC: 33.1 G/DL (ref 31.5–35.7)
MCV RBC AUTO: 102 FL (ref 79–97)
MONOCYTES # BLD AUTO: 0.7 X10E3/UL (ref 0.1–0.9)
MONOCYTES NFR BLD AUTO: 15 %
NEUTROPHILS # BLD AUTO: 2.6 X10E3/UL (ref 1.4–7)
NEUTROPHILS NFR BLD AUTO: 50 %
PLATELET # BLD AUTO: 178 X10E3/UL (ref 150–450)
POTASSIUM SERPL-SCNC: 4.6 MMOL/L (ref 3.5–5.2)
PROT SERPL-MCNC: 7.3 G/DL (ref 6–8.5)
RBC # BLD AUTO: 3.89 X10E6/UL (ref 4.14–5.8)
SODIUM SERPL-SCNC: 143 MMOL/L (ref 134–144)
TRIGL SERPL-MCNC: 50 MG/DL (ref 0–149)
VLDLC SERPL CALC-MCNC: 10 MG/DL (ref 5–40)
WBC # BLD AUTO: 5 X10E3/UL (ref 3.4–10.8)

## 2021-10-20 ENCOUNTER — OFFICE VISIT (OUTPATIENT)
Dept: INTERNAL MEDICINE CLINIC | Age: 86
End: 2021-10-20
Payer: MEDICARE

## 2021-10-20 VITALS
HEIGHT: 69 IN | SYSTOLIC BLOOD PRESSURE: 128 MMHG | TEMPERATURE: 98.6 F | HEART RATE: 68 BPM | BODY MASS INDEX: 25.18 KG/M2 | OXYGEN SATURATION: 98 % | DIASTOLIC BLOOD PRESSURE: 70 MMHG | WEIGHT: 170 LBS

## 2021-10-20 DIAGNOSIS — I10 ESSENTIAL HYPERTENSION, BENIGN: ICD-10-CM

## 2021-10-20 DIAGNOSIS — C61 PROSTATE CANCER (HCC): ICD-10-CM

## 2021-10-20 DIAGNOSIS — K86.2 PANCREATIC CYST: Primary | ICD-10-CM

## 2021-10-20 DIAGNOSIS — R60.9 EDEMA, UNSPECIFIED TYPE: ICD-10-CM

## 2021-10-20 DIAGNOSIS — Z00.00 GENERAL MEDICAL EXAMINATION: ICD-10-CM

## 2021-10-20 DIAGNOSIS — R19.8 CHANGE IN BOWEL FUNCTION: ICD-10-CM

## 2021-10-20 DIAGNOSIS — R73.03 PREDIABETES: ICD-10-CM

## 2021-10-20 PROCEDURE — 99214 OFFICE O/P EST MOD 30 MIN: CPT | Performed by: INTERNAL MEDICINE

## 2021-10-20 PROCEDURE — G8536 NO DOC ELDER MAL SCRN: HCPCS | Performed by: INTERNAL MEDICINE

## 2021-10-20 PROCEDURE — G0439 PPPS, SUBSEQ VISIT: HCPCS | Performed by: INTERNAL MEDICINE

## 2021-10-20 PROCEDURE — G8427 DOCREV CUR MEDS BY ELIG CLIN: HCPCS | Performed by: INTERNAL MEDICINE

## 2021-10-20 PROCEDURE — G8419 CALC BMI OUT NRM PARAM NOF/U: HCPCS | Performed by: INTERNAL MEDICINE

## 2021-10-20 PROCEDURE — G8432 DEP SCR NOT DOC, RNG: HCPCS | Performed by: INTERNAL MEDICINE

## 2021-10-20 PROCEDURE — 1101F PT FALLS ASSESS-DOCD LE1/YR: CPT | Performed by: INTERNAL MEDICINE

## 2021-10-20 RX ORDER — DIPHENHYDRAMINE HCL 25 MG
25 TABLET ORAL
COMMUNITY

## 2021-10-20 RX ORDER — AMLODIPINE BESYLATE 5 MG/1
7.5 TABLET ORAL DAILY
Qty: 135 TABLET | Refills: 3 | Status: SHIPPED | OUTPATIENT
Start: 2021-10-20 | End: 2021-12-08 | Stop reason: ALTCHOICE

## 2021-10-20 NOTE — PROGRESS NOTES
Subjective: This is an 80year old gentleman with long term hypertension, long term dyslipidemia, normal cardiac cath a number of years ago. No cardiac symptoms. His biggest issue is over the winter of 2019 had radiation therapy external beam.  This was done in [place] in [place] for the winter. He is now feeling pretty well. He has had a little bit of urinary frequency. He has had a little bit of bowel irregularity. It has only been26 month since his last radiation treatment. Is here for the summer. Has a boat. Is active. Exercises. He is a little bit slower than he used to be. He is upset over the slight pudginess that he has developed. Subjective:  He has noted that his heart monitor that he carries around with him occasionally shows that his pulse is irregular. He has never had documented a-fib. e generally has been doing well. He is now happy that he is starting to exercise again. He had not been exercising as much over the last couple months related to the COVID epidemic. He had questions about some gassy distention that he gets in the lower belly at times. He was treated with two to three months of radiation in the winter of 2018. He does not get blood in his stool, but he has noticed that his bowels are less regular than they used to be and occasionally he is more gassy than he used to be. Appetite is good. No central abdominal pain, no back pain. He has generally been doing okay. He wonders if he needs to have any other testing. Wife concerned he is cranky at times  Denies depression  Sleeps well  appetitie good  Goes to gym regularly  Right buttock pain     He has had no GI symptoms. He has normal renal function. He has not had any GERD or indigestion, although does take Omeprazole occasionally for some GI upset. He wanted me to refill it.   His labs that were done within the last week showed normal lipids, normal renal function and a slight borderline low anemia secondary to recent radiation. Having bowel changes   m ore gassy bloating no pain  Last psa low      Review of Systems - General ROS: positive for  - fatigue  Psychological ROS: negative for - depression  Hematological and Lymphatic ROS: negative  Endocrine ROS: negative for - hair pattern changes, hot flashes or palpitations  Respiratory ROS: no cough, shortness of breath, or wheezing  Cardiovascular ROS: no chest pain or dyspnea on exertion  Gastrointestinal ROS: no abdominal pain, change in bowel habits, or black or bloody stools  Genito-Urinary ROS: no dysuria, trouble voiding, or hematuria  Musculoskeletal ROS: positive for - gait disturbance, joint pain, joint stiffness and joint swelling  Off nsaid uses tylenol 650  Neurological ROS: no TIA or stroke symptoms  Dermatological ROS: negative for - mole changes, nail changes or skin lesion changes    Vitals:    10/20/21 1029 10/20/21 1049   BP: 128/67 128/70   Pulse: 68    Temp: 98.6 °F (37 °C)    TempSrc: Temporal    SpO2: 98%    Weight: 170 lb (77.1 kg)    Height: 5' 9\" (1.753 m)      Physical Examination:  His BP was normal.  S1, S2 regular. Lungs clear. Abdomen soft. Neurologically intact. Legs chroniv brawny edema   But no calf tenderness    Edema BILAt feet  ekg bradycardia  Plan:  I reviewed his labs, reviewed side effects. He will continue to follow lab work every six months. He is doing well in terms of the radiation for his prostate. His PSA last week was 0.0 per the patient.      Lab Results   Component Value Date/Time    GFR est AA 74 10/08/2021 08:05 AM    GFR est non-AA 64 10/08/2021 08:05 AM    Creatinine (POC) 0.8 07/31/2018 12:53 PM    Creatinine 1.04 10/08/2021 08:05 AM    BUN 19 10/08/2021 08:05 AM    Sodium 143 10/08/2021 08:05 AM    Potassium 4.6 10/08/2021 08:05 AM    Chloride 104 10/08/2021 08:05 AM    CO2 26 10/08/2021 08:05 AM     Lab Results   Component Value Date/Time    WBC 5.0 10/08/2021 08:05 AM    WBC 5.4 05/15/2012 12:00 AM    HGB 13.1 10/08/2021 08:05 AM    HCT 39.6 10/08/2021 08:05 AM    PLATELET 801 44/33/6989 08:05 AM     (H) 10/08/2021 08:05 AM     Lab Results   Component Value Date/Time    Cholesterol, total 178 10/08/2021 08:05 AM    HDL Cholesterol 110 10/08/2021 08:05 AM    LDL, calculated 58 10/08/2021 08:05 AM    LDL, calculated 55 04/30/2020 08:11 AM    VLDL, calculated 10 10/08/2021 08:05 AM    VLDL, calculated 13 04/30/2020 08:11 AM    Triglyceride 50 10/08/2021 08:05 AM    CHOL/HDL Ratio 1.4 06/30/2010 08:37 AM       Lab Results   Component Value Date/Time    GFR est AA 74 10/08/2021 08:05 AM    GFR est non-AA 64 10/08/2021 08:05 AM    Creatinine (POC) 0.8 07/31/2018 12:53 PM    Creatinine 1.04 10/08/2021 08:05 AM    BUN 19 10/08/2021 08:05 AM    Sodium 143 10/08/2021 08:05 AM    Potassium 4.6 10/08/2021 08:05 AM    Chloride 104 10/08/2021 08:05 AM    CO2 26 10/08/2021 08:05 AM   ontrolled      1. Pancreatic cyst  follow  - CT ABD PELV WO CONT; Future    2. Change in bowel function  No colon add fiber  - CT ABD PELV WO CONT; Future    3. Essential hypertension, benign  Lower due to side effect  - amLODIPine (NORVASC) 5 mg tablet; Take 1.5 Tablets by mouth daily. Dispense: 135 Tablet; Refill: 3    4. Prostate cancer ([address]  remission    5. Edema, unspecified type  unchanged    6.  Prediabetes  Lab Results   Component Value Date/Time    Glucose 102 (H) 10/08/2021 08:05 AM     Lab Results   Component Value Date/Time    Hemoglobin A1c 5.5 05/05/2021 08:52 AM

## 2021-10-20 NOTE — PROGRESS NOTES
Identified pt with two pt identifiers(name and ). Reviewed record in preparation for visit and have obtained necessary documentation. Chief Complaint   Patient presents with    Follow-up        Vitals:    10/20/21 1029   BP: 128/67   Pulse: 68   Temp: 98.6 °F (37 °C)   TempSrc: Temporal   SpO2: 98%   Weight: 170 lb (77.1 kg)   Height: 5' 9\" (1.753 m)   PainSc:   0 - No pain       There are no preventive care reminders to display for this patient. Coordination of Care Questionnaire:  :   1) Have you been to an emergency room, urgent care, or hospitalized since your last visit? If yes, where when, and reason for visit? No      2. Have seen or consulted any other health care provider since your last visit? If yes, where when, and reason for visit?   No

## 2021-11-12 ENCOUNTER — HOSPITAL ENCOUNTER (OUTPATIENT)
Dept: CT IMAGING | Age: 86
Discharge: HOME | End: 2021-11-12
Attending: INTERNAL MEDICINE
Payer: MEDICARE

## 2021-11-12 DIAGNOSIS — K86.2 PANCREATIC CYST: ICD-10-CM

## 2021-11-12 DIAGNOSIS — R19.8 CHANGE IN BOWEL FUNCTION: ICD-10-CM

## 2021-11-12 PROCEDURE — 74176 CT ABD & PELVIS W/O CONTRAST: CPT

## 2021-12-05 ENCOUNTER — PATIENT MESSAGE (OUTPATIENT)
Dept: INTERNAL MEDICINE CLINIC | Age: 86
End: 2021-12-05

## 2021-12-08 RX ORDER — DILTIAZEM HYDROCHLORIDE 240 MG/1
240 CAPSULE, COATED, EXTENDED RELEASE ORAL DAILY
Qty: 90 CAPSULE | Refills: 0 | Status: SHIPPED | OUTPATIENT
Start: 2021-12-08 | End: 2022-03-20

## 2021-12-08 NOTE — TELEPHONE ENCOUNTER
From: June Daniels  To: Ariadne Gutierrez MD  Sent: 12/5/2021 11:18 AM EST  Subject: Abdominal Scan    Who would you recommend I see with my abdominal scan.   May Phillips

## 2021-12-21 DIAGNOSIS — I10 ESSENTIAL HYPERTENSION, BENIGN: ICD-10-CM

## 2021-12-21 RX ORDER — OMEPRAZOLE 20 MG/1
CAPSULE, DELAYED RELEASE ORAL
Qty: 90 CAPSULE | Refills: 3 | Status: SHIPPED | OUTPATIENT
Start: 2021-12-21 | End: 2022-11-04

## 2021-12-21 RX ORDER — LOSARTAN POTASSIUM 50 MG/1
TABLET ORAL
Qty: 90 TABLET | Refills: 3 | Status: SHIPPED | OUTPATIENT
Start: 2021-12-21 | End: 2022-11-04

## 2022-02-07 RX ORDER — FLUTICASONE PROPIONATE 50 MCG
SPRAY, SUSPENSION (ML) NASAL
Qty: 48 G | Refills: 3 | Status: SHIPPED | OUTPATIENT
Start: 2022-02-07

## 2022-02-07 RX ORDER — HYDROCHLOROTHIAZIDE 25 MG/1
TABLET ORAL
Qty: 90 TABLET | Refills: 3 | Status: SHIPPED | OUTPATIENT
Start: 2022-02-07

## 2022-02-07 RX ORDER — TAMSULOSIN HYDROCHLORIDE 0.4 MG/1
CAPSULE ORAL
Qty: 180 CAPSULE | Refills: 1 | Status: SHIPPED | OUTPATIENT
Start: 2022-02-07 | End: 2022-11-04

## 2022-03-16 ENCOUNTER — PATIENT MESSAGE (OUTPATIENT)
Dept: INTERNAL MEDICINE CLINIC | Age: 87
End: 2022-03-16

## 2022-03-16 DIAGNOSIS — Z79.899 HIGH RISK MEDICATION USE: ICD-10-CM

## 2022-03-16 DIAGNOSIS — K86.2 PANCREATIC CYST: ICD-10-CM

## 2022-03-16 DIAGNOSIS — I10 ESSENTIAL HYPERTENSION, BENIGN: Primary | ICD-10-CM

## 2022-03-16 DIAGNOSIS — R73.03 PREDIABETES: ICD-10-CM

## 2022-03-16 DIAGNOSIS — E78.00 PURE HYPERCHOLESTEROLEMIA: ICD-10-CM

## 2022-03-17 NOTE — TELEPHONE ENCOUNTER
From: Katie Zarco  To: Eleonora Mcconnell MD  Sent: 3/16/2022 11:01 AM EDT  Subject: Semiannual visit with you late April     We are in [place] till mid April   Prior to seeing you in late April and maybe later a specialist please send the bloodwork order to [facility] and a new CT Scan order for throat to prostate ( since current is required I understand) copies to me.  which I can schedule at [facility] . We can then discuss both results during my visit with you. Thank you, Veronica Rodriguez.  [phone]

## 2022-03-18 PROBLEM — C61 PROSTATE CANCER (HCC): Status: ACTIVE | Noted: 2019-05-06

## 2022-03-19 PROBLEM — R91.1 NODULE OF LEFT LUNG: Status: ACTIVE | Noted: 2018-11-12

## 2022-03-19 PROBLEM — M17.12 ARTHRITIS OF KNEE, LEFT: Status: ACTIVE | Noted: 2019-08-16

## 2022-04-14 LAB
ALBUMIN SERPL-MCNC: 4.1 G/DL (ref 3.6–4.6)
ALBUMIN/GLOB SERPL: 1.3 {RATIO} (ref 1.2–2.2)
ALP SERPL-CCNC: 77 IU/L (ref 44–121)
ALT SERPL-CCNC: 19 IU/L (ref 0–44)
AST SERPL-CCNC: 24 IU/L (ref 0–40)
BASOPHILS # BLD AUTO: 0 X10E3/UL (ref 0–0.2)
BASOPHILS NFR BLD AUTO: 1 %
BILIRUB SERPL-MCNC: 0.9 MG/DL (ref 0–1.2)
BUN SERPL-MCNC: 18 MG/DL (ref 8–27)
BUN/CREAT SERPL: 17 (ref 10–24)
CALCIUM SERPL-MCNC: 9.5 MG/DL (ref 8.6–10.2)
CHLORIDE SERPL-SCNC: 106 MMOL/L (ref 96–106)
CHOLEST SERPL-MCNC: 166 MG/DL (ref 100–199)
CO2 SERPL-SCNC: 23 MMOL/L (ref 20–29)
CREAT SERPL-MCNC: 1.05 MG/DL (ref 0.76–1.27)
EGFR: 69 ML/MIN/1.73
EOSINOPHIL # BLD AUTO: 0.2 X10E3/UL (ref 0–0.4)
EOSINOPHIL NFR BLD AUTO: 3 %
ERYTHROCYTE [DISTWIDTH] IN BLOOD BY AUTOMATED COUNT: 12.4 % (ref 11.6–15.4)
GLOBULIN SER CALC-MCNC: 3.2 G/DL (ref 1.5–4.5)
GLUCOSE SERPL-MCNC: 94 MG/DL (ref 65–99)
HCT VFR BLD AUTO: 38.1 % (ref 37.5–51)
HDLC SERPL-MCNC: 100 MG/DL
HGB BLD-MCNC: 12.7 G/DL (ref 13–17.7)
IMM GRANULOCYTES # BLD AUTO: 0 X10E3/UL (ref 0–0.1)
IMM GRANULOCYTES NFR BLD AUTO: 0 %
IMP & REVIEW OF LAB RESULTS: NORMAL
LDLC SERPL CALC-MCNC: 54 MG/DL (ref 0–99)
LYMPHOCYTES # BLD AUTO: 1.8 X10E3/UL (ref 0.7–3.1)
LYMPHOCYTES NFR BLD AUTO: 30 %
MAGNESIUM SERPL-MCNC: 2.3 MG/DL (ref 1.6–2.3)
MCH RBC QN AUTO: 34.4 PG (ref 26.6–33)
MCHC RBC AUTO-ENTMCNC: 33.3 G/DL (ref 31.5–35.7)
MCV RBC AUTO: 103 FL (ref 79–97)
MONOCYTES # BLD AUTO: 0.8 X10E3/UL (ref 0.1–0.9)
MONOCYTES NFR BLD AUTO: 13 %
NEUTROPHILS # BLD AUTO: 3.2 X10E3/UL (ref 1.4–7)
NEUTROPHILS NFR BLD AUTO: 53 %
PLATELET # BLD AUTO: 152 X10E3/UL (ref 150–450)
POTASSIUM SERPL-SCNC: 4.9 MMOL/L (ref 3.5–5.2)
PROT SERPL-MCNC: 7.3 G/DL (ref 6–8.5)
RBC # BLD AUTO: 3.69 X10E6/UL (ref 4.14–5.8)
SODIUM SERPL-SCNC: 145 MMOL/L (ref 134–144)
TRIGL SERPL-MCNC: 60 MG/DL (ref 0–149)
VLDLC SERPL CALC-MCNC: 12 MG/DL (ref 5–40)
WBC # BLD AUTO: 5.9 X10E3/UL (ref 3.4–10.8)

## 2022-04-19 ENCOUNTER — OFFICE VISIT (OUTPATIENT)
Dept: INTERNAL MEDICINE CLINIC | Age: 87
End: 2022-04-19
Payer: MEDICARE

## 2022-04-19 VITALS
SYSTOLIC BLOOD PRESSURE: 129 MMHG | BODY MASS INDEX: 26.19 KG/M2 | OXYGEN SATURATION: 95 % | HEART RATE: 47 BPM | HEIGHT: 69 IN | DIASTOLIC BLOOD PRESSURE: 56 MMHG | TEMPERATURE: 98.5 F | RESPIRATION RATE: 16 BRPM | WEIGHT: 176.8 LBS

## 2022-04-19 DIAGNOSIS — R00.1 BRADYCARDIA: Primary | ICD-10-CM

## 2022-04-19 DIAGNOSIS — E78.00 PURE HYPERCHOLESTEROLEMIA: ICD-10-CM

## 2022-04-19 DIAGNOSIS — I48.91 ATRIAL FIBRILLATION, UNSPECIFIED TYPE (HCC): ICD-10-CM

## 2022-04-19 DIAGNOSIS — I10 ESSENTIAL HYPERTENSION, BENIGN: ICD-10-CM

## 2022-04-19 DIAGNOSIS — I48.91 ATRIAL FIBRILLATION WITH SLOW VENTRICULAR RESPONSE (HCC): ICD-10-CM

## 2022-04-19 DIAGNOSIS — R60.9 EDEMA, UNSPECIFIED TYPE: ICD-10-CM

## 2022-04-19 PROCEDURE — G8427 DOCREV CUR MEDS BY ELIG CLIN: HCPCS | Performed by: INTERNAL MEDICINE

## 2022-04-19 PROCEDURE — G8510 SCR DEP NEG, NO PLAN REQD: HCPCS | Performed by: INTERNAL MEDICINE

## 2022-04-19 PROCEDURE — 93000 ELECTROCARDIOGRAM COMPLETE: CPT | Performed by: INTERNAL MEDICINE

## 2022-04-19 PROCEDURE — 99214 OFFICE O/P EST MOD 30 MIN: CPT | Performed by: INTERNAL MEDICINE

## 2022-04-19 PROCEDURE — 1101F PT FALLS ASSESS-DOCD LE1/YR: CPT | Performed by: INTERNAL MEDICINE

## 2022-04-19 PROCEDURE — G8419 CALC BMI OUT NRM PARAM NOF/U: HCPCS | Performed by: INTERNAL MEDICINE

## 2022-04-19 PROCEDURE — G8536 NO DOC ELDER MAL SCRN: HCPCS | Performed by: INTERNAL MEDICINE

## 2022-04-19 RX ORDER — DILTIAZEM HYDROCHLORIDE 120 MG/1
120 CAPSULE, COATED, EXTENDED RELEASE ORAL DAILY
Qty: 90 CAPSULE | Refills: 1 | Status: SHIPPED | OUTPATIENT
Start: 2022-04-19

## 2022-04-19 NOTE — PROGRESS NOTES
Subjective: This is an 80year old gentleman with long term hypertension, long term dyslipidemia, normal cardiac cath a number of years ago. No cardiac symptoms. His biggest issue is over the winter of 2019 had radiation therapy external beam.  This was done in [place] in [place]. He is now feeling pretty well. He has had a little bit of urinary frequency. He has had a little bit of bowel irregularity. It has only been26 month since his last radiation treatment. Is here for the summer. Has a boat. Is active. Exercises. He is a little bit slower than he used to be. He is upset over the slight pudginess that he has developed. Subjective:  He has noted that his heart monitor that he carries around with him occasionally shows that his pulse is irregular. He has never had documented a-fib. e generally has been doing well. He is now happy that he is starting to exercise again. He had not been exercising as much over the last couple months related to the COVID epidemic. He had questions about some gassy distention that he gets in the lower belly at times. He was treated with two to three months of radiation in the winter of 2018. He does not get blood in his stool, but he has noticed that his bowels are less regular than they used to be and occasionally he is more gassy than he used to be. Appetite is good. No central abdominal pain, no back pain. He has generally been doing okay. He wonders if he needs to have any other testing. Wife concerned he is cranky at times  Denies depression  Sleeps well  appetitie good  Goes to gym regularly  Right buttock pain     He has had no GI symptoms. He has normal renal function. He has not had any GERD or indigestion, although does take Omeprazole occasionally for some GI upset. He wanted me to refill it.   His labs that were done within the last week showed normal lipids, normal renal function and a slight borderline low anemia secondary to recent radiation. Having bowel changes   m narda gassy bloating no pain  Last psa low  We will start with the change with calcium channel blocker, diltiazem. He had less edema for the first three months. He has noticed for the last month or two more leg edema if will not wear stockings. It is better when he gets up in the morning, worse as the day goes on. He has also noticed that his resting pulse rate has been lower 40s to 50s. He uses a home heart monitor and he has had a couple of beats. According to his monitor, it says AFib. The other beats were all same. He did not let anybody know. This has been coming and going for a couple of months. He has had no dizziness, no lightheadedness, no particular shortness of breath. No chest pain. His exercise tolerance is a little bit less. He exercises at the gym, but cannot walk on a treadmill as long as he used to. He notes that he cannot get his heart rate up as high as he used to on admission. He is 80, quite active. Tries to do things that he did when he was 48. Denies any neurological symptoms. Denies TIA. Denies loss of vision. Denies loss of taste or smell. Denies hemiparetic symptoms. He has nocturia x 5. Has a urology appointment in a couple of weeks.       Lab work that he had done last week, his chemistries all look pretty normal.  BUN and creatinine are normal.  Liver enzymes are normal and his lipid profile is normal.        Review of Systems - General ROS: positive for  - fatigue  Psychological ROS: negative for - depression  Hematological and Lymphatic ROS: negative  Endocrine ROS: negative for - hair pattern changes, hot flashes or palpitations  Respiratory ROS: no cough, shortness of breath, or wheezing  Cardiovascular ROS: no chest pain or dyspnea on exertion  Gastrointestinal ROS: no abdominal pain, change in bowel habits, or black or bloody stools  Genito-Urinary ROS: no dysuria, trouble voiding, or hematuria  Musculoskeletal ROS: positive for - gait disturbance, joint pain, joint stiffness and joint swelling  Off nsaid uses tylenol 650  Neurological ROS: no TIA or stroke symptoms  Dermatological ROS: negative for - mole changes, nail changes or skin lesion changes    Vitals:    04/19/22 1049   BP: (!) 129/56   Pulse: (!) 47   Resp: 16   Temp: 98.5 °F (36.9 °C)   TempSrc: Temporal   SpO2: 95%   Weight: 176 lb 12.8 oz (80.2 kg)   Height: 5' 9\" (1.753 m)     Physical Examination:  His BP was normal.  S1, S2 regular slow rate. Lungs clear. Abdomen soft. Neurologically intact.  Legs chroniv brawny edema   But no calf tenderness    Edema BILAt feet  ekg bradycardia prob a fib    Lab Results   Component Value Date/Time    GFR est AA 74 10/08/2021 08:05 AM    GFR est non-AA 64 10/08/2021 08:05 AM    Creatinine (POC) 0.8 07/31/2018 12:53 PM    Creatinine 1.05 04/13/2022 07:59 AM    BUN 18 04/13/2022 07:59 AM    Sodium 145 (H) 04/13/2022 07:59 AM    Potassium 4.9 04/13/2022 07:59 AM    Chloride 106 04/13/2022 07:59 AM    CO2 23 04/13/2022 07:59 AM     Lab Results   Component Value Date/Time    WBC 5.9 04/13/2022 07:59 AM    WBC 5.4 05/15/2012 12:00 AM    HGB 12.7 (L) 04/13/2022 07:59 AM    HCT 38.1 04/13/2022 07:59 AM    PLATELET 289 66/53/7237 07:59 AM     (H) 04/13/2022 07:59 AM     Lab Results   Component Value Date/Time    Cholesterol, total 166 04/13/2022 07:59 AM    HDL Cholesterol 100 04/13/2022 07:59 AM    LDL, calculated 54 04/13/2022 07:59 AM    LDL, calculated 55 04/30/2020 08:11 AM    VLDL, calculated 12 04/13/2022 07:59 AM    VLDL, calculated 13 04/30/2020 08:11 AM    Triglyceride 60 04/13/2022 07:59 AM    CHOL/HDL Ratio 1.4 06/30/2010 08:37 AM       Lab Results   Component Value Date/Time    GFR est AA 74 10/08/2021 08:05 AM    GFR est non-AA 64 10/08/2021 08:05 AM    Creatinine (POC) 0.8 07/31/2018 12:53 PM    Creatinine 1.05 04/13/2022 07:59 AM    BUN 18 04/13/2022 07:59 AM    Sodium 145 (H) 2022 07:59 AM    Potassium 4.9 2022 07:59 AM    Chloride 106 2022 07:59 AM    CO2 23 2022 07:59 AM         Lab Results   Component Value Date/Time    Hemoglobin A1c 5.5 2021 08:52 AM       1. Bradycardia  Ask him to see DR hanks ( has seen before)  - AMB POC EKG ROUTINE W/ 12 LEADS, INTER & REP  - REFERRAL TO CARDIOLOGY    2. Atrial fibrillation, unspecified type ([address]  Likely he needs monitor and anticoag  - REFERRAL TO CARDIOLOGY    3. Essential hypertension, benign  Lower dose dilt to 120    4. Pure hypercholesterolemia  controlled    5. Edema, unspecified type  Non specific    6. Atrial fibrillation with slow ventricular response (HCC)  Worry this is new likely    Requested Prescriptions     Signed Prescriptions Disp Refills    dilTIAZem ER (CARDIZEM CD) 120 mg capsule 90 Capsule 1     Sig: Take 1 Capsule by mouth daily.  varicella-zoster [facility]) injection 1 Each 1     Si Each by IntraMUSCular route once for 1 dose.

## 2022-04-19 NOTE — PROGRESS NOTES
1. Have you been to the ER, urgent care clinic since your last visit? Hospitalized since your last visit? No    2. Have you seen or consulted any other health care providers outside of the [address] [place] [address] since your last visit? Include any pap smears or colon screening.  No   Chief Complaint   Patient presents with    Hypertension     6 months follow up

## 2022-04-21 ENCOUNTER — TELEPHONE (OUTPATIENT)
Dept: CARDIOLOGY CLINIC | Age: 87
End: 2022-04-21

## 2022-04-21 ENCOUNTER — DOCUMENTATION ONLY (OUTPATIENT)
Dept: CARDIOLOGY CLINIC | Age: 87
End: 2022-04-21

## 2022-04-21 NOTE — PROGRESS NOTES
I saw him in 2020 he had PACs  More irregular beats per Dr Traci Mcduffie  I will send 30 day loop recorder for PAF and make appt for future follow up

## 2022-04-21 NOTE — TELEPHONE ENCOUNTER
Per Dr. Lindsey Del Cid \"I saw him in 2020 he had PACs   More irregular beats   Can you send 30 day loop recorder for PAF and make appt for future follow up. \"    Verified patient with two types of identifiers. Notified patient of Dr. Mason ashby. Discussed that we want to see if he is having PACs like he was having in 2020 or possibly PAF. Patient declined loop monitor at this time. He said his main concern discussed with Dr. Taylor Foster was his lower heart rate and fatigue which is why his diltiazem was decreased. Explained the loop monitor could evaluate bradycardia as well. Patient would like to see how he does on the medication change first prior to loop monitor. Notified patient to just be aware that because his diltiazem dose was decreased his palpitations may increase; if that is the case, told patient to call our office back and we will send a loop monitor. Patient verbalized understanding and will call with any other questions.

## 2022-05-11 ENCOUNTER — HOSPITAL ENCOUNTER (OUTPATIENT)
Dept: CT IMAGING | Age: 87
Discharge: HOME | End: 2022-05-11
Attending: INTERNAL MEDICINE
Payer: MEDICARE

## 2022-05-11 DIAGNOSIS — K86.2 PANCREATIC CYST: ICD-10-CM

## 2022-05-11 PROCEDURE — 74176 CT ABD & PELVIS W/O CONTRAST: CPT

## 2022-05-12 ENCOUNTER — TELEPHONE (OUTPATIENT)
Dept: INTERNAL MEDICINE CLINIC | Age: 87
End: 2022-05-12

## 2022-05-12 NOTE — TELEPHONE ENCOUNTER
Spoke with patient states he and his wife took a home COVID test which was positive. Patient states that his a head cold with with drainage .  Patient is requesting antiviral medication to be sent for him and his wife

## 2022-05-12 NOTE — TELEPHONE ENCOUNTER
Patient states that he and his wife tested positive for COVID and need Dr. Cammy Dailey to send antiviral medication to the pharmacy.

## 2022-05-13 ENCOUNTER — TELEPHONE (OUTPATIENT)
Dept: CARDIOLOGY CLINIC | Age: 87
End: 2022-05-13

## 2022-05-13 ENCOUNTER — TELEPHONE (OUTPATIENT)
Dept: INTERNAL MEDICINE CLINIC | Age: 87
End: 2022-05-13

## 2022-05-13 NOTE — TELEPHONE ENCOUNTER
Clay Degroot, BLAISE Trammell, LAURA; Rob Pierce, LAURA; Gautam Padron  See below. [facility] schedule appointment, not urgent.  Dr. Pao Fernández has commented on his monitor previously, but doesn't appear that he's ever actually seen him, so he'd be a new patient.             Previous Message    Office Visit for Hypertension  4/19/2022  Mo Valente MD - Counts include 234 beds at the [facility]    Diagnoses     Bradycardia (Primary)   Atrial fibrillation, unspecified type [facility])   Essential hypertension, benign   Pure hypercholesterolemia   Edema, unspecified type   Atrial fibrillation with slow ventricular response ([facility]

## 2022-05-13 NOTE — TELEPHONE ENCOUNTER
Verified patient with two types of identifiers. Attempted to make appt for patient, but he said he will need to call back next week to schedule.   (Next available new pt appt with Dr. Concha Boyer is currently 7/21/22)

## 2022-08-05 RX ORDER — ATORVASTATIN CALCIUM 20 MG/1
TABLET, FILM COATED ORAL
Qty: 90 TABLET | Refills: 3 | Status: SHIPPED | OUTPATIENT
Start: 2022-08-05

## 2022-11-04 DIAGNOSIS — I10 ESSENTIAL HYPERTENSION, BENIGN: ICD-10-CM

## 2022-11-04 RX ORDER — OMEPRAZOLE 20 MG/1
CAPSULE, DELAYED RELEASE ORAL
Qty: 90 CAPSULE | Refills: 3 | Status: SHIPPED | OUTPATIENT
Start: 2022-11-04

## 2022-11-04 RX ORDER — TAMSULOSIN HYDROCHLORIDE 0.4 MG/1
CAPSULE ORAL
Qty: 180 CAPSULE | Refills: 1 | Status: SHIPPED | OUTPATIENT
Start: 2022-11-04

## 2022-11-04 RX ORDER — LOSARTAN POTASSIUM 50 MG/1
TABLET ORAL
Qty: 90 TABLET | Refills: 3 | Status: SHIPPED | OUTPATIENT
Start: 2022-11-04

## 2022-11-11 ENCOUNTER — TELEPHONE (OUTPATIENT)
Dept: INTERNAL MEDICINE CLINIC | Age: 87
End: 2022-11-11

## 2022-11-11 DIAGNOSIS — I48.91 ATRIAL FIBRILLATION, UNSPECIFIED TYPE (HCC): Primary | ICD-10-CM

## 2022-11-11 DIAGNOSIS — I10 ESSENTIAL HYPERTENSION, BENIGN: ICD-10-CM

## 2022-11-11 DIAGNOSIS — R73.03 PREDIABETES: ICD-10-CM

## 2022-11-11 NOTE — TELEPHONE ENCOUNTER
Information for Provider? Patient said that he needs to go to [facility] a   week before his appt to get his lab work done he said he needs the orders   for that.   ---------------------------------------------------------------------------   --------------   Sravanthi Ariza [facility]   [phone]; OK to leave message on voicemail   ---------------------------------------------------------------------------   --------------   SCRIPT ANSWERS   Relationship to Patient?  Self     Patient wants lab work and need an order before his appointment on 11/29/22

## 2022-11-16 ENCOUNTER — TELEPHONE (OUTPATIENT)
Dept: INTERNAL MEDICINE CLINIC | Age: 87
End: 2022-11-16

## 2022-11-16 NOTE — TELEPHONE ENCOUNTER
Spoke with patient and he's aware about the labs. Paramedian Forehead Flap Text: A decision was made to reconstruct the defect utilizing an interpolation axial flap and a staged reconstruction.  A telfa template was made of the defect.  This telfa template was then used to outline the paramedian forehead pedicle flap.  The donor area for the pedicle flap was then injected with anesthesia.  The flap was excised through the skin and subcutaneous tissue down to the layer of the underlying musculature.  The pedicle flap was carefully excised within this deep plane to maintain its blood supply.  The edges of the donor site were undermined.   The donor site was closed in a primary fashion.  The pedicle was then rotated into position and sutured.  Once the tube was sutured into place, adequate blood supply was confirmed with blanching and refill.  The pedicle was then wrapped with xeroform gauze and dressed appropriately with a telfa and gauze bandage to ensure continued blood supply and protect the attached pedicle.

## 2022-11-24 LAB
ALBUMIN SERPL-MCNC: 4.6 G/DL (ref 3.6–4.6)
ALBUMIN/GLOB SERPL: 1.6 {RATIO} (ref 1.2–2.2)
ALP SERPL-CCNC: 60 IU/L (ref 44–121)
ALT SERPL-CCNC: 18 IU/L (ref 0–44)
AST SERPL-CCNC: 26 IU/L (ref 0–40)
BASOPHILS # BLD AUTO: 0 X10E3/UL (ref 0–0.2)
BASOPHILS NFR BLD AUTO: 1 %
BILIRUB SERPL-MCNC: 1.3 MG/DL (ref 0–1.2)
BUN SERPL-MCNC: 16 MG/DL (ref 8–27)
BUN/CREAT SERPL: 15 (ref 10–24)
CALCIUM SERPL-MCNC: 9.6 MG/DL (ref 8.6–10.2)
CHLORIDE SERPL-SCNC: 105 MMOL/L (ref 96–106)
CHOLEST SERPL-MCNC: 166 MG/DL (ref 100–199)
CO2 SERPL-SCNC: 26 MMOL/L (ref 20–29)
CREAT SERPL-MCNC: 1.08 MG/DL (ref 0.76–1.27)
EGFR: 66 ML/MIN/1.73
EOSINOPHIL # BLD AUTO: 0.1 X10E3/UL (ref 0–0.4)
EOSINOPHIL NFR BLD AUTO: 2 %
ERYTHROCYTE [DISTWIDTH] IN BLOOD BY AUTOMATED COUNT: 12.2 % (ref 11.6–15.4)
GLOBULIN SER CALC-MCNC: 2.9 G/DL (ref 1.5–4.5)
GLUCOSE SERPL-MCNC: 102 MG/DL (ref 70–99)
HCT VFR BLD AUTO: 39.7 % (ref 37.5–51)
HDLC SERPL-MCNC: 113 MG/DL
HGB BLD-MCNC: 13.1 G/DL (ref 13–17.7)
IMM GRANULOCYTES # BLD AUTO: 0 X10E3/UL (ref 0–0.1)
IMM GRANULOCYTES NFR BLD AUTO: 0 %
IMP & REVIEW OF LAB RESULTS: NORMAL
LDLC SERPL CALC-MCNC: 42 MG/DL (ref 0–99)
LYMPHOCYTES # BLD AUTO: 2 X10E3/UL (ref 0.7–3.1)
LYMPHOCYTES NFR BLD AUTO: 41 %
MAGNESIUM SERPL-MCNC: 2.4 MG/DL (ref 1.6–2.3)
MCH RBC QN AUTO: 33.7 PG (ref 26.6–33)
MCHC RBC AUTO-ENTMCNC: 33 G/DL (ref 31.5–35.7)
MCV RBC AUTO: 102 FL (ref 79–97)
MONOCYTES # BLD AUTO: 0.6 X10E3/UL (ref 0.1–0.9)
MONOCYTES NFR BLD AUTO: 13 %
NEUTROPHILS # BLD AUTO: 2.1 X10E3/UL (ref 1.4–7)
NEUTROPHILS NFR BLD AUTO: 43 %
PLATELET # BLD AUTO: 153 X10E3/UL (ref 150–450)
POTASSIUM SERPL-SCNC: 4.5 MMOL/L (ref 3.5–5.2)
PROT SERPL-MCNC: 7.5 G/DL (ref 6–8.5)
RBC # BLD AUTO: 3.89 X10E6/UL (ref 4.14–5.8)
SODIUM SERPL-SCNC: 143 MMOL/L (ref 134–144)
TRIGL SERPL-MCNC: 54 MG/DL (ref 0–149)
VLDLC SERPL CALC-MCNC: 11 MG/DL (ref 5–40)
WBC # BLD AUTO: 4.8 X10E3/UL (ref 3.4–10.8)

## 2022-11-29 ENCOUNTER — OFFICE VISIT (OUTPATIENT)
Dept: INTERNAL MEDICINE CLINIC | Age: 87
End: 2022-11-29
Payer: MEDICARE

## 2022-11-29 VITALS
TEMPERATURE: 98.2 F | BODY MASS INDEX: 25.75 KG/M2 | SYSTOLIC BLOOD PRESSURE: 142 MMHG | RESPIRATION RATE: 19 BRPM | OXYGEN SATURATION: 98 % | WEIGHT: 174.4 LBS | HEART RATE: 54 BPM | DIASTOLIC BLOOD PRESSURE: 70 MMHG

## 2022-11-29 DIAGNOSIS — I10 ESSENTIAL HYPERTENSION, BENIGN: Primary | ICD-10-CM

## 2022-11-29 DIAGNOSIS — Z79.899 HIGH RISK MEDICATION USE: ICD-10-CM

## 2022-11-29 DIAGNOSIS — R00.1 BRADYCARDIA: ICD-10-CM

## 2022-11-29 DIAGNOSIS — C61 PROSTATE CANCER (HCC): ICD-10-CM

## 2022-11-29 DIAGNOSIS — I77.9 CAROTID ARTERY DISEASE, UNSPECIFIED LATERALITY, UNSPECIFIED TYPE (HCC): ICD-10-CM

## 2022-11-29 PROCEDURE — G8417 CALC BMI ABV UP PARAM F/U: HCPCS | Performed by: INTERNAL MEDICINE

## 2022-11-29 PROCEDURE — 93000 ELECTROCARDIOGRAM COMPLETE: CPT | Performed by: INTERNAL MEDICINE

## 2022-11-29 PROCEDURE — 99214 OFFICE O/P EST MOD 30 MIN: CPT | Performed by: INTERNAL MEDICINE

## 2022-11-29 PROCEDURE — G8536 NO DOC ELDER MAL SCRN: HCPCS | Performed by: INTERNAL MEDICINE

## 2022-11-29 PROCEDURE — 1101F PT FALLS ASSESS-DOCD LE1/YR: CPT | Performed by: INTERNAL MEDICINE

## 2022-11-29 PROCEDURE — G8432 DEP SCR NOT DOC, RNG: HCPCS | Performed by: INTERNAL MEDICINE

## 2022-11-29 PROCEDURE — G8427 DOCREV CUR MEDS BY ELIG CLIN: HCPCS | Performed by: INTERNAL MEDICINE

## 2022-11-29 NOTE — PROGRESS NOTES
Chief Complaint   Patient presents with    Follow-up     Pt states this is his 6mth check up     Patient Active Problem List    Diagnosis    Arthritis of knee, left    Prostate cancer ([facility]    Nodule of left lung    Coronary artery disease involving native coronary artery without angina pectoris    Carotid artery disease (HCC)    Pancreatic cyst    Sarcopenia    GERD (gastroesophageal reflux disease)    HLD (hyperlipidemia)    BPH (benign prostatic hyperplasia)    ED (erectile dysfunction)    Abnormal PSA    Rhinitis    Essential hypertension, benign     When seen last time about six months ago, he was bradycardic and there was a question whether he was in AFib. I reduced the dose of his diltiazem from 240 to 120 and he was advised to see Cardiology. He said he monitors his EKG with his device and notes that he is sinus rhythm all the time. He does have slow pulse rate in the high 40s. If he exercises, he can get it up 80 to 100. He has not seen any AFib. He is not taking any aspirin or any anticoagulants. He has had no TIA or neurological symptoms. He has had no chest pain, PND or orthopnea. Blood pressure control is generally 260 to 957S systolic, diastolic is 81K. He does okay on the 120, but he is still bradycardic. I advised at one point to see Cardiology, but he never made the appointment. His blood pressure today was okay. S1, S2 was regular with a rate of 48. Lungs were clear. Abdomen soft.     Review of Systems - General ROS: positive for  - fatigue  Hematological and Lymphatic ROS: negative  Respiratory ROS: no cough, shortness of breath, or wheezing  Cardiovascular ROS: no chest pain or dyspnea on exertion  negative for - loss of consciousness or rapid heart rate  Gastrointestinal ROS: positive for - gas/bloating  negative for - appetite loss, blood in stools, change in bowel habits, change in stools, constipation, diarrhea, heartburn, hematemesis, melena, nausea/vomiting, or stool incontinence  Genito-Urinary ROS: positive for - incontinence and nocturia  Musculoskeletal ROS: negative  positive for - muscle pain  Neurological ROS: no TIA or stroke symptoms  Dermatological ROS: positive for - dry skin and mole changes        Vitals:    11/29/22 1139 11/29/22 1157   BP: 121/67 (!) 142/70   Pulse: (!) 48 (!) 54   Resp: 19    Temp: 98.2 °F (36.8 °C)    TempSrc: Temporal    SpO2: 98%    Weight: 174 lb 6.4 oz (79.1 kg)      S1 and S2 normal, no murmurs, clicks, gallops or rubs. Regular rate and rhythm. Chest is clear; no wheezes or rales. No edema or JVD. The abdomen is soft without tenderness, guarding, mass, rebound or organomegaly. Bowel sounds are normal. No CVA tenderness or inguinal adenopathy noted. Lab Results   Component Value Date/Time    WBC 4.8 11/23/2022 08:32 AM    HGB 13.1 11/23/2022 08:32 AM    HCT 39.7 11/23/2022 08:32 AM    PLATELET 868 83/38/5197 08:32 AM     (H) 11/23/2022 08:32 AM     Lab Results   Component Value Date/Time    Hemoglobin A1c 5.5 05/05/2021 08:52 AM    Hemoglobin A1c 5.6 07/29/2019 12:13 PM    Hemoglobin A1c 5.7 (H) 05/11/2016 08:20 AM    Glucose 102 (H) 11/23/2022 08:32 AM    Microalbumin/Creat ratio (mg/g creat) 7 06/30/2010 08:37 AM    Microalb/Creat ratio (ug/mg creat.) 1.2 04/26/2011 12:00 AM    Microalbumin,urine random 0.99 06/30/2010 08:37 AM    LDL, calculated 42 11/23/2022 08:32 AM    LDL, calculated 55 04/30/2020 08:11 AM    Creatinine (POC) 0.8 07/31/2018 12:53 PM    Creatinine 1.08 11/23/2022 08:32 AM      Lab Results   Component Value Date/Time    Cholesterol, total 166 11/23/2022 08:32 AM    HDL Cholesterol 113 11/23/2022 08:32 AM    LDL, calculated 42 11/23/2022 08:32 AM    LDL, calculated 55 04/30/2020 08:11 AM    Triglyceride 54 11/23/2022 08:32 AM    CHOL/HDL Ratio 1.4 06/30/2010 08:37 AM     Lab Results   Component Value Date/Time    ALT (SGPT) 18 11/23/2022 08:32 AM    Alk.  phosphatase 60 11/23/2022 08:32 AM    Bilirubin, total 1.3 (H) 11/23/2022 08:32 AM    Albumin 4.6 11/23/2022 08:32 AM    Protein, total 7.5 11/23/2022 08:32 AM    INR 1.1 08/17/2019 03:57 AM    Prothrombin time 10.7 08/17/2019 03:57 AM    PLATELET 566 71/62/4917 08:32 AM       Lab Results   Component Value Date/Time    GFR est non-AA 64 10/08/2021 08:05 AM    GFRNA, POC >60 07/31/2018 12:53 PM    GFR est AA 74 10/08/2021 08:05 AM    GFRAA, POC >60 07/31/2018 12:53 PM    Creatinine 1.08 11/23/2022 08:32 AM    Creatinine (POC) 0.8 07/31/2018 12:53 PM    BUN 16 11/23/2022 08:32 AM    Sodium 143 11/23/2022 08:32 AM    Potassium 4.5 11/23/2022 08:32 AM    Chloride 105 11/23/2022 08:32 AM    CO2 26 11/23/2022 08:32 AM    Magnesium 2.4 (H) 11/23/2022 08:32 AM     Lab Results   Component Value Date/Time    Glucose 102 (H) 11/23/2022 08:32 AM    1. Carotid artery disease, unspecified laterality, unspecified type ([facility]  No tias    2. Prostate cancer ([facility]  Remission after rt with some GI issues    3. Bradycardia  Reviewed stopping dilt  worried about doing so advise see card in future can stop dilt is gets symptomatic bradycardia  - AMB POC EKG ROUTINE W/ 12 LEADS, INTER & REP    4. Essential hypertension, benign  Hypertension controlled for age based on guidelines  Lipid abnormality controlled      5. High risk medication use  Many     Reviewed labs    EKG: sinus bradycardia, 1st degree AV block.         Plan see card  See GI reference gassy bloating

## 2022-11-29 NOTE — PROGRESS NOTES
Chief Complaint   Patient presents with    Follow-up     Pt states this is his 6mth check up          Vitals:    11/29/22 1139   BP: 121/67   Pulse: (!) 48   Resp: 19   Temp: 98.2 °F (36.8 °C)   TempSrc: Temporal   SpO2: 98%   Weight: 174 lb 6.4 oz (79.1 kg)   PainSc:   0 - No pain       Current Outpatient Medications on File Prior to Visit   Medication Sig Dispense Refill    acetaminophen 500 mg tab 500 mg, diphenhydrAMINE 25 mg cap 25 mg Take  by mouth nightly as needed. tamsulosin (FLOMAX) 0.4 mg capsule TAKE 1 CAPSULE TWICE DAILY 180 Capsule 1    losartan (COZAAR) 50 mg tablet TAKE 1 TABLET EVERY DAY 90 Tablet 3    omeprazole (PRILOSEC) 20 mg capsule TAKE 1 CAPSULE EVERY DAY AS NEEDED FOR GASTROESOPHAGEAL REFLUX DISEASE 90 Capsule 3    atorvastatin (LIPITOR) 20 mg tablet TAKE 1 TABLET EVERY DAY 90 Tablet 3    dilTIAZem ER (CARDIZEM CD) 120 mg capsule Take 1 Capsule by mouth daily. 90 Capsule 1    hydroCHLOROthiazide (HYDRODIURIL) 25 mg tablet TAKE 1 TABLET EVERY DAY 90 Tablet 3    melatonin 1 mg tablet Take 2 mg by mouth nightly as needed (sleep). patient will not take melatonin if taking oxycodone      multivitamin (ONE A DAY) tablet Take 1 Tab by mouth daily. [DISCONTINUED] fluticasone propionate (FLONASE) 50 mcg/actuation nasal spray USE 2 SPRAYS IN EACH NOSTRIL EVERY DAY (Patient taking differently: as needed. USE 2 SPRAYS IN EACH NOSTRIL EVERY DAY) 48 g 3    [DISCONTINUED] diphenhydrAMINE (BENADRYL) 25 mg tablet Take 25 mg by mouth every six (6) hours as needed. (Patient not taking: Reported on 11/29/2022)      [DISCONTINUED] cholecalciferol (VITAMIN D3) (5000 Units/125 mcg) tab tablet Take 5,000 Units by mouth daily. (Patient not taking: Reported on 11/29/2022)       No current facility-administered medications on file prior to visit. Health Maintenance Due   Topic    COVID-19 Vaccine (5 - Booster for [employer] series)    Flu Vaccine (1)    Medicare Yearly Exam        1.  \"Have you been to the ER, urgent care clinic since your last visit? Hospitalized since your last visit? \" No    2. \"Have you seen or consulted any other health care providers outside of the [address] [place] [address] since your last visit? \" Yes Urologist Dr Fly Shaffer, Dermatologist Dr Maria Del Rosario Camacho    3. For patients aged 39-70: Has the patient had a colonoscopy / FIT/ Cologuard? Yes - no Care Gap present      If the patient is female:    4. For patients aged 41-77: Has the patient had a mammogram within the past 2 years? NA - based on age or sex      11. For patients aged 21-65: Has the patient had a pap smear?  NA - based on age or sex

## 2023-02-22 RX ORDER — FLUTICASONE PROPIONATE 50 MCG
SPRAY, SUSPENSION (ML) NASAL
Qty: 48 G | Refills: 3 | Status: SHIPPED | OUTPATIENT
Start: 2023-02-22

## 2023-02-22 RX ORDER — HYDROCHLOROTHIAZIDE 25 MG/1
TABLET ORAL
Qty: 90 TABLET | Refills: 3 | Status: SHIPPED | OUTPATIENT
Start: 2023-02-22

## 2023-02-22 RX ORDER — DILTIAZEM HYDROCHLORIDE 120 MG/1
CAPSULE, COATED, EXTENDED RELEASE ORAL
Qty: 90 CAPSULE | Refills: 1 | Status: SHIPPED | OUTPATIENT
Start: 2023-02-22

## 2023-03-28 DIAGNOSIS — I10 ESSENTIAL HYPERTENSION, BENIGN: ICD-10-CM

## 2023-03-28 DIAGNOSIS — R73.03 PREDIABETES: ICD-10-CM

## 2023-03-28 DIAGNOSIS — K86.2 PANCREATIC CYST: Primary | ICD-10-CM

## 2023-04-14 ENCOUNTER — HOSPITAL ENCOUNTER (OUTPATIENT)
Dept: CT IMAGING | Age: 88
Discharge: HOME | End: 2023-04-14
Attending: INTERNAL MEDICINE
Payer: MEDICARE

## 2023-04-14 DIAGNOSIS — K86.2 PANCREATIC CYST: ICD-10-CM

## 2023-04-14 PROCEDURE — 74176 CT ABD & PELVIS W/O CONTRAST: CPT

## 2023-04-25 ENCOUNTER — OFFICE VISIT (OUTPATIENT)
Dept: INTERNAL MEDICINE CLINIC | Age: 88
End: 2023-04-25
Payer: MEDICARE

## 2023-04-25 VITALS
HEIGHT: 69 IN | WEIGHT: 170.2 LBS | DIASTOLIC BLOOD PRESSURE: 70 MMHG | HEART RATE: 48 BPM | RESPIRATION RATE: 20 BRPM | OXYGEN SATURATION: 98 % | SYSTOLIC BLOOD PRESSURE: 149 MMHG | BODY MASS INDEX: 25.21 KG/M2 | TEMPERATURE: 98.2 F

## 2023-04-25 DIAGNOSIS — I77.9 CAROTID ARTERY DISEASE, UNSPECIFIED LATERALITY, UNSPECIFIED TYPE (HCC): ICD-10-CM

## 2023-04-25 DIAGNOSIS — K86.2 PANCREATIC CYST: ICD-10-CM

## 2023-04-25 DIAGNOSIS — R00.1 BRADYCARDIA: ICD-10-CM

## 2023-04-25 DIAGNOSIS — I48.91 ATRIAL FIBRILLATION WITH SLOW VENTRICULAR RESPONSE (HCC): Primary | ICD-10-CM

## 2023-04-25 DIAGNOSIS — Z13.39 SCREENING FOR ALCOHOLISM: ICD-10-CM

## 2023-04-25 DIAGNOSIS — I10 ESSENTIAL HYPERTENSION, BENIGN: ICD-10-CM

## 2023-04-25 DIAGNOSIS — C61 PROSTATE CANCER (HCC): ICD-10-CM

## 2023-04-25 DIAGNOSIS — K62.7 RADIATION PROCTITIS: ICD-10-CM

## 2023-04-25 DIAGNOSIS — Z00.00 MEDICARE ANNUAL WELLNESS VISIT, SUBSEQUENT: ICD-10-CM

## 2023-04-25 PROBLEM — M19.90 OSTEOARTHROSIS: Status: ACTIVE | Noted: 2023-04-25

## 2023-04-25 PROCEDURE — G0439 PPPS, SUBSEQ VISIT: HCPCS | Performed by: INTERNAL MEDICINE

## 2023-04-25 PROCEDURE — 1101F PT FALLS ASSESS-DOCD LE1/YR: CPT | Performed by: INTERNAL MEDICINE

## 2023-04-25 PROCEDURE — G0442 ANNUAL ALCOHOL SCREEN 15 MIN: HCPCS | Performed by: INTERNAL MEDICINE

## 2023-04-25 PROCEDURE — G8417 CALC BMI ABV UP PARAM F/U: HCPCS | Performed by: INTERNAL MEDICINE

## 2023-04-25 PROCEDURE — G8427 DOCREV CUR MEDS BY ELIG CLIN: HCPCS | Performed by: INTERNAL MEDICINE

## 2023-04-25 PROCEDURE — G8510 SCR DEP NEG, NO PLAN REQD: HCPCS | Performed by: INTERNAL MEDICINE

## 2023-04-25 PROCEDURE — G8536 NO DOC ELDER MAL SCRN: HCPCS | Performed by: INTERNAL MEDICINE

## 2023-04-25 PROCEDURE — 99214 OFFICE O/P EST MOD 30 MIN: CPT | Performed by: INTERNAL MEDICINE

## 2023-04-25 RX ORDER — WHEAT DEXTRIN 3 G/3.5 G
POWDER IN PACKET (EA) ORAL
COMMUNITY

## 2023-04-25 NOTE — PROGRESS NOTES
Patient here for AMW. Patient has been informed of any other discussion outside the parameters of the Medicare Wellness could result in other charges including a co pay, patient verbalized understanding. No chief complaint on file. Vitals:    04/25/23 1106   Temp: 98.2 °F (36.8 °C)   TempSrc: Temporal   Weight: 170 lb 3.2 oz (77.2 kg)   PainSc:   0 - No pain       Current Outpatient Medications on File Prior to Visit   Medication Sig Dispense Refill    dilTIAZem ER (CARDIZEM CD) 120 mg capsule TAKE 1 CAPSULE EVERY DAY 90 Capsule 1    hydroCHLOROthiazide (HYDRODIURIL) 25 mg tablet TAKE 1 TABLET EVERY DAY 90 Tablet 3    fluticasone propionate (FLONASE) 50 mcg/actuation nasal spray USE 2 SPRAYS IN EACH NOSTRIL EVERY DAY 48 g 3    acetaminophen 500 mg tab 500 mg, diphenhydrAMINE 25 mg cap 25 mg Take  by mouth nightly as needed. tamsulosin (FLOMAX) 0.4 mg capsule TAKE 1 CAPSULE TWICE DAILY 180 Capsule 1    losartan (COZAAR) 50 mg tablet TAKE 1 TABLET EVERY DAY 90 Tablet 3    omeprazole (PRILOSEC) 20 mg capsule TAKE 1 CAPSULE EVERY DAY AS NEEDED FOR GASTROESOPHAGEAL REFLUX DISEASE 90 Capsule 3    atorvastatin (LIPITOR) 20 mg tablet TAKE 1 TABLET EVERY DAY 90 Tablet 3    melatonin 1 mg tablet Take 2 mg by mouth nightly as needed (sleep). patient will not take melatonin if taking oxycodone      multivitamin (ONE A DAY) tablet Take 1 Tab by mouth daily. No current facility-administered medications on file prior to visit. Health Maintenance Due   Topic    COVID-19 Vaccine (5 - Booster for [employer] series)    Medicare Yearly Exam        1. \"Have you been to the ER, urgent care clinic since your last visit? Hospitalized since your last visit? \" No    2. \"Have you seen or consulted any other health care providers outside of the [address] [place] [address] since your last visit? \" No     3. For patients aged 39-70: Has the patient had a colonoscopy / FIT/ Cologuard?  No      If the patient is female:    4. For patients aged 41-77: Has the patient had a mammogram within the past 2 years? NA - based on age or sex      11. For patients aged 21-65: Has the patient had a pap smear?  NA - based on age or sex

## 2023-04-26 NOTE — PATIENT INSTRUCTIONS
Medicare Wellness Visit, Male    The best way to live healthy is to have a lifestyle where you eat a well-balanced diet, exercise regularly, limit alcohol use, and quit all forms of tobacco/nicotine, if applicable. Regular preventive services are another way to keep healthy. Preventive services (vaccines, screening tests, monitoring & exams) can help personalize your care plan, which helps you manage your own care. Screening tests can find health problems at the earliest stages, when they are easiest to treat. [facility] follows the current, evidence-based guidelines published by the [facility] ([facility]) when recommending preventive services for our patients. Because we follow these guidelines, sometimes recommendations change over time as research supports it. (For example, a prostate screening blood test is no longer routinely recommended for men with no symptoms). Of course, you and your doctor may decide to screen more often for some diseases, based on your risk and co-morbidities (chronic disease you are already diagnosed with). Preventive services for you include:  - Medicare offers their members a free annual wellness visit, which is time for you and your primary care provider to discuss and plan for your preventive service needs.  Take advantage of this benefit every year!    -Over the age of 72 should receive the recommended pneumonia vaccines.   -All adults should have a flu vaccine yearly.  -All adults should receive a tetanus vaccine every 10 years.  -Over the age of 48 should receive the shingles vaccines.    -All adults should be screened once for Hepatitis C.  -All adults age 38-68 who are overweight should have a diabetes screening test once every three years.   -Other screening tests & preventive services for persons with diabetes include: an eye exam to screen for diabetic retinopathy, a kidney function test, a foot exam, and stricter control over your cholesterol.   -Cardiovascular screening for adults with routine risk involves an electrocardiogram (ECG) at intervals determined by the provider.     -Colorectal cancer screening should be done for adults age 43-69 with no increased risk factors for colorectal cancer. There are a number of acceptable methods of screening for this type of cancer. Each test has its own benefits and drawbacks. Discuss with your provider what is most appropriate for you during your annual wellness visit. The different tests include: colonoscopy (considered the best screening method), a fecal occult blood test, a fecal DNA test, and sigmoidoscopy.    -Lung cancer screening is recommended annually with a low dose CT scan for adults between age 54 and 68, who have smoked at least 30 pack years (equivalent of 1 pack per day for 30 days), and who is a current smoker or quit less than 15 years ago. -An Abdominal Aortic Aneurysm (AAA) Screening is recommended for men age 73-68 who has ever smoked in their lifetime.      Here is a list of your current Health Maintenance items (your personalized list of preventive services) with a due date:  Health Maintenance Due   Topic Date Due    COVID-19 Vaccine (5 - Booster for Good Peter series) 06/09/2022

## 2023-04-26 NOTE — PROGRESS NOTES
This is the Subsequent Medicare Annual Wellness Exam, performed 12 months or more after the Initial AWV or the last Subsequent AWV    I have reviewed the patient's medical history in detail and updated the computerized patient record. Assessment/Plan   Education and counseling provided:  Are appropriate based on today's review and evaluation  Pneumococcal Vaccine  Influenza Vaccine  Colorectal cancer screening tests    1. Atrial fibrillation with slow ventricular response (HCC)  Assessment & Plan:   at goal, continue current medications  2. Radiation proctitis  3. Carotid artery disease, unspecified laterality, unspecified type ([facility]  4. [facility]  5. Medicare annual wellness visit, subsequent  6.  Screening for alcoholism  -     [place] ANNUAL ALCOHOL SCREEN 15 MIN       Depression Risk Factor Screening     3 most recent PHQ Screens 4/25/2023   Little interest or pleasure in doing things Not at all   Feeling down, depressed, irritable, or hopeless Not at all   Total Score PHQ 2 0   Trouble falling or staying asleep, or sleeping too much Not at all   Feeling tired or having little energy Not at all   Poor appetite, weight loss, or overeating Not at all   Feeling bad about yourself - or that you are a failure or have let yourself or your family down Not at all   Trouble concentrating on things such as school, work, reading, or watching TV Not at all   Moving or speaking so slowly that other people could have noticed; or the opposite being so fidgety that others notice Not at all   Thoughts of being better off dead, or hurting yourself in some way Not at all   PHQ 9 Score 0   How difficult have these problems made it for you to do your work, take care of your home and get along with others Not difficult at all       Alcohol & Drug Abuse Risk Screen   Do you average more than 1 drink per night or more than 7 drinks a week?: (P) Yes  In the past three months have you had more than 4 drinks containing alcohol on one occasion?: (P) No          Alcohol consumption was discussed as a risk factor for falling and  Brain health, advised to reduce drinking to  Normal guidelines as he does at least 2 per day  Discussion ensued  8 minutes spent on this topic    Functional Ability and Level of Safety   Hearing:  Hearing: (P) additional comments below  Hearing comments: (P) Little hard of hearing      Activities of Daily Living: The home contains: (P) grab bars, rugs  Functional ADLs: (P) Patient does total self care     Ambulation:  Patient ambulates: (P) with no difficulty     Fall Risk:  Fall Risk Assessment, last 12 mths 4/25/2023   Able to walk? Yes   Fall in past 12 months? 0   Do you feel unsteady?  0   Are you worried about falling 0     Abuse Screen:  Do you ever feel afraid of your partner?: (P) No  Are you in a relationship with someone who physically or mentally threatens you?: (P) No  Is it safe for you to go home?: (P) Yes        Cognitive Screening   Has your family/caregiver stated any concerns about your memory?: (P) Additional comments below  Additional comments about memory: (P) Occasional ly     Cognitive Screening: Normal - Verbal Fluency Test    Health Maintenance Due     Health Maintenance Due   Topic Date Due    COVID-19 Vaccine (5 - Booster for Pfizer series) 06/09/2022       Patient Care Team   Patient Care Team:  Erasmo Jacques MD as PCP - General  Erasmo Jacques MD as PCP - [facility] Provider  Maricruz Lee MD (Cardiovascular Disease Physician)    History     Patient Active Problem List   Diagnosis Code    Essential hypertension, benign I10    Rhinitis J31.0    ED (erectile dysfunction) N52.9    Abnormal PSA R97.20    HLD (hyperlipidemia) E78.5    BPH (benign prostatic hyperplasia) N40.0    GERD (gastroesophageal reflux disease) K21.9    Sarcopenia M62.84    Pancreatic cyst K86.2    Carotid artery disease ([facility] I77.9    Coronary artery disease involving native coronary artery without angina pectoris I25.10    Nodule of left lung R91.1    Prostate cancer ([facility] C61    Arthritis of knee, left M17.12    Osteoarthrosis M19.90    Radiation proctitis K62.7    Atrial fibrillation with slow ventricular response (HCC) I48.91     Past Medical History:   Diagnosis Date    Abnormal PSA 05/09/2011    Arrhythmia     OCCASIONAL IRREGULAR BEAT    BPH (benign prostatic hyperplasia)     CAD (coronary artery disease)     CALCIUM BUILD-UP NOTED    Cancer ([facility] 04/2019    PROSTATE    Carotid artery disease ([facility] 04/14/2014    ED (erectile dysfunction)     GERD (gastroesophageal reflux disease) 5/21/2012    Hypercholesterolemia     Hypertension     Pancreatic cyst 10/21/2013    Rhinitis 8/23/2010    Sarcopenia 7/15/2013      Past Surgical History:   Procedure Laterality Date    ENDOSCOPY, COLON, DIAGNOSTIC  2012    HX CATARACT REMOVAL Bilateral     HX HEMORRHOIDECTOMY      HX HERNIA REPAIR      HX KNEE ARTHROSCOPY      HX KNEE REPLACEMENT  08/2019    TKR    HX MOHS PROCEDURES      HX TONSILLECTOMY      AGE 12 YEARS    ME UNLISTED PROCEDURE CARDIAC SURGERY  1995    CARDIAC CATHETERIZATION     Current Outpatient Medications   Medication Sig Dispense Refill    Wheat Dextrin (Benefiber Clear) 3 gram/3.5 gram pwpk Take  by mouth. dilTIAZem ER (CARDIZEM CD) 120 mg capsule TAKE 1 CAPSULE EVERY DAY 90 Capsule 1    hydroCHLOROthiazide (HYDRODIURIL) 25 mg tablet TAKE 1 TABLET EVERY DAY 90 Tablet 3    fluticasone propionate (FLONASE) 50 mcg/actuation nasal spray USE 2 SPRAYS IN EACH NOSTRIL EVERY DAY 48 g 3    acetaminophen 500 mg tab 500 mg, diphenhydrAMINE 25 mg cap 25 mg Take  by mouth nightly as needed.       tamsulosin (FLOMAX) 0.4 mg capsule TAKE 1 CAPSULE TWICE DAILY 180 Capsule 1    losartan (COZAAR) 50 mg tablet TAKE 1 TABLET EVERY DAY 90 Tablet 3    omeprazole (PRILOSEC) 20 mg capsule TAKE 1 CAPSULE EVERY DAY AS NEEDED FOR GASTROESOPHAGEAL REFLUX DISEASE 90 Capsule 3    atorvastatin (LIPITOR) 20 mg tablet TAKE 1 TABLET EVERY DAY 90 Tablet 3    melatonin 1 mg tablet Take 2 Tablets by mouth nightly as needed (sleep). patient will not take melatonin if taking oxycodone      multivitamin (ONE A DAY) tablet Take 1 Tablet by mouth daily. Allergies   Allergen Reactions    Zantac [Ranitidine Hcl] Rash       Family History   Problem Relation Age of Onset    Alzheimer's Disease Mother     OSTEOARTHRITIS Mother     Heart Attack Father         MI    Heart Disease Father      Social History     Tobacco Use    Smoking status: Former     Packs/day: 1.00     Types: Cigarettes     Quit date: 1965     Years since quittin.7    Smokeless tobacco: Never   Substance Use Topics    Alcohol use: Yes     Alcohol/week: 14.0 standard drinks     Types: 14 Shots of liquor per week     Chief Complaint   Patient presents with    Annual Wellness Visit     Patient Active Problem List    Diagnosis    Osteoarthrosis    Radiation proctitis     See DR Al Hendrix      Atrial fibrillation with slow ventricular response (HCC)    Arthritis of knee, left    Prostate cancer ([facility]    Nodule of left lung    Coronary artery disease involving native coronary artery without angina pectoris    Carotid artery disease (HCC)    Pancreatic cyst    Sarcopenia    GERD (gastroesophageal reflux disease)    HLD (hyperlipidemia)    BPH (benign prostatic hyperplasia)    ED (erectile dysfunction)    Abnormal PSA    Rhinitis    Essential hypertension, benign     When seen last time about 12months ago, he was bradycardic and there was a question whether he was in AFib. I reduced the dose of his diltiazem from 240 to 120 and he was advised to see Cardiology. He said he monitors his EKG with his device and notes that he is sinus rhythm all the time. He does have slow pulse rate in the high 40s. If he exercises, he can get it up 80 to 100. He has not seen any AFib. He is not taking any aspirin or any anticoagulants. He has had no TIA or neurological symptoms.   He has had no chest pain, PND or orthopnea. No lightheaded spells    Has not had any syncope    Blood pressure control is generally 032 to 935M systolic, diastolic is 35C. He does okay on the 120, but he is still bradycardic. I advised at one point to see Cardiology, but he never made the appointment. His blood pressure today was okay. S1, S2 was regular with a rate of 48. Lungs were clear. Abdomen soft. Also complains of rectal symptoms. He said that he saw  [employer] from [facility] and had a flexible sigmoid in the office. I do not think I have a note. They told him that he has radiation proctitis and some radiation issues. They advised a high-fiber diet taking Benefiber. They have advised using Gas-x and they did not offer any other big treatment. He has an appointment for follow up with them in the middle of May. He complains of a lot of rectal gas. He complains of no rectal bleeding, complains of inadequate bowel size. He says his stools are soft or small and he does not seem to empty completely. He has had no change in weight or appetite. He has no upper GI symptoms. In addition, he has had a CT scan done a couple of weeks ago that showed a stable pancreatic cyst with no change on the CT of that area. This was not done with contrast.  He otherwise had a normal CT abdomen and pelvis which was reassuring.       Review of Systems - General ROS: positive for  - fatigue  Hematological and Lymphatic ROS: negative  Respiratory ROS: no cough, shortness of breath, or wheezing  Cardiovascular ROS: no chest pain or dyspnea on exertion  negative for - loss of consciousness or rapid heart rate  Gastrointestinal ROS: positive for - gas/bloating  negative for - appetite loss, blood in stools, change in bowel habits, change in stools, constipation, diarrhea, heartburn, hematemesis, melena, nausea/vomiting, or stool incontinence  Genito-Urinary ROS: positive for - incontinence and nocturia  Musculoskeletal ROS: negative  positive for - muscle pain  Neurological ROS: no TIA or stroke symptoms  Dermatological ROS: positive for - dry skin and mole changes        Vitals:    04/25/23 1106   BP: (!) 149/70   Pulse: (!) 48   Resp: 20   Temp: 98.2 °F (36.8 °C)   TempSrc: Temporal   SpO2: 98%   Weight: 170 lb 3.2 oz (77.2 kg)   Height: 5' 9\" (1.753 m)     S1 and S2 normal, no murmurs, clicks, gallops or rubs. Regular rate and rhythm. Chest is clear; no wheezes or rales. No edema or JVD. The abdomen is soft without tenderness, guarding, mass, rebound or organomegaly. Bowel sounds are normal. No CVA tenderness or inguinal adenopathy noted. neuro normal    Lab Results   Component Value Date/Time    WBC 5.5 04/11/2023 08:27 AM    HGB 13.2 04/11/2023 08:27 AM    HCT 40.2 04/11/2023 08:27 AM    PLATELET 527 10/20/3326 08:27 AM     (H) 04/11/2023 08:27 AM     Lab Results   Component Value Date/Time    Hemoglobin A1c 5.5 05/05/2021 08:52 AM    Hemoglobin A1c 5.6 07/29/2019 12:13 PM    Hemoglobin A1c 5.7 (H) 05/11/2016 08:20 AM    Glucose 103 (H) 04/11/2023 08:27 AM    Microalbumin/Creat ratio (mg/g creat) 7 06/30/2010 08:37 AM    Microalb/Creat ratio (ug/mg creat.) 1.2 04/26/2011 12:00 AM    Microalbumin,urine random 0.99 06/30/2010 08:37 AM    LDL, calculated 50 04/11/2023 08:27 AM    LDL, calculated 55 04/30/2020 08:11 AM    Creatinine (POC) 0.8 07/31/2018 12:53 PM    Creatinine 1.03 04/11/2023 08:27 AM      Lab Results   Component Value Date/Time    Cholesterol, total 174 04/11/2023 08:27 AM    HDL Cholesterol 112 04/11/2023 08:27 AM    LDL, calculated 50 04/11/2023 08:27 AM    LDL, calculated 55 04/30/2020 08:11 AM    Triglyceride 61 04/11/2023 08:27 AM    CHOL/HDL Ratio 1.4 06/30/2010 08:37 AM     Lab Results   Component Value Date/Time    ALT (SGPT) 19 04/11/2023 08:27 AM    Alk.  phosphatase 62 04/11/2023 08:27 AM    Bilirubin, total 1.2 04/11/2023 08:27 AM    Albumin 4.6 04/11/2023 08:27 AM Protein, total 7.3 04/11/2023 08:27 AM    INR 1.1 08/17/2019 03:57 AM    Prothrombin time 10.7 08/17/2019 03:57 AM    PLATELET 402 26/01/9506 08:27 AM       Lab Results   Component Value Date/Time    GFR est non-AA 64 10/08/2021 08:05 AM    GFRNA, POC >60 07/31/2018 12:53 PM    GFR est AA 74 10/08/2021 08:05 AM    GFRAA, POC >60 07/31/2018 12:53 PM    Creatinine 1.03 04/11/2023 08:27 AM    Creatinine (POC) 0.8 07/31/2018 12:53 PM    BUN 18 04/11/2023 08:27 AM    Sodium 146 (H) 04/11/2023 08:27 AM    Potassium 5.0 04/11/2023 08:27 AM    Chloride 106 04/11/2023 08:27 AM    CO2 26 04/11/2023 08:27 AM    Magnesium 2.3 04/11/2023 08:27 AM   CT Results (most recent):  Results from Hospital Encounter encounter on 04/14/23    CT ABD PELV WO CONT    Narrative  EXAM: CT ABD PELV WO CONT    INDICATION: Follow-up pancreatic cyst    COMPARISON: 5/11/2022    IV CONTRAST: None. ORAL CONTRAST: None    TECHNIQUE:  Thin axial images were obtained through the abdomen and pelvis. Coronal and  sagittal reformats were generated. CT dose reduction was achieved through use of  a standardized protocol tailored for this examination and automatic exposure  control for dose modulation. The absence of intravenous contrast material reduces the sensitivity for  evaluation of the vasculature and solid organs. FINDINGS:  LOWER THORAX: No significant abnormality in the incidentally imaged lower chest.  LIVER: No mass. BILIARY TREE: Gallbladder is within normal limits. CBD is not dilated. SPLEEN: within normal limits. PANCREAS: 14 mm cyst is seen in the body of the pancreas and 13 mm cyst in the  pancreatic head, both unchanged compared to the prior exam, best evaluated on  coronal imaging. ADRENALS: Unremarkable. KIDNEYS/URETERS: Left renal cysts again noted, no follow-up required. Punctate  nonobstructing right renal stones. STOMACH: Unremarkable. SMALL BOWEL: No dilatation or wall thickening.   COLON: Sigmoid diverticulosis. APPENDIX: Within normal limits. PERITONEUM: No ascites or pneumoperitoneum. RETROPERITONEUM: No lymphadenopathy or aortic aneurysm. REPRODUCTIVE ORGANS: Prostate enlargement again demonstrated. URINARY BLADDER: No mass or calculus. BONES: Degenerative changes are seen in the lumbar spine and hip joints  bilaterally. 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 ABDOMINAL WALL: Evidence of lower abdominal wall hernia repair. ADDITIONAL COMMENTS: N/A    Impression  Stable 2 small cystic lesions in the pancreas. Nonobstructing right renal  stones. Sigmoid diverticulosis. No acute abnormality. Lab Results   Component Value Date/Time    Glucose 103 (H) 04/11/2023 08:27 AM   reviewed labs    EKG: sinus bradycardia, 1st degree AV block. Plan see card  1. Radiation proctitis  Seeing colorectal again soon    2. Atrial fibrillation with slow ventricular response (HCC)  No a fib seen on  strips    3. Carotid artery disease, unspecified laterality, unspecified type (HCC)  mild    4. Prostate cancer ([facility]  Remission seeing gu soon    5. Medicare annual wellness visit, subsequent  reviewed    6. Screening for alcoholism    - [place] ANNUAL ALCOHOL SCREEN 15 MIN    7.  Bradycardia  Told him ok since can get HR > 100 on tradmill  Advise once again he can see card      Benny Pappas MD

## 2023-09-23 RX ORDER — ATORVASTATIN CALCIUM 20 MG/1
TABLET, FILM COATED ORAL
Qty: 90 TABLET | Refills: 3 | Status: SHIPPED | OUTPATIENT
Start: 2023-09-23

## 2023-10-18 ENCOUNTER — PATIENT MESSAGE (OUTPATIENT)
Age: 88
End: 2023-10-18

## 2023-10-18 DIAGNOSIS — I10 ESSENTIAL (PRIMARY) HYPERTENSION: ICD-10-CM

## 2023-10-18 DIAGNOSIS — K86.2 CYST OF PANCREAS: ICD-10-CM

## 2023-10-18 DIAGNOSIS — I48.20 CHRONIC ATRIAL FIBRILLATION (HCC): Primary | ICD-10-CM

## 2023-10-21 NOTE — TELEPHONE ENCOUNTER
From: Betsy Cuba  To: Dr. Elo Li: 10/18/2023 2:06 PM EDT  Subject: Visit 2023    Prior to this visit I would like to get a current CT Abdomen scan and have lab work done. Please forward to me and [facility] where previous scan was completed.     Thank you   Yaneth HOYOS 1934

## 2023-10-26 DIAGNOSIS — I10 ESSENTIAL (PRIMARY) HYPERTENSION: ICD-10-CM

## 2023-10-27 LAB
ALBUMIN SERPL-MCNC: 3.9 G/DL (ref 3.5–5)
ALBUMIN/GLOB SERPL: 1.2 (ref 1.1–2.2)
ALP SERPL-CCNC: 56 U/L (ref 45–117)
ALT SERPL-CCNC: 24 U/L (ref 12–78)
ANION GAP SERPL CALC-SCNC: 3 MMOL/L (ref 5–15)
AST SERPL-CCNC: 24 U/L (ref 15–37)
BASOPHILS # BLD: 0 K/UL (ref 0–0.1)
BASOPHILS NFR BLD: 1 % (ref 0–1)
BILIRUB SERPL-MCNC: 1.2 MG/DL (ref 0.2–1)
BUN SERPL-MCNC: 21 MG/DL (ref 6–20)
BUN/CREAT SERPL: 22 (ref 12–20)
CALCIUM SERPL-MCNC: 9.1 MG/DL (ref 8.5–10.1)
CHLORIDE SERPL-SCNC: 110 MMOL/L (ref 97–108)
CHOLEST SERPL-MCNC: 166 MG/DL
CO2 SERPL-SCNC: 29 MMOL/L (ref 21–32)
CREAT SERPL-MCNC: 0.96 MG/DL (ref 0.7–1.3)
DIFFERENTIAL METHOD BLD: ABNORMAL
EOSINOPHIL # BLD: 0.2 K/UL (ref 0–0.4)
EOSINOPHIL NFR BLD: 3 % (ref 0–7)
ERYTHROCYTE [DISTWIDTH] IN BLOOD BY AUTOMATED COUNT: 14 % (ref 11.5–14.5)
GLOBULIN SER CALC-MCNC: 3.2 G/DL (ref 2–4)
GLUCOSE SERPL-MCNC: 105 MG/DL (ref 65–100)
HCT VFR BLD AUTO: 40.7 % (ref 36.6–50.3)
HDLC SERPL-MCNC: 121 MG/DL
HDLC SERPL: 1.4 (ref 0–5)
HGB BLD-MCNC: 13 G/DL (ref 12.1–17)
IMM GRANULOCYTES # BLD AUTO: 0 K/UL (ref 0–0.04)
IMM GRANULOCYTES NFR BLD AUTO: 0 % (ref 0–0.5)
LDLC SERPL CALC-MCNC: 34.2 MG/DL (ref 0–100)
LYMPHOCYTES # BLD: 1.7 K/UL (ref 0.8–3.5)
LYMPHOCYTES NFR BLD: 36 % (ref 12–49)
MCH RBC QN AUTO: 33.7 PG (ref 26–34)
MCHC RBC AUTO-ENTMCNC: 31.9 G/DL (ref 30–36.5)
MCV RBC AUTO: 105.4 FL (ref 80–99)
MONOCYTES # BLD: 0.7 K/UL (ref 0–1)
MONOCYTES NFR BLD: 14 % (ref 5–13)
NEUTS SEG # BLD: 2.2 K/UL (ref 1.8–8)
NEUTS SEG NFR BLD: 46 % (ref 32–75)
NRBC # BLD: 0 K/UL (ref 0–0.01)
NRBC BLD-RTO: 0 PER 100 WBC
PLATELET # BLD AUTO: 128 K/UL (ref 150–400)
POTASSIUM SERPL-SCNC: 4.8 MMOL/L (ref 3.5–5.1)
PROT SERPL-MCNC: 7.1 G/DL (ref 6.4–8.2)
RBC # BLD AUTO: 3.86 M/UL (ref 4.1–5.7)
SODIUM SERPL-SCNC: 142 MMOL/L (ref 136–145)
TRIGL SERPL-MCNC: 54 MG/DL
VLDLC SERPL CALC-MCNC: 10.8 MG/DL
WBC # BLD AUTO: 4.8 K/UL (ref 4.1–11.1)

## 2023-10-31 ENCOUNTER — HOSPITAL ENCOUNTER (OUTPATIENT)
Facility: HOSPITAL | Age: 88
Discharge: HOME | End: 2023-11-03
Attending: INTERNAL MEDICINE
Payer: MEDICARE

## 2023-10-31 DIAGNOSIS — K86.2 CYST OF PANCREAS: ICD-10-CM

## 2023-10-31 PROCEDURE — 74176 CT ABD & PELVIS W/O CONTRAST: CPT

## 2023-11-02 ENCOUNTER — OFFICE VISIT (OUTPATIENT)
Age: 88
End: 2023-11-02
Payer: MEDICARE

## 2023-11-02 VITALS
DIASTOLIC BLOOD PRESSURE: 82 MMHG | RESPIRATION RATE: 16 BRPM | BODY MASS INDEX: 23.7 KG/M2 | WEIGHT: 160 LBS | HEIGHT: 69 IN | SYSTOLIC BLOOD PRESSURE: 159 MMHG | OXYGEN SATURATION: 94 % | TEMPERATURE: 96.9 F | HEART RATE: 51 BPM

## 2023-11-02 DIAGNOSIS — Z79.899 OTHER LONG TERM (CURRENT) DRUG THERAPY: ICD-10-CM

## 2023-11-02 DIAGNOSIS — C61 PROSTATE CANCER (HCC): ICD-10-CM

## 2023-11-02 DIAGNOSIS — R00.1 BRADYCARDIA, UNSPECIFIED: ICD-10-CM

## 2023-11-02 DIAGNOSIS — I10 ESSENTIAL HYPERTENSION, BENIGN: Primary | ICD-10-CM

## 2023-11-02 DIAGNOSIS — K86.2 PANCREATIC CYST: ICD-10-CM

## 2023-11-02 PROCEDURE — G8484 FLU IMMUNIZE NO ADMIN: HCPCS | Performed by: INTERNAL MEDICINE

## 2023-11-02 PROCEDURE — G8420 CALC BMI NORM PARAMETERS: HCPCS | Performed by: INTERNAL MEDICINE

## 2023-11-02 PROCEDURE — 99214 OFFICE O/P EST MOD 30 MIN: CPT | Performed by: INTERNAL MEDICINE

## 2023-11-02 PROCEDURE — 1123F ACP DISCUSS/DSCN MKR DOCD: CPT | Performed by: INTERNAL MEDICINE

## 2023-11-02 PROCEDURE — 1036F TOBACCO NON-USER: CPT | Performed by: INTERNAL MEDICINE

## 2023-11-02 PROCEDURE — G8427 DOCREV CUR MEDS BY ELIG CLIN: HCPCS | Performed by: INTERNAL MEDICINE

## 2023-11-02 RX ORDER — VITAMIN E 268 MG
400 CAPSULE ORAL DAILY
COMMUNITY

## 2023-11-02 RX ORDER — PHENOL 1.4 %
AEROSOL, SPRAY (ML) MUCOUS MEMBRANE
COMMUNITY

## 2023-11-03 RX ORDER — HYDROCHLOROTHIAZIDE 25 MG/1
25 TABLET ORAL DAILY
Qty: 90 TABLET | Refills: 1 | Status: SHIPPED | OUTPATIENT
Start: 2023-11-03

## 2023-11-03 NOTE — PROGRESS NOTES
Chief Complaint   Patient presents with    Follow-up     Pt states this is his 6mth check up     Patient Active Problem List    Diagnosis    Arthritis of knee, left    Prostate cancer ([address])    Nodule of left lung    Coronary artery disease involving native coronary artery without angina pectoris    Carotid artery disease (HCC)    Pancreatic cyst    Sarcopenia    GERD (gastroesophageal reflux disease)    HLD (hyperlipidemia)    BPH (benign prostatic hyperplasia)    ED (erectile dysfunction)    Abnormal PSA    Rhinitis    Essential hypertension, benign     When seen last time about six months ago, he was bradycardic and there was a question whether he was in AFib. I reduced the dose of his diltiazem from 240 to 120 and he was advised to see Cardiology. He said he monitors his EKG with his device and notes that he is sinus rhythm all the time. He does have slow pulse rate in the high 40s. If he exercises, he can get it up 80 to 100. He has not seen any AFib. He is not taking any aspirin or any anticoagulants. He has had no TIA or neurological symptoms. He has had no chest pain, PND or orthopnea. Blood pressure control is generally 855 to 235Y systolic, diastolic is 86G. He does okay on the 120, but he is still bradycardic. I advised at one point to see Cardiology, but he never made the appointment. His blood pressure today was okay. S1, S2 was regular with a rate of 48. Lungs were clear. Abdomen soft.   Has an abnormal pancreas  with cysts    Has gi issues attributed to radiation change  Review of Systems - General ROS: positive for  - fatigue  Hematological and Lymphatic ROS: negative  Respiratory ROS: no cough, shortness of breath, or wheezing  Cardiovascular ROS: no chest pain or dyspnea on exertion  negative for - loss of consciousness or rapid heart rate  Gastrointestinal ROS: positive for - gas/bloating   no bleeding  negative for - appetite loss, blood in stools, change in bowel habits,

## 2023-11-13 NOTE — TELEPHONE ENCOUNTER
Attempted to call pt again to schedule NP appt with Dr. Yvrose Diaz, pt currently has covid and wants and appt before 7/21 which is the next available. Offered 820 on 6/16 and he is unable to make that appt time.  So still unable to schedule MEDICATIONS  (STANDING):  ALPRAZolam 0.25 milliGRAM(s) Oral every 12 hours  ascorbic acid 500 milliGRAM(s) Oral two times a day  aspirin enteric coated 81 milliGRAM(s) Oral daily  atorvastatin 80 milliGRAM(s) Oral at bedtime  bisacodyl Suppository 10 milliGRAM(s) Rectal once  chlorhexidine 2% Cloths 1 Application(s) Topical daily  dextrose 50% Injectable 50 milliLiter(s) IV Push every 15 minutes  dextrose 50% Injectable 25 milliLiter(s) IV Push every 15 minutes  heparin   Injectable 5000 Unit(s) SubCutaneous every 8 hours  insulin glargine Injectable (LANTUS) 5 Unit(s) SubCutaneous at bedtime  insulin lispro (ADMELOG) corrective regimen sliding scale   SubCutaneous Before meals and at bedtime  metoprolol tartrate 25 milliGRAM(s) Oral two times a day  pantoprazole    Tablet 40 milliGRAM(s) Oral before breakfast  polyethylene glycol 3350 17 Gram(s) Oral daily  senna 2 Tablet(s) Oral at bedtime  sodium chloride 0.9% lock flush 3 milliLiter(s) IV Push every 8 hours  sodium chloride 0.9%. 1000 milliLiter(s) (10 mL/Hr) IV Continuous <Continuous>    MEDICATIONS  (PRN):  acetaminophen     Tablet .. 650 milliGRAM(s) Oral every 6 hours PRN Mild Pain (1 - 3)  oxyCODONE    IR 5 milliGRAM(s) Oral every 4 hours PRN Moderate Pain (4 - 6)

## 2024-01-31 RX ORDER — OMEPRAZOLE 20 MG/1
CAPSULE, DELAYED RELEASE ORAL
Qty: 90 CAPSULE | Refills: 3 | Status: SHIPPED | OUTPATIENT
Start: 2024-01-31

## 2024-01-31 RX ORDER — LOSARTAN POTASSIUM 50 MG/1
TABLET ORAL
Qty: 90 TABLET | Refills: 3 | Status: SHIPPED | OUTPATIENT
Start: 2024-01-31

## 2024-01-31 RX ORDER — DILTIAZEM HYDROCHLORIDE 120 MG/1
120 CAPSULE, COATED, EXTENDED RELEASE ORAL DAILY
Qty: 90 CAPSULE | Refills: 3 | Status: SHIPPED | OUTPATIENT
Start: 2024-01-31

## 2024-01-31 RX ORDER — TAMSULOSIN HYDROCHLORIDE 0.4 MG/1
CAPSULE ORAL
Qty: 180 CAPSULE | Refills: 3 | Status: SHIPPED | OUTPATIENT
Start: 2024-01-31

## 2024-03-20 ENCOUNTER — TELEPHONE (OUTPATIENT)
Age: 89
End: 2024-03-20

## 2024-03-20 DIAGNOSIS — K86.2 PANCREATIC CYST: ICD-10-CM

## 2024-03-20 DIAGNOSIS — I25.10 CORONARY ARTERY DISEASE INVOLVING NATIVE CORONARY ARTERY OF NATIVE HEART WITHOUT ANGINA PECTORIS: ICD-10-CM

## 2024-03-20 DIAGNOSIS — I10 ESSENTIAL HYPERTENSION, BENIGN: Primary | ICD-10-CM

## 2024-03-20 DIAGNOSIS — R73.03 PREDIABETES: ICD-10-CM

## 2024-03-20 NOTE — TELEPHONE ENCOUNTER
----- Message from Samantha Hammond sent at 3/18/2024  2:10 PM EDT -----  Subject: Message to Provider    QUESTIONS  Information for Provider? Place labs for upcoming visit on 5/2 and for the   CT scan please place orders and call patient when ready.  ---------------------------------------------------------------------------  --------------  CALL BACK INFO  [phone]; OK to leave message on voicemail  ---------------------------------------------------------------------------  --------------  SCRIPT ANSWERS  Relationship to Patient? Spouse/Partner  Representative Name? guy  Is the representative on the Communication Release of Information (ODALIS)   form in Epic? Yes

## 2024-03-21 ENCOUNTER — PATIENT MESSAGE (OUTPATIENT)
Age: 89
End: 2024-03-21

## 2024-03-21 DIAGNOSIS — K86.2 CYST OF PANCREAS: Primary | ICD-10-CM

## 2024-03-26 NOTE — TELEPHONE ENCOUNTER
From: Dr. Fco Mobley  To: Oswaldo Vogt  Sent: 3/21/2024 3:57 PM EDT  Subject: labs    Labs ordered     Too soon for a CT follow

## 2024-04-22 ENCOUNTER — HOSPITAL ENCOUNTER (OUTPATIENT)
Facility: HOSPITAL | Age: 89
Discharge: HOME | End: 2024-04-25
Attending: INTERNAL MEDICINE
Payer: MEDICARE

## 2024-04-22 DIAGNOSIS — K86.2 CYST OF PANCREAS: ICD-10-CM

## 2024-04-22 PROCEDURE — 74176 CT ABD & PELVIS W/O CONTRAST: CPT

## 2024-04-23 DIAGNOSIS — R73.03 PREDIABETES: ICD-10-CM

## 2024-04-23 DIAGNOSIS — I10 ESSENTIAL HYPERTENSION, BENIGN: ICD-10-CM

## 2024-04-23 DIAGNOSIS — I25.10 CORONARY ARTERY DISEASE INVOLVING NATIVE CORONARY ARTERY OF NATIVE HEART WITHOUT ANGINA PECTORIS: ICD-10-CM

## 2024-04-23 LAB
ALBUMIN SERPL-MCNC: 3.8 G/DL (ref 3.5–5)
ALBUMIN/GLOB SERPL: 1.1 (ref 1.1–2.2)
ALP SERPL-CCNC: 64 U/L (ref 45–117)
ALT SERPL-CCNC: 26 U/L (ref 12–78)
ANION GAP SERPL CALC-SCNC: 4 MMOL/L (ref 5–15)
AST SERPL-CCNC: 24 U/L (ref 15–37)
BASOPHILS # BLD: 0 K/UL (ref 0–0.1)
BASOPHILS NFR BLD: 1 % (ref 0–1)
BILIRUB SERPL-MCNC: 1.5 MG/DL (ref 0.2–1)
BUN SERPL-MCNC: 23 MG/DL (ref 6–20)
BUN/CREAT SERPL: 22 (ref 12–20)
CALCIUM SERPL-MCNC: 9.7 MG/DL (ref 8.5–10.1)
CHLORIDE SERPL-SCNC: 108 MMOL/L (ref 97–108)
CHOLEST SERPL-MCNC: 169 MG/DL
CO2 SERPL-SCNC: 30 MMOL/L (ref 21–32)
CREAT SERPL-MCNC: 1.03 MG/DL (ref 0.7–1.3)
DIFFERENTIAL METHOD BLD: ABNORMAL
EOSINOPHIL # BLD: 0.1 K/UL (ref 0–0.4)
EOSINOPHIL NFR BLD: 2 % (ref 0–7)
ERYTHROCYTE [DISTWIDTH] IN BLOOD BY AUTOMATED COUNT: 14 % (ref 11.5–14.5)
EST. AVERAGE GLUCOSE BLD GHB EST-MCNC: 105 MG/DL
GLOBULIN SER CALC-MCNC: 3.5 G/DL (ref 2–4)
GLUCOSE SERPL-MCNC: 100 MG/DL (ref 65–100)
HBA1C MFR BLD: 5.3 % (ref 4–5.6)
HCT VFR BLD AUTO: 38 % (ref 36.6–50.3)
HDLC SERPL-MCNC: 119 MG/DL
HDLC SERPL: 1.4 (ref 0–5)
HGB BLD-MCNC: 13.1 G/DL (ref 12.1–17)
IMM GRANULOCYTES # BLD AUTO: 0 K/UL (ref 0–0.04)
IMM GRANULOCYTES NFR BLD AUTO: 0 % (ref 0–0.5)
LDLC SERPL CALC-MCNC: 39.4 MG/DL (ref 0–100)
LYMPHOCYTES # BLD: 1.9 K/UL (ref 0.8–3.5)
LYMPHOCYTES NFR BLD: 38 % (ref 12–49)
MCH RBC QN AUTO: 34.8 PG (ref 26–34)
MCHC RBC AUTO-ENTMCNC: 34.5 G/DL (ref 30–36.5)
MCV RBC AUTO: 101.1 FL (ref 80–99)
MONOCYTES # BLD: 0.7 K/UL (ref 0–1)
MONOCYTES NFR BLD: 13 % (ref 5–13)
NEUTS SEG # BLD: 2.3 K/UL (ref 1.8–8)
NEUTS SEG NFR BLD: 46 % (ref 32–75)
NRBC # BLD: 0 K/UL (ref 0–0.01)
NRBC BLD-RTO: 0 PER 100 WBC
PLATELET # BLD AUTO: 128 K/UL (ref 150–400)
PMV BLD AUTO: 12.5 FL (ref 8.9–12.9)
POTASSIUM SERPL-SCNC: 4.8 MMOL/L (ref 3.5–5.1)
PROT SERPL-MCNC: 7.3 G/DL (ref 6.4–8.2)
RBC # BLD AUTO: 3.76 M/UL (ref 4.1–5.7)
SODIUM SERPL-SCNC: 142 MMOL/L (ref 136–145)
TRIGL SERPL-MCNC: 53 MG/DL
VLDLC SERPL CALC-MCNC: 10.6 MG/DL
WBC # BLD AUTO: 5 K/UL (ref 4.1–11.1)

## 2024-05-02 ENCOUNTER — OFFICE VISIT (OUTPATIENT)
Age: 89
End: 2024-05-02
Payer: MEDICARE

## 2024-05-02 VITALS
HEART RATE: 59 BPM | TEMPERATURE: 98.1 F | BODY MASS INDEX: 24.88 KG/M2 | DIASTOLIC BLOOD PRESSURE: 64 MMHG | WEIGHT: 168 LBS | OXYGEN SATURATION: 92 % | SYSTOLIC BLOOD PRESSURE: 124 MMHG | RESPIRATION RATE: 14 BRPM | HEIGHT: 69 IN

## 2024-05-02 DIAGNOSIS — C61 PROSTATE CANCER (HCC): ICD-10-CM

## 2024-05-02 DIAGNOSIS — J98.8 CONGESTION OF RESPIRATORY TRACT: ICD-10-CM

## 2024-05-02 DIAGNOSIS — R00.1 BRADYCARDIA, UNSPECIFIED: ICD-10-CM

## 2024-05-02 DIAGNOSIS — J31.0 CHRONIC RHINITIS: ICD-10-CM

## 2024-05-02 DIAGNOSIS — Z79.899 OTHER LONG TERM (CURRENT) DRUG THERAPY: ICD-10-CM

## 2024-05-02 DIAGNOSIS — I77.9 DISORDER OF ARTERIES AND ARTERIOLES, UNSPECIFIED (HCC): ICD-10-CM

## 2024-05-02 DIAGNOSIS — I10 ESSENTIAL HYPERTENSION, BENIGN: Primary | ICD-10-CM

## 2024-05-02 PROBLEM — I48.91 ATRIAL FIBRILLATION WITH SLOW VENTRICULAR RESPONSE (HCC): Status: RESOLVED | Noted: 2023-04-25 | Resolved: 2024-05-02

## 2024-05-02 PROCEDURE — G8427 DOCREV CUR MEDS BY ELIG CLIN: HCPCS | Performed by: INTERNAL MEDICINE

## 2024-05-02 PROCEDURE — 99214 OFFICE O/P EST MOD 30 MIN: CPT | Performed by: INTERNAL MEDICINE

## 2024-05-02 PROCEDURE — 1036F TOBACCO NON-USER: CPT | Performed by: INTERNAL MEDICINE

## 2024-05-02 PROCEDURE — G2211 COMPLEX E/M VISIT ADD ON: HCPCS | Performed by: INTERNAL MEDICINE

## 2024-05-02 PROCEDURE — 1123F ACP DISCUSS/DSCN MKR DOCD: CPT | Performed by: INTERNAL MEDICINE

## 2024-05-02 PROCEDURE — G8420 CALC BMI NORM PARAMETERS: HCPCS | Performed by: INTERNAL MEDICINE

## 2024-05-02 SDOH — ECONOMIC STABILITY: FOOD INSECURITY: WITHIN THE PAST 12 MONTHS, THE FOOD YOU BOUGHT JUST DIDN'T LAST AND YOU DIDN'T HAVE MONEY TO GET MORE.: PATIENT DECLINED

## 2024-05-02 SDOH — ECONOMIC STABILITY: FOOD INSECURITY: WITHIN THE PAST 12 MONTHS, YOU WORRIED THAT YOUR FOOD WOULD RUN OUT BEFORE YOU GOT MONEY TO BUY MORE.: PATIENT DECLINED

## 2024-05-02 SDOH — ECONOMIC STABILITY: INCOME INSECURITY: HOW HARD IS IT FOR YOU TO PAY FOR THE VERY BASICS LIKE FOOD, HOUSING, MEDICAL CARE, AND HEATING?: PATIENT DECLINED

## 2024-05-02 SDOH — ECONOMIC STABILITY: HOUSING INSECURITY
IN THE LAST 12 MONTHS, WAS THERE A TIME WHEN YOU DID NOT HAVE A STEADY PLACE TO SLEEP OR SLEPT IN A SHELTER (INCLUDING NOW)?: PATIENT DECLINED

## 2024-05-02 ASSESSMENT — PATIENT HEALTH QUESTIONNAIRE - PHQ9
SUM OF ALL RESPONSES TO PHQ9 QUESTIONS 1 & 2: 0
SUM OF ALL RESPONSES TO PHQ QUESTIONS 1-9: 0
SUM OF ALL RESPONSES TO PHQ QUESTIONS 1-9: 0
2. FEELING DOWN, DEPRESSED OR HOPELESS: NOT AT ALL
SUM OF ALL RESPONSES TO PHQ QUESTIONS 1-9: 0
1. LITTLE INTEREST OR PLEASURE IN DOING THINGS: NOT AT ALL
SUM OF ALL RESPONSES TO PHQ QUESTIONS 1-9: 0

## 2024-05-02 NOTE — PROGRESS NOTES
Chief Complaint   Patient presents with    Follow-up     Pt states this is his 6mth check up     Patient Active Problem List    Diagnosis    Arthritis of knee, left    Prostate cancer (HCC)    Nodule of left lung    Coronary artery disease involving native coronary artery without angina pectoris    Carotid artery disease (HCC)    Pancreatic cyst    Sarcopenia    GERD (gastroesophageal reflux disease)    HLD (hyperlipidemia)    BPH (benign prostatic hyperplasia)    ED (erectile dysfunction)    Abnormal PSA    Rhinitis    Essential hypertension, benign     When seen last time about six months ago, he was bradycardic and there was a question whether he was in AFib.  I reduced the dose of his diltiazem from 240 to 120 and he was advised to see Cardiology.  He said he monitors his EKG with his device and notes that he is sinus rhythm all the time.  He does have slow pulse rate in the high 40s.  If he exercises, he can get it up 80 to 100.  He has not seen any AFib.  He is not taking any aspirin or any anticoagulants.  He has had no TIA or neurological symptoms.  He has had no chest pain, PND or orthopnea.  Notes some cough occ congestion uses flonase daily for rhinitis no wheeze no asthmas non smoker   has for months    Blood pressure control is generally 130 to 140s systolic, diastolic is 70s.  He does okay on the 120, but he is still bradycardic.    I advised at one point to see Cardiology, but he never made the appointment.  Had a monitor a few years ago  no syncopeHis blood pressure today was okay.  S1, S2 was regular with a rate of 48.  Lungs were clear.  Abdomen soft.  Has an abnormal pancreas  with cysts  and repeat ct unchanged    Has gi issues attributed to radiation change  Review of Systems - General ROS: positive for  - fatigue  Hematological and Lymphatic ROS: negative  Respiratory ROS: no cough, shortness of breath, or wheezing  Cardiovascular ROS: no chest pain or dyspnea on exertion  negative for - loss

## 2024-07-01 RX ORDER — HYDROCHLOROTHIAZIDE 25 MG/1
25 TABLET ORAL DAILY
Qty: 90 TABLET | Refills: 3 | Status: SHIPPED | OUTPATIENT
Start: 2024-07-01

## 2024-07-01 RX ORDER — FLUTICASONE PROPIONATE 50 MCG
SPRAY, SUSPENSION (ML) NASAL
Qty: 48 G | Refills: 3 | Status: SHIPPED | OUTPATIENT
Start: 2024-07-01

## 2024-07-01 NOTE — TELEPHONE ENCOUNTER
Refill request received from [facility]    for   Requested Prescriptions     Pending Prescriptions Disp Refills    hydroCHLOROthiazide (HYDRODIURIL) 25 MG tablet [Pharmacy Med Name: HYDROCHLOROTHIAZIDE 25 MG Tablet] 90 tablet 3     Sig: TAKE 1 TABLET EVERY DAY    fluticasone (FLONASE) 50 MCG/ACT nasal spray [Pharmacy Med Name: FLUTICASONE PROPIONATE 50 MCG/ACT Suspension] 48 g 3     Sig: USE 2 SPRAYS IN EACH NOSTRIL EVERY DAY     Last office visit: 5/2/2024   Next office visit: 11/12/2024     Routed to Dr Fco Mobley for review.     Nandini Bah LPN

## 2024-07-17 ENCOUNTER — OFFICE VISIT (OUTPATIENT)
Age: 89
End: 2024-07-17
Payer: MEDICARE

## 2024-07-17 VITALS
WEIGHT: 168 LBS | SYSTOLIC BLOOD PRESSURE: 120 MMHG | BODY MASS INDEX: 24.88 KG/M2 | HEIGHT: 69 IN | HEART RATE: 50 BPM | DIASTOLIC BLOOD PRESSURE: 62 MMHG | OXYGEN SATURATION: 96 %

## 2024-07-17 DIAGNOSIS — I25.10 CORONARY ARTERY DISEASE INVOLVING NATIVE CORONARY ARTERY OF NATIVE HEART WITHOUT ANGINA PECTORIS: ICD-10-CM

## 2024-07-17 DIAGNOSIS — I10 ESSENTIAL HYPERTENSION, BENIGN: Primary | ICD-10-CM

## 2024-07-17 PROCEDURE — G8420 CALC BMI NORM PARAMETERS: HCPCS | Performed by: INTERNAL MEDICINE

## 2024-07-17 PROCEDURE — 1123F ACP DISCUSS/DSCN MKR DOCD: CPT | Performed by: INTERNAL MEDICINE

## 2024-07-17 PROCEDURE — 99204 OFFICE O/P NEW MOD 45 MIN: CPT | Performed by: INTERNAL MEDICINE

## 2024-07-17 PROCEDURE — G8427 DOCREV CUR MEDS BY ELIG CLIN: HCPCS | Performed by: INTERNAL MEDICINE

## 2024-07-17 PROCEDURE — 1036F TOBACCO NON-USER: CPT | Performed by: INTERNAL MEDICINE

## 2024-07-17 PROCEDURE — 93010 ELECTROCARDIOGRAM REPORT: CPT | Performed by: INTERNAL MEDICINE

## 2024-07-17 PROCEDURE — 93005 ELECTROCARDIOGRAM TRACING: CPT | Performed by: INTERNAL MEDICINE

## 2024-07-17 RX ORDER — LOSARTAN POTASSIUM 100 MG/1
50 TABLET ORAL DAILY
Qty: 90 TABLET | Refills: 1 | Status: SHIPPED | OUTPATIENT
Start: 2024-07-17 | End: 2024-07-17

## 2024-07-17 RX ORDER — LOSARTAN POTASSIUM 100 MG/1
100 TABLET ORAL DAILY
Qty: 90 TABLET | Refills: 1 | Status: SHIPPED | OUTPATIENT
Start: 2024-07-17

## 2024-07-17 ASSESSMENT — PATIENT HEALTH QUESTIONNAIRE - PHQ9
SUM OF ALL RESPONSES TO PHQ9 QUESTIONS 1 & 2: 0
SUM OF ALL RESPONSES TO PHQ QUESTIONS 1-9: 0
1. LITTLE INTEREST OR PLEASURE IN DOING THINGS: NOT AT ALL
2. FEELING DOWN, DEPRESSED OR HOPELESS: NOT AT ALL

## 2024-07-17 NOTE — PATIENT INSTRUCTIONS
STOP diltiazem    INCRESE losartan to 100mg    Have a monitor placed in 2 weeks to wear for 24 hours    We will see you in 3 months for an echo (ultrasound of your heart) and follow-up

## 2024-07-17 NOTE — PROGRESS NOTES
results found for this or any previous visit.     ATTENTION:   This medical record was transcribed using an electronic medical records/speech recognition system.  Although proofread, it may and can contain electronic, spelling and other errors.  Corrections may be executed at a later time.  Please feel free to contact us for any clarifications as needed.    Jeffrey [facility] [place]  [facility], [place], [place]. [phone]

## 2024-07-31 ENCOUNTER — OFFICE VISIT (OUTPATIENT)
Age: 89
End: 2024-07-31
Payer: MEDICARE

## 2024-07-31 ENCOUNTER — ANCILLARY PROCEDURE (OUTPATIENT)
Age: 89
End: 2024-07-31
Payer: MEDICARE

## 2024-07-31 VITALS
WEIGHT: 168 LBS | HEART RATE: 45 BPM | OXYGEN SATURATION: 96 % | HEIGHT: 69 IN | DIASTOLIC BLOOD PRESSURE: 76 MMHG | BODY MASS INDEX: 24.88 KG/M2 | SYSTOLIC BLOOD PRESSURE: 128 MMHG

## 2024-07-31 DIAGNOSIS — I10 ESSENTIAL HYPERTENSION, BENIGN: Primary | ICD-10-CM

## 2024-07-31 DIAGNOSIS — I25.10 CORONARY ARTERY DISEASE INVOLVING NATIVE CORONARY ARTERY OF NATIVE HEART WITHOUT ANGINA PECTORIS: ICD-10-CM

## 2024-07-31 DIAGNOSIS — E78.00 PURE HYPERCHOLESTEROLEMIA: ICD-10-CM

## 2024-07-31 DIAGNOSIS — I10 ESSENTIAL HYPERTENSION, BENIGN: ICD-10-CM

## 2024-07-31 PROCEDURE — 1123F ACP DISCUSS/DSCN MKR DOCD: CPT | Performed by: INTERNAL MEDICINE

## 2024-07-31 PROCEDURE — G8427 DOCREV CUR MEDS BY ELIG CLIN: HCPCS | Performed by: INTERNAL MEDICINE

## 2024-07-31 PROCEDURE — 1036F TOBACCO NON-USER: CPT | Performed by: INTERNAL MEDICINE

## 2024-07-31 PROCEDURE — G8420 CALC BMI NORM PARAMETERS: HCPCS | Performed by: INTERNAL MEDICINE

## 2024-07-31 PROCEDURE — 99214 OFFICE O/P EST MOD 30 MIN: CPT | Performed by: INTERNAL MEDICINE

## 2024-07-31 PROCEDURE — 93225 XTRNL ECG REC<48 HRS REC: CPT | Performed by: INTERNAL MEDICINE

## 2024-07-31 RX ORDER — HYDROCHLOROTHIAZIDE 25 MG/1
25 TABLET ORAL DAILY
Qty: 90 TABLET | Refills: 3 | Status: SHIPPED | OUTPATIENT
Start: 2024-07-31

## 2024-07-31 ASSESSMENT — PATIENT HEALTH QUESTIONNAIRE - PHQ9
2. FEELING DOWN, DEPRESSED OR HOPELESS: NOT AT ALL
SUM OF ALL RESPONSES TO PHQ QUESTIONS 1-9: 0
SUM OF ALL RESPONSES TO PHQ QUESTIONS 1-9: 0
1. LITTLE INTEREST OR PLEASURE IN DOING THINGS: NOT AT ALL
SUM OF ALL RESPONSES TO PHQ QUESTIONS 1-9: 0
SUM OF ALL RESPONSES TO PHQ QUESTIONS 1-9: 0
SUM OF ALL RESPONSES TO PHQ9 QUESTIONS 1 & 2: 0

## 2024-07-31 NOTE — PROGRESS NOTES
fall.    Investigations ordered    []    High complexity decision making was performed  []    Patient is at high-risk of decompensation with multiple organ involvement  []    Complex/difficult social determinants of health outcomes  Total of ** minutes were spent on reviewing the records, analyzing investigations and documentation in the chart, on the day of visit including time for examination and time spent with the patient  Investigations personally reviewed and interpreted  ECG was reviewed and shows atrial fibrillation with slow ventricular response    HPI: Oswaldo Vogt, a 89 y.o. year-old who is seen for evaluation of, fatigue, dizziness and management of slow heart rates.    Past Medical History:   Diagnosis Date    Abnormal PSA 05/09/2011    Arrhythmia     OCCASIONAL IRREGULAR BEAT    BPH (benign prostatic hyperplasia)     CAD (coronary artery disease)     CALCIUM BUILD-UP NOTED    Cancer (HCC) 04/2019    PROSTATE    Carotid artery disease (HCC) 04/14/2014    ED (erectile dysfunction)     GERD (gastroesophageal reflux disease) 5/21/2012    Hypercholesterolemia     Hypertension     Pancreatic cyst 10/21/2013    Rhinitis 8/23/2010    Sarcopenia 7/15/2013     Social History       Tobacco History       Smoking Status  Former Quit Date  8/23/1965 Smoking Tobacco Type  Cigarettes quit in 8/23/1965   Pack Year History     Packs/Day From To Years    0 8/23/1965  58.9    1   0.0      Smokeless Tobacco Use  Never              Alcohol History       Alcohol Use Status  Yes Amount  14.0 standard drinks of alcohol/wk              Drug Use       Drug Use Status  No              Sexual Activity       Sexually Active  Not Asked                     Review of system:Patient reports no dyspnea/PND/Orthpnea/CP. He reports no cough/fever/focal neurological deficits/abdominal pain.All other systems negative except as above.     Physical Exam  Vitals:    07/31/24 1026   BP: 128/76   Site: Right Upper Arm   Position: Sitting

## 2024-08-12 ENCOUNTER — TELEPHONE (OUTPATIENT)
Age: 89
End: 2024-08-12

## 2024-08-12 NOTE — TELEPHONE ENCOUNTER
Verified patient with two types of identifiers. Notified patient of results and MD recommendations. He states his swelling has improved since starting the Hydrochlorothiazide but not completely gone. His dizziness has also improved. Since stopping the diltiazem his SBP has averaged in the 140's. Will update MD/NP for any further recommendation.     Future Appointments   Date Time Provider Department Center   9/18/2024 11:00 AM BS [facility] ECHO 3 [facility]   10/16/2024  3:40 PM Tangela Forde MD [facility]   11/12/2024 10:00 AM Fco Mobley MD [facility]

## 2024-08-12 NOTE — TELEPHONE ENCOUNTER
----- Message from Dr. Tangela Forde MD sent at 8/12/2024  8:27 AM EDT -----  Inform patient that he is in persistent atrial fibrillation and his heart rates after stopping Diltiazem appear to be appropriate  ----- Message -----  From: Gerry Campbell MD  Sent: 8/11/2024  11:43 PM EDT  To: Tangela Forde MD

## 2024-09-18 ENCOUNTER — ANCILLARY PROCEDURE (OUTPATIENT)
Age: 89
End: 2024-09-18
Payer: MEDICARE

## 2024-09-18 VITALS
SYSTOLIC BLOOD PRESSURE: 126 MMHG | HEIGHT: 69 IN | BODY MASS INDEX: 24.88 KG/M2 | DIASTOLIC BLOOD PRESSURE: 80 MMHG | WEIGHT: 168 LBS

## 2024-09-18 DIAGNOSIS — I10 ESSENTIAL HYPERTENSION, BENIGN: ICD-10-CM

## 2024-09-18 DIAGNOSIS — I25.10 CORONARY ARTERY DISEASE INVOLVING NATIVE CORONARY ARTERY OF NATIVE HEART WITHOUT ANGINA PECTORIS: ICD-10-CM

## 2024-09-18 LAB
ECHO AO ASC DIAM: 4 CM
ECHO AO ASCENDING AORTA INDEX: 2.08 CM/M2
ECHO AO ROOT DIAM: 3.2 CM
ECHO AO ROOT INDEX: 1.67 CM/M2
ECHO AR MAX VEL PISA: 4 M/S
ECHO AV AREA PEAK VELOCITY: 1.3 CM2
ECHO AV AREA VTI: 1.4 CM2
ECHO AV AREA/BSA PEAK VELOCITY: 0.7 CM2/M2
ECHO AV AREA/BSA VTI: 0.7 CM2/M2
ECHO AV MEAN GRADIENT: 18 MMHG
ECHO AV MEAN VELOCITY: 2 M/S
ECHO AV PEAK GRADIENT: 34 MMHG
ECHO AV PEAK VELOCITY: 2.9 M/S
ECHO AV REGURGITANT PHT: 782.4 MILLISECOND
ECHO AV VELOCITY RATIO: 0.38
ECHO AV VTI: 63.7 CM
ECHO BSA: 1.93 M2
ECHO EST RA PRESSURE: 15 MMHG
ECHO LA DIAMETER INDEX: 2.14 CM/M2
ECHO LA DIAMETER: 4.1 CM
ECHO LA TO AORTIC ROOT RATIO: 1.28
ECHO LA VOL A-L A2C: 93 ML (ref 18–58)
ECHO LA VOL A-L A4C: 104 ML (ref 18–58)
ECHO LA VOL BP: 101 ML (ref 18–58)
ECHO LA VOL MOD A2C: 89 ML (ref 18–58)
ECHO LA VOL MOD A4C: 101 ML (ref 18–58)
ECHO LA VOL/BSA BIPLANE: 53 ML/M2 (ref 16–34)
ECHO LA VOLUME AREA LENGTH: 106 ML
ECHO LA VOLUME INDEX A-L A2C: 48 ML/M2 (ref 16–34)
ECHO LA VOLUME INDEX A-L A4C: 54 ML/M2 (ref 16–34)
ECHO LA VOLUME INDEX AREA LENGTH: 55 ML/M2 (ref 16–34)
ECHO LA VOLUME INDEX MOD A2C: 46 ML/M2 (ref 16–34)
ECHO LA VOLUME INDEX MOD A4C: 53 ML/M2 (ref 16–34)
ECHO LV E' LATERAL VELOCITY: 13 CM/S
ECHO LV E' SEPTAL VELOCITY: 9 CM/S
ECHO LV EDV A2C: 101 ML
ECHO LV EDV A4C: 84 ML
ECHO LV EDV BP: 93 ML (ref 67–155)
ECHO LV EDV INDEX A4C: 44 ML/M2
ECHO LV EDV INDEX BP: 48 ML/M2
ECHO LV EDV NDEX A2C: 53 ML/M2
ECHO LV EJECTION FRACTION A2C: 49 %
ECHO LV EJECTION FRACTION A4C: 59 %
ECHO LV EJECTION FRACTION BIPLANE: 54 % (ref 55–100)
ECHO LV ESV A2C: 52 ML
ECHO LV ESV A4C: 34 ML
ECHO LV ESV BP: 43 ML (ref 22–58)
ECHO LV ESV INDEX A2C: 27 ML/M2
ECHO LV ESV INDEX A4C: 18 ML/M2
ECHO LV ESV INDEX BP: 22 ML/M2
ECHO LV FRACTIONAL SHORTENING: 21 % (ref 28–44)
ECHO LV INTERNAL DIMENSION DIASTOLE INDEX: 2.76 CM/M2
ECHO LV INTERNAL DIMENSION DIASTOLIC: 5.3 CM (ref 4.2–5.9)
ECHO LV INTERNAL DIMENSION SYSTOLIC INDEX: 2.19 CM/M2
ECHO LV INTERNAL DIMENSION SYSTOLIC: 4.2 CM
ECHO LV IVSD: 1 CM (ref 0.6–1)
ECHO LV MASS 2D: 227.7 G (ref 88–224)
ECHO LV MASS INDEX 2D: 118.6 G/M2 (ref 49–115)
ECHO LV POSTERIOR WALL DIASTOLIC: 1.2 CM (ref 0.6–1)
ECHO LV RELATIVE WALL THICKNESS RATIO: 0.45
ECHO LVOT AREA: 3.5 CM2
ECHO LVOT AV VTI INDEX: 0.4
ECHO LVOT DIAM: 2.1 CM
ECHO LVOT MEAN GRADIENT: 2 MMHG
ECHO LVOT PEAK GRADIENT: 5 MMHG
ECHO LVOT PEAK VELOCITY: 1.1 M/S
ECHO LVOT STROKE VOLUME INDEX: 45.8 ML/M2
ECHO LVOT SV: 87.9 ML
ECHO LVOT VTI: 25.4 CM
ECHO MV A VELOCITY: 0.34 M/S
ECHO MV E DECELERATION TIME (DT): 250.5 MS
ECHO MV E VELOCITY: 0.71 M/S
ECHO MV E/A RATIO: 2.09
ECHO MV E/E' LATERAL: 5.46
ECHO MV E/E' RATIO (AVERAGED): 6.68
ECHO MV E/E' SEPTAL: 7.89
ECHO MV REGURGITANT ALIASING (NYQUIST) VELOCITY: 31 CM/S
ECHO MV REGURGITANT RADIUS PISA: 0.42 CM
ECHO RA AREA 4C: 30.1 CM2
ECHO RA END SYSTOLIC VOLUME APICAL 4 CHAMBER INDEX BSA: 54 ML/M2
ECHO RA VOLUME AREA LENGTH APICAL 4 CHAMBER: 108 ML
ECHO RA VOLUME: 103 ML
ECHO RIGHT VENTRICULAR SYSTOLIC PRESSURE (RVSP): 62 MMHG
ECHO RV FREE WALL PEAK S': 9 CM/S
ECHO RV INTERNAL DIMENSION: 3.8 CM
ECHO RV TAPSE: 1.7 CM (ref 1.7–?)
ECHO TV REGURGITANT MAX VELOCITY: 3.43 M/S
ECHO TV REGURGITANT PEAK GRADIENT: 47 MMHG

## 2024-09-18 PROCEDURE — 93306 TTE W/DOPPLER COMPLETE: CPT | Performed by: INTERNAL MEDICINE

## 2024-10-01 ENCOUNTER — TELEPHONE (OUTPATIENT)
Age: 89
End: 2024-10-01

## 2024-10-01 NOTE — TELEPHONE ENCOUNTER
Patient called in to cancel and reschedule appointment for 10/02/24. I informed that the next available appointment is 11/27/24, he stated that he feels that is to far out . I offered to put him with NP and he declined. Patient requested to please just have an nurse give him an call back.       Patient # ~ [phone]

## 2024-10-01 NOTE — TELEPHONE ENCOUNTER
Called pt back and rs    Future Appointments   Date Time Provider Department Center   10/16/2024 11:00 AM Tangela Forde MD [facility]   11/12/2024 10:00 AM Fco Mobley MD [facility] DEP

## 2024-10-07 ENCOUNTER — PATIENT MESSAGE (OUTPATIENT)
Age: 89
End: 2024-10-07

## 2024-10-07 RX ORDER — METHYLPREDNISOLONE 4 MG
TABLET, DOSE PACK ORAL
Qty: 1 KIT | Refills: 0 | Status: SHIPPED | OUTPATIENT
Start: 2024-10-07 | End: 2024-10-13

## 2024-10-16 ENCOUNTER — TELEPHONE (OUTPATIENT)
Age: 88
End: 2024-10-16

## 2024-10-16 ENCOUNTER — OFFICE VISIT (OUTPATIENT)
Age: 89
End: 2024-10-16
Payer: MEDICARE

## 2024-10-16 VITALS
DIASTOLIC BLOOD PRESSURE: 80 MMHG | HEIGHT: 69 IN | RESPIRATION RATE: 16 BRPM | SYSTOLIC BLOOD PRESSURE: 130 MMHG | OXYGEN SATURATION: 42 % | BODY MASS INDEX: 24.29 KG/M2 | HEART RATE: 96 BPM | WEIGHT: 164 LBS

## 2024-10-16 DIAGNOSIS — I48.91 ATRIAL FIBRILLATION, UNSPECIFIED TYPE (HCC): Primary | ICD-10-CM

## 2024-10-16 DIAGNOSIS — I25.10 CORONARY ARTERY DISEASE INVOLVING NATIVE CORONARY ARTERY OF NATIVE HEART WITHOUT ANGINA PECTORIS: ICD-10-CM

## 2024-10-16 DIAGNOSIS — I10 ESSENTIAL HYPERTENSION, BENIGN: ICD-10-CM

## 2024-10-16 PROCEDURE — 99214 OFFICE O/P EST MOD 30 MIN: CPT | Performed by: INTERNAL MEDICINE

## 2024-10-16 PROCEDURE — 1036F TOBACCO NON-USER: CPT | Performed by: INTERNAL MEDICINE

## 2024-10-16 PROCEDURE — G8428 CUR MEDS NOT DOCUMENT: HCPCS | Performed by: INTERNAL MEDICINE

## 2024-10-16 PROCEDURE — G8484 FLU IMMUNIZE NO ADMIN: HCPCS | Performed by: INTERNAL MEDICINE

## 2024-10-16 PROCEDURE — 1123F ACP DISCUSS/DSCN MKR DOCD: CPT | Performed by: INTERNAL MEDICINE

## 2024-10-16 PROCEDURE — G8420 CALC BMI NORM PARAMETERS: HCPCS | Performed by: INTERNAL MEDICINE

## 2024-10-16 RX ORDER — PREDNISONE 10 MG/1
TABLET ORAL
Qty: 21 TABLET | Refills: 0 | Status: SHIPPED | OUTPATIENT
Start: 2024-10-16

## 2024-10-16 ASSESSMENT — PATIENT HEALTH QUESTIONNAIRE - PHQ9
SUM OF ALL RESPONSES TO PHQ QUESTIONS 1-9: 0
2. FEELING DOWN, DEPRESSED OR HOPELESS: NOT AT ALL
SUM OF ALL RESPONSES TO PHQ QUESTIONS 1-9: 0
SUM OF ALL RESPONSES TO PHQ9 QUESTIONS 1 & 2: 0
SUM OF ALL RESPONSES TO PHQ QUESTIONS 1-9: 0
SUM OF ALL RESPONSES TO PHQ QUESTIONS 1-9: 0
1. LITTLE INTEREST OR PLEASURE IN DOING THINGS: NOT AT ALL

## 2024-10-16 NOTE — PATIENT INSTRUCTIONS
Continue prednisone for another week    Take 40mg x3 days (2 at breakfast, 2 at dinner)  Then take 20mg x3 days (1 at breakfast, 1 at lunch)  Then take 10mg  x3 days (1 at breakfast)    START eliquis 5mg twice daily    If you have persistent chest pain, call our office!

## 2024-10-16 NOTE — PROGRESS NOTES
Dr. Maurisio Murphy [facility] Cardiology.  [phone]            Cardiology Consult/Progress Note      Requesting/referring provider: Fco Mobley MDNo ref. provider found     Reason for Consult: Tiredness and fatigue    Assessment/Plan:  1.  Persistent atrial fibrillation with slow ventricular response  2.  Hypertension  3.  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  Hyperlipidemia  5.  Remote history of increased coronary calcification/asymptomatic coronary disease triggering initiation of statins  6.  Recent ER visit with increasing congestion possibly bronchitis.  If symptoms of chest fullness persist, may require evaluation for coronary artery disease  7.  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    Oswaldo Vogt is evaluated for tiredness and fatigue and some bradycardia.  During recent visit he had atrial fibrillation with slow ventricular rate and diltiazem was discontinued.  Unfortunately he also discontinued his hydrochlorothiazide and with that he has had more swelling in his legs.  I recommend him to reinitiate his hydrochlorothiazide.  May continue losartan at 1 mg daily but can back up to 50 mg if blood pressure starts running low.  Dizziness has improved since discontinuing diltiazem.     I do not strongly feel that he will benefit from elective cardioversion given his age and with suspicion of underlying sinus node dysfunction he may end up requiring a pacemaker.  I discussed the natural course of conduction tissue disease and anticipate in the next couple of years he is likely going to need a permanent pacemaker.    His heart rate continues in 40s and he has persistent atrial fibrillation.I have informed him that he should initiate anticoagulation to prevent stroke risk.  He is willing to initiate Eliquis 5 mg twice a day.  He is also concerned about significant

## 2024-10-17 ENCOUNTER — TELEPHONE (OUTPATIENT)
Age: 89
End: 2024-10-17

## 2024-10-17 NOTE — TELEPHONE ENCOUNTER
Patient is requesting a call back from the NP Fern , stated that she called  and would like to speak with NP but didn't give any information on why        Contact Information  [phone] (Home Phone)  [phone] (Mobile)

## 2024-10-17 NOTE — TELEPHONE ENCOUNTER
Telephone call made to patient. Two patient identifiers verified. Clarified prednisone dosing with pt. Pt verbalizes understanding.    Pt read up on Eliquis and he does not think this is a medication he wishes to take. He would like to proceed with watchman.    Future Appointments   Date Time Provider Department Center   11/12/2024 10:00 AM Fco Mobley MD [facility]   4/15/2025 11:00 AM Scarlett Menchaca APRN - NP [facility]   10/15/2025 10:20 AM Tangela Forde MD [facility]

## 2024-10-17 NOTE — TELEPHONE ENCOUNTER
Patient called in requesting to speak with nurse Shirley about medication questions from yesterday appointment . Fern stated she will give patient an call back .         Patient #~ [phone]

## 2024-10-18 DIAGNOSIS — I25.10 CORONARY ARTERY DISEASE INVOLVING NATIVE CORONARY ARTERY OF NATIVE HEART WITHOUT ANGINA PECTORIS: Primary | ICD-10-CM

## 2024-10-18 DIAGNOSIS — I48.91 ATRIAL FIBRILLATION, UNSPECIFIED TYPE (HCC): ICD-10-CM

## 2024-10-18 NOTE — TELEPHONE ENCOUNTER
Telephone call made to patient. Two patient identifiers verified. Advised pt he would need to be on eliquis post-watchman for 6 months. Pt expresses hesitation as he likes to have a few alcoholic drinks nightly. Advised pt that this is ok though they both increase bleeding risk per Dr Forde. Pt would like to proceed with watchman. Told pt that  would touch base with him early next week. Pt has no further questions.

## 2024-10-21 ENCOUNTER — TELEPHONE (OUTPATIENT)
Age: 89
End: 2024-10-21

## 2024-10-21 DIAGNOSIS — I48.91 ATRIAL FIBRILLATION, UNSPECIFIED TYPE (HCC): Primary | ICD-10-CM

## 2024-10-21 NOTE — TELEPHONE ENCOUNTER
Called patient who wants to proceed with watchman but is not happy with 12/24 or 12/26 which are the only 2 days I have available. Patient would like to talk to Dr. Maurisio Mendoza or Fern. Patient also advised me that he has not started Eliquis. Please advise.      ----- Message from GURDEEP HUMPHRIES RN sent at 10/18/2024  4:50 PM EDT -----  Regarding: watchman  Reminder from Dr MCKEON Dec 24 and 26 can be used    CPT 81039  ICD  I48.91  Meds hold eliquis 2d before and day of (if he's taking),   Labs- ordered, please remind him to get in 1 mo

## 2024-10-21 NOTE — TELEPHONE ENCOUNTER
Spoke to patient and scheduled him for Watchman with Dr. Forde on 12/26 @ 7:30 am with 6:15 am arrival. Instructions sent to patient via my chart and advised to have labs between 11/25-12/20, advised that if he starts taking Eliquis to hold 2 days prior to procedure. Patient verbalized understanding and had no questions at the time of call.    Patient identification verified x2.      Patient Instructions    Watchman       Bilateral Angio w/ runoff      PFO  Pre-procedure instructions  Lab work: Please do between 11/25-12/20.  [facility] Draw Sites:  [facility] [place]: [address] [facility] [address]  [place]: [address] [address] [place] [address]  [place]: [address] [facility] [address]  [facility]: [address] [facility] [address]  [place]: [address] [facility] [address]  [facility]: [address]  [address] [facility] [address]  [place]: [address] [facility] [address]  The night before the procedure nothing to eat or drink after midnight, you may take approved medications the morning of the procedure with a few sips of water.  Stop blood thinners 2 days prior to procedure EXCEPT: Brilinta, Plavix or Aspirin  Medication restrictions: If you start taking Eliquis HOLD 2 days prior to procedure.    Procedure day  Have a  that will bring you and take you home  Bring ID and insurance card  Wear comfortable clothing  Leave valuables at home, bring: dentures, hearing aids, or glasses  Bring overnight bag   Where to report  [facility]: go through main entrance and to the left is outpatient registration.    Date of procedure: 12/26 w/ Dr. Forde  Arrival Time: 6:15 am    Post procedure instructions  No driving for 24 hours  No heavy lifting (over 10lbs) or strenuous activity for 48hrs  No baths, swimming, hot tubs, or spas for a week  The band aid over the cath site may be removed the day

## 2024-10-25 ENCOUNTER — TELEPHONE (OUTPATIENT)
Age: 89
End: 2024-10-25

## 2024-10-25 NOTE — TELEPHONE ENCOUNTER
Spoke to patient who wants to know if he gets his labs done in early November will it be okay since its before the normal 30 day window?    Patient identification verified x2.       Is Methotrexate Contraindicated?: No Taltz Dosing: 160mg SC x 1 at weeks 0 then 80mg SC at weeks 2, 4, 6, 8, 10 and 12 then 80mg SC every four weeks Taltz Monitoring Guidelines: A yearly test for tuberculosis is required while taking Taltz. Diagnosis (Required): Psoriasis Pregnancy And Lactation Warning Text: The risk during pregnancy and breastfeeding is uncertain with this medication. Detail Level: Zone

## 2024-10-28 NOTE — TELEPHONE ENCOUNTER
Spoke to patient and advised him that he can have the labs early and if anything is abnormal they will just repeat it the day of the procedure. Patient verbalized understanding and had no further questions.    Patient identification verified x2.

## 2024-10-29 ENCOUNTER — PATIENT MESSAGE (OUTPATIENT)
Age: 89
End: 2024-10-29

## 2024-10-29 DIAGNOSIS — R00.1 BRADYCARDIA, UNSPECIFIED: ICD-10-CM

## 2024-10-29 DIAGNOSIS — I10 ESSENTIAL HYPERTENSION, BENIGN: Primary | ICD-10-CM

## 2024-10-29 DIAGNOSIS — I25.10 CORONARY ARTERY DISEASE INVOLVING NATIVE CORONARY ARTERY OF NATIVE HEART WITHOUT ANGINA PECTORIS: ICD-10-CM

## 2024-11-12 ENCOUNTER — OFFICE VISIT (OUTPATIENT)
Age: 89
End: 2024-11-12
Payer: MEDICARE

## 2024-11-12 VITALS
OXYGEN SATURATION: 95 % | HEIGHT: 69 IN | HEART RATE: 43 BPM | SYSTOLIC BLOOD PRESSURE: 135 MMHG | BODY MASS INDEX: 24.29 KG/M2 | WEIGHT: 164 LBS | RESPIRATION RATE: 18 BRPM | DIASTOLIC BLOOD PRESSURE: 77 MMHG | TEMPERATURE: 97.6 F

## 2024-11-12 DIAGNOSIS — I10 ESSENTIAL HYPERTENSION, BENIGN: ICD-10-CM

## 2024-11-12 DIAGNOSIS — I48.0 PAROXYSMAL ATRIAL FIBRILLATION (HCC): ICD-10-CM

## 2024-11-12 DIAGNOSIS — I10 ESSENTIAL HYPERTENSION, BENIGN: Primary | ICD-10-CM

## 2024-11-12 DIAGNOSIS — K62.7 RADIATION PROCTITIS: ICD-10-CM

## 2024-11-12 DIAGNOSIS — I25.10 CORONARY ARTERY DISEASE INVOLVING NATIVE CORONARY ARTERY OF NATIVE HEART WITHOUT ANGINA PECTORIS: ICD-10-CM

## 2024-11-12 DIAGNOSIS — E78.2 MIXED HYPERLIPIDEMIA: ICD-10-CM

## 2024-11-12 DIAGNOSIS — R10.9 ABDOMINAL DISCOMFORT: ICD-10-CM

## 2024-11-12 DIAGNOSIS — D69.6 THROMBOCYTOPENIA, UNSPECIFIED (HCC): ICD-10-CM

## 2024-11-12 LAB
ALBUMIN SERPL-MCNC: 3.7 G/DL (ref 3.5–5)
ALBUMIN/GLOB SERPL: 1.1 (ref 1.1–2.2)
ALP SERPL-CCNC: 74 U/L (ref 45–117)
ALT SERPL-CCNC: 31 U/L (ref 12–78)
ANION GAP SERPL CALC-SCNC: 3 MMOL/L (ref 2–12)
AST SERPL-CCNC: 31 U/L (ref 15–37)
BASOPHILS # BLD: 0 K/UL (ref 0–0.1)
BASOPHILS NFR BLD: 1 % (ref 0–1)
BILIRUB SERPL-MCNC: 1.3 MG/DL (ref 0.2–1)
BUN SERPL-MCNC: 18 MG/DL (ref 6–20)
BUN/CREAT SERPL: 17 (ref 12–20)
CALCIUM SERPL-MCNC: 9.4 MG/DL (ref 8.5–10.1)
CHLORIDE SERPL-SCNC: 105 MMOL/L (ref 97–108)
CHOLEST SERPL-MCNC: 148 MG/DL
CO2 SERPL-SCNC: 31 MMOL/L (ref 21–32)
CREAT SERPL-MCNC: 1.03 MG/DL (ref 0.7–1.3)
DIFFERENTIAL METHOD BLD: ABNORMAL
EOSINOPHIL # BLD: 0.2 K/UL (ref 0–0.4)
EOSINOPHIL NFR BLD: 3 % (ref 0–7)
ERYTHROCYTE [DISTWIDTH] IN BLOOD BY AUTOMATED COUNT: 15 % (ref 11.5–14.5)
GLOBULIN SER CALC-MCNC: 3.4 G/DL (ref 2–4)
GLUCOSE SERPL-MCNC: 100 MG/DL (ref 65–100)
HCT VFR BLD AUTO: 41.4 % (ref 36.6–50.3)
HDLC SERPL-MCNC: 90 MG/DL
HDLC SERPL: 1.6 (ref 0–5)
HGB BLD-MCNC: 13.8 G/DL (ref 12.1–17)
IMM GRANULOCYTES # BLD AUTO: 0 K/UL (ref 0–0.04)
IMM GRANULOCYTES NFR BLD AUTO: 0 % (ref 0–0.5)
LDLC SERPL CALC-MCNC: 45.8 MG/DL (ref 0–100)
LYMPHOCYTES # BLD: 2.1 K/UL (ref 0.8–3.5)
LYMPHOCYTES NFR BLD: 38 % (ref 12–49)
MCH RBC QN AUTO: 33.7 PG (ref 26–34)
MCHC RBC AUTO-ENTMCNC: 33.3 G/DL (ref 30–36.5)
MCV RBC AUTO: 101 FL (ref 80–99)
MONOCYTES # BLD: 1.1 K/UL (ref 0–1)
MONOCYTES NFR BLD: 19 % (ref 5–13)
NEUTS SEG # BLD: 2.2 K/UL (ref 1.8–8)
NEUTS SEG NFR BLD: 39 % (ref 32–75)
NRBC # BLD: 0 K/UL (ref 0–0.01)
NRBC BLD-RTO: 0 PER 100 WBC
PLATELET # BLD AUTO: 174 K/UL (ref 150–400)
PMV BLD AUTO: 11.7 FL (ref 8.9–12.9)
POTASSIUM SERPL-SCNC: 4.7 MMOL/L (ref 3.5–5.1)
PROT SERPL-MCNC: 7.1 G/DL (ref 6.4–8.2)
RBC # BLD AUTO: 4.1 M/UL (ref 4.1–5.7)
SODIUM SERPL-SCNC: 139 MMOL/L (ref 136–145)
TRIGL SERPL-MCNC: 61 MG/DL
VLDLC SERPL CALC-MCNC: 12.2 MG/DL
WBC # BLD AUTO: 5.6 K/UL (ref 4.1–11.1)

## 2024-11-12 PROCEDURE — 99214 OFFICE O/P EST MOD 30 MIN: CPT | Performed by: INTERNAL MEDICINE

## 2024-11-12 NOTE — PROGRESS NOTES
I have reviewed all needed documentation in preparation for visit. Verified patient by name and date of birth  Chief Complaint   Patient presents with    6 Month Follow-Up       There were no vitals filed for this visit.    Health Maintenance Due   Topic Date Due    Prostate Specific Antigen (PSA) Screening or Monitoring  Never done    Respiratory Syncytial Virus (RSV) Pregnant or age 60 yrs+ (1 - 1-dose 60+ series) Never done    Annual Wellness Visit (Medicare Advantage)  Never done    Flu vaccine (1) 08/01/2024    COVID-19 Vaccine (5 - 2023-24 season) 09/01/2024     \"Have you been to the ER, urgent care clinic since your last visit?  Hospitalized since your last visit?\"    NO    “Have you seen or consulted any other health care providers outside of [facility] since your last visit?”    NO          Click Here for Release of Records Request         Neida Davis CCM

## 2024-11-13 ENCOUNTER — TELEPHONE (OUTPATIENT)
Age: 89
End: 2024-11-13

## 2024-11-13 NOTE — PROGRESS NOTES
Chief Complaint   Patient presents with    Follow-up     Pt states this is his 6mth check up     Patient Active Problem List    Diagnosis    Arthritis of knee, left    Prostate cancer (HCC)    Nodule of left lung    Coronary artery disease involving native coronary artery without angina pectoris    Carotid artery disease (HCC)    Pancreatic cyst    Sarcopenia    GERD (gastroesophageal reflux disease)    HLD (hyperlipidemia)    BPH (benign prostatic hyperplasia)    ED (erectile dysfunction)    Abnormal PSA    Rhinitis    Essential hypertension, benign     When seen last time about six months ago, he was bradycardic and there was a question whether he was in AFib.  I reduced the dose of his diltiazem from 240 to 120 and he was advised to see Cardiology.  He said he monitors his EKG with his device and notes that he is sinus rhythm all the time.  He does have slow pulse rate in the high 40s.  If he exercises, he can get it up 80 to 100.  He has not seen any AFib.  He is not taking any aspirin or any anticoagulants.  He has had no TIA or neurological symptoms.  He has had no chest pain, PND or orthopnea.  Notes some cough occ congestion uses flonase daily for rhinitis no wheeze no asthmas non smoker   has for months    He finally saw cardiology they took him off the diltiazem because of his slow pulse he is in A-fib they talked about whether he eventually may need a pacemaker and there is a bit of a controversy whether or not he should have a Watchman device he has been on Eliquis 5 mg twice daily for a month a month and a half he has had no bleeding from it he was told it may be the risk of stroke is lower with a watchman done with Eliquis I am not sure the study was done in 90-year-old's so he is a nonagenarian is generally pretty healthy gets around well does exercise works out in the gym drinks too much alcohol 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

## 2024-11-13 NOTE — TELEPHONE ENCOUNTER
Patient would like to cancel procedure with Dr. Forde, patient stated they will not be going forward with the procedure at this time.       Pt# [phone]

## 2024-11-14 NOTE — TELEPHONE ENCOUNTER
Telephone call made to patient. Two patient identifiers verified. Pt would like to stay on eliquis for the meantime and see how it goes. He is currently not experiencing any excessive bruising or overt bleeding. Pt has no further questions at this time.    Future Appointments   Date Time Provider Department Center   4/8/2025 10:00 AM Fco Mobley MD [facility]   4/15/2025 11:00 AM Scarlett Menchaca APRN - [facility]   10/15/2025 10:20 AM Tangela Forde MD [facility]

## 2024-11-20 DIAGNOSIS — R10.9 ABDOMINAL DISCOMFORT: Primary | ICD-10-CM

## 2024-11-20 RX ORDER — TAMSULOSIN HYDROCHLORIDE 0.4 MG/1
CAPSULE ORAL
Qty: 180 CAPSULE | Refills: 3 | Status: SHIPPED | OUTPATIENT
Start: 2024-11-20

## 2024-11-20 RX ORDER — ATORVASTATIN CALCIUM 20 MG/1
TABLET, FILM COATED ORAL
Qty: 90 TABLET | Refills: 3 | Status: SHIPPED | OUTPATIENT
Start: 2024-11-20

## 2024-12-03 ENCOUNTER — HOSPITAL ENCOUNTER (OUTPATIENT)
Facility: HOSPITAL | Age: 88
Discharge: HOME | End: 2024-12-06
Attending: INTERNAL MEDICINE
Payer: MEDICARE

## 2024-12-03 DIAGNOSIS — R10.9 ABDOMINAL DISCOMFORT: ICD-10-CM

## 2024-12-03 PROCEDURE — 74176 CT ABD & PELVIS W/O CONTRAST: CPT

## 2024-12-16 DIAGNOSIS — I10 ESSENTIAL HYPERTENSION, BENIGN: Primary | ICD-10-CM

## 2024-12-16 DIAGNOSIS — I25.10 CORONARY ARTERY DISEASE INVOLVING NATIVE CORONARY ARTERY OF NATIVE HEART WITHOUT ANGINA PECTORIS: ICD-10-CM

## 2024-12-16 RX ORDER — LOSARTAN POTASSIUM 100 MG/1
100 TABLET ORAL DAILY
Qty: 90 TABLET | Refills: 3 | Status: SHIPPED | OUTPATIENT
Start: 2024-12-16

## 2024-12-16 NOTE — TELEPHONE ENCOUNTER
Requested Prescriptions     Signed Prescriptions Disp Refills    losartan (COZAAR) 100 MG tablet 90 tablet 3     Sig: TAKE 1 TABLET EVERY DAY     Authorizing Provider: HONG FORDE     Ordering User: BOB CARPENTER per MD    Future Appointments   Date Time Provider Department Center   4/8/2025 10:00 AM Fco Mobley MD [facility]   4/15/2025 11:00 AM Scarlett Menchaca APRN - [facility]   10/15/2025 10:20 AM Hong Forde MD [facility]

## 2025-01-31 ENCOUNTER — TELEPHONE (OUTPATIENT)
Age: 89
End: 2025-01-31

## 2025-01-31 NOTE — TELEPHONE ENCOUNTER
Patient is calling because he needs a refill on his Eliquis 5 mg. Patient would like a 90 day refill.    Patient would like the medication shipped to:  [address] [place] [id number]      Pharmacy:  [facility] - [facility] [address] - P [phone] F [phone] patient

## 2025-01-31 NOTE — PROGRESS NOTES
Requested Prescriptions     Signed Prescriptions Disp Refills    apixaban (ELIQUIS) 5 MG TABS tablet 180 tablet 3     Sig: Take 1 tablet by mouth 2 times daily     VO per MD    Future Appointments   Date Time Provider Department Center   4/8/2025 10:00 AM [facility]   4/15/2025 11:00 AM Scarlett Menchaca APRN - [facility]   10/15/2025 10:20 AM Tangela Forde MD [facility]

## 2025-03-11 ENCOUNTER — TELEPHONE (OUTPATIENT)
Age: 89
End: 2025-03-11

## 2025-03-11 NOTE — TELEPHONE ENCOUNTER
Spoke with patient regarding his request to reschedule his 04/08 appointment to a later date and time. Patient states he is currently in [place] and will be out of town until the week of 04/22. I let him know that Dr. Mobley is retiring on 04/10, so he would not be able to see him on 04/22. I let him know that he would have to re-establish care with a new provider and offered to schedule with Dr. Long or Dr. Worley. He states he usually sees Dr. Mobley every 6 months for for labs and a CT of the abdomen due to abdominal issues. Patient scheduled to re-establish care with Dr. Long on 04/23/25. He is requesting orders for labs and abdominal CT to be placed before Dr. Mobley retires.

## 2025-03-11 NOTE — TELEPHONE ENCOUNTER
----- Message from Lexy DUBOIS sent at 3/11/2025  9:57 AM EDT -----  Regarding: [facility] Appointment Request  E[facility] Appointment Request    Patient needs appointment for E[facility] Appointment Type: Existing Condition Follow Up.    Patient Requested Dates(s):04/22 or 04/23  Patient Requested Time:mid morning   Provider Name:Fco Mobley MD    Reason for Appointment Request: Established Patient - Available appointments did not meet patient need  Note: Patient is currently set for 04/08 but he wanted to reschedule is since he will be out of state at that day  --------------------------------------------------------------------------------------------------------------------------    Relationship to Patient: Self     Call Back Information: OK to leave message on voicemail  Preferred Call Back Number: Phone [phone]

## 2025-03-27 ENCOUNTER — PATIENT MESSAGE (OUTPATIENT)
Age: 89
End: 2025-03-27

## 2025-03-27 DIAGNOSIS — E78.2 MIXED HYPERLIPIDEMIA: ICD-10-CM

## 2025-03-27 DIAGNOSIS — C61 PROSTATE CANCER (HCC): ICD-10-CM

## 2025-03-27 DIAGNOSIS — K21.9 GASTROESOPHAGEAL REFLUX DISEASE, UNSPECIFIED WHETHER ESOPHAGITIS PRESENT: ICD-10-CM

## 2025-03-27 DIAGNOSIS — I48.0 PAROXYSMAL ATRIAL FIBRILLATION (HCC): ICD-10-CM

## 2025-03-27 DIAGNOSIS — I10 ESSENTIAL HYPERTENSION, BENIGN: ICD-10-CM

## 2025-03-27 DIAGNOSIS — I25.10 CORONARY ARTERY DISEASE INVOLVING NATIVE CORONARY ARTERY OF NATIVE HEART WITHOUT ANGINA PECTORIS: ICD-10-CM

## 2025-03-27 DIAGNOSIS — K86.2 PANCREATIC CYST: Primary | ICD-10-CM

## 2025-04-15 ENCOUNTER — OFFICE VISIT (OUTPATIENT)
Age: 89
End: 2025-04-15
Payer: MEDICARE

## 2025-04-15 VITALS
SYSTOLIC BLOOD PRESSURE: 132 MMHG | BODY MASS INDEX: 25.12 KG/M2 | HEART RATE: 44 BPM | DIASTOLIC BLOOD PRESSURE: 80 MMHG | HEIGHT: 69 IN | WEIGHT: 169.6 LBS | OXYGEN SATURATION: 96 %

## 2025-04-15 DIAGNOSIS — I48.0 PAROXYSMAL ATRIAL FIBRILLATION (HCC): ICD-10-CM

## 2025-04-15 DIAGNOSIS — I25.10 CORONARY ARTERY DISEASE INVOLVING NATIVE CORONARY ARTERY OF NATIVE HEART WITHOUT ANGINA PECTORIS: ICD-10-CM

## 2025-04-15 DIAGNOSIS — E78.2 MIXED HYPERLIPIDEMIA: ICD-10-CM

## 2025-04-15 DIAGNOSIS — K86.2 PANCREATIC CYST: ICD-10-CM

## 2025-04-15 DIAGNOSIS — I48.11 LONGSTANDING PERSISTENT ATRIAL FIBRILLATION (HCC): Primary | ICD-10-CM

## 2025-04-15 DIAGNOSIS — K21.9 GASTROESOPHAGEAL REFLUX DISEASE, UNSPECIFIED WHETHER ESOPHAGITIS PRESENT: ICD-10-CM

## 2025-04-15 DIAGNOSIS — I10 ESSENTIAL HYPERTENSION, BENIGN: ICD-10-CM

## 2025-04-15 DIAGNOSIS — C61 PROSTATE CANCER (HCC): ICD-10-CM

## 2025-04-15 LAB
ALBUMIN SERPL-MCNC: 3.9 G/DL (ref 3.5–5)
ALBUMIN/GLOB SERPL: 1.3 (ref 1.1–2.2)
ALP SERPL-CCNC: 79 U/L (ref 45–117)
ALT SERPL-CCNC: 33 U/L (ref 12–78)
ANION GAP SERPL CALC-SCNC: 2 MMOL/L (ref 2–12)
AST SERPL-CCNC: 33 U/L (ref 15–37)
BILIRUB SERPL-MCNC: 2.9 MG/DL (ref 0.2–1)
BUN SERPL-MCNC: 22 MG/DL (ref 6–20)
BUN/CREAT SERPL: 20 (ref 12–20)
CALCIUM SERPL-MCNC: 9.3 MG/DL (ref 8.5–10.1)
CHLORIDE SERPL-SCNC: 106 MMOL/L (ref 97–108)
CHOLEST SERPL-MCNC: 130 MG/DL
CO2 SERPL-SCNC: 32 MMOL/L (ref 21–32)
CREAT SERPL-MCNC: 1.09 MG/DL (ref 0.7–1.3)
ERYTHROCYTE [DISTWIDTH] IN BLOOD BY AUTOMATED COUNT: 14.6 % (ref 11.5–14.5)
GLOBULIN SER CALC-MCNC: 3.1 G/DL (ref 2–4)
GLUCOSE SERPL-MCNC: 97 MG/DL (ref 65–100)
HCT VFR BLD AUTO: 39.5 % (ref 36.6–50.3)
HDLC SERPL-MCNC: 88 MG/DL
HDLC SERPL: 1.5 (ref 0–5)
HGB BLD-MCNC: 13.3 G/DL (ref 12.1–17)
LDLC SERPL CALC-MCNC: 29.6 MG/DL (ref 0–100)
MCH RBC QN AUTO: 34.1 PG (ref 26–34)
MCHC RBC AUTO-ENTMCNC: 33.7 G/DL (ref 30–36.5)
MCV RBC AUTO: 101.3 FL (ref 80–99)
NRBC # BLD: 0 K/UL (ref 0–0.01)
NRBC BLD-RTO: 0 PER 100 WBC
PLATELET # BLD AUTO: 118 K/UL (ref 150–400)
PMV BLD AUTO: 12.9 FL (ref 8.9–12.9)
POTASSIUM SERPL-SCNC: 3.9 MMOL/L (ref 3.5–5.1)
PROT SERPL-MCNC: 7 G/DL (ref 6.4–8.2)
RBC # BLD AUTO: 3.9 M/UL (ref 4.1–5.7)
SODIUM SERPL-SCNC: 140 MMOL/L (ref 136–145)
TRIGL SERPL-MCNC: 62 MG/DL
VLDLC SERPL CALC-MCNC: 12.4 MG/DL
WBC # BLD AUTO: 4.7 K/UL (ref 4.1–11.1)

## 2025-04-15 PROCEDURE — 1036F TOBACCO NON-USER: CPT

## 2025-04-15 PROCEDURE — 1126F AMNT PAIN NOTED NONE PRSNT: CPT

## 2025-04-15 PROCEDURE — 99214 OFFICE O/P EST MOD 30 MIN: CPT

## 2025-04-15 PROCEDURE — G8427 DOCREV CUR MEDS BY ELIG CLIN: HCPCS

## 2025-04-15 PROCEDURE — 1159F MED LIST DOCD IN RCRD: CPT

## 2025-04-15 PROCEDURE — G8419 CALC BMI OUT NRM PARAM NOF/U: HCPCS

## 2025-04-15 PROCEDURE — 1123F ACP DISCUSS/DSCN MKR DOCD: CPT

## 2025-04-15 ASSESSMENT — PATIENT HEALTH QUESTIONNAIRE - PHQ9
SUM OF ALL RESPONSES TO PHQ QUESTIONS 1-9: 0
1. LITTLE INTEREST OR PLEASURE IN DOING THINGS: NOT AT ALL
2. FEELING DOWN, DEPRESSED OR HOPELESS: NOT AT ALL
SUM OF ALL RESPONSES TO PHQ QUESTIONS 1-9: 0

## 2025-04-15 NOTE — ASSESSMENT & PLAN NOTE
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

## 2025-04-15 NOTE — PROGRESS NOTES
1. Have you been to the ER, urgent care clinic since your last visit?  Hospitalized since your last visit?  No    2. Have you seen or consulted any other health care providers outside of the [facility] since your last visit?  Include any pap smears or colon screening.   No

## 2025-04-15 NOTE — PROGRESS NOTES
Patient: Oswaldo Vogt  : 1934    Primary Cardiologist:Dr. 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 SUSY Forde in 2025 with echo as scheduled.      HISTORY OF PRESENT ILLNESS:     History of Present Illness:  Oswaldo Vogt is a 89 y.o. male       He last saw Dr. Forde in 2024. Per his note: Oswaldo Vogt is evaluated for tiredness and fatigue and some bradycardia.  During recent visit he had atrial fibrillation with slow ventricular rate and diltiazem was discontinued.  Unfortunately he also discontinued his hydrochlorothiazide and with that he has had more swelling in his legs.  I recommend him to reinitiate his hydrochlorothiazide.  May continue losartan at 1 mg daily but can back up to 50 mg if blood pressure starts running low.  Dizziness has improved since discontinuing diltiazem.      I do not strongly feel that he will benefit from elective cardioversion given his age and with suspicion of underlying sinus node

## 2025-04-16 RX ORDER — FLUTICASONE PROPIONATE 50 MCG
2 SPRAY, SUSPENSION (ML) NASAL DAILY
Qty: 48 G | Refills: 3 | Status: SHIPPED | OUTPATIENT
Start: 2025-04-16

## 2025-04-17 ENCOUNTER — HOSPITAL ENCOUNTER (OUTPATIENT)
Facility: HOSPITAL | Age: 89
Discharge: HOME | End: 2025-04-20
Attending: INTERNAL MEDICINE
Payer: MEDICARE

## 2025-04-17 DIAGNOSIS — C61 PROSTATE CANCER (HCC): ICD-10-CM

## 2025-04-17 DIAGNOSIS — K86.2 PANCREATIC CYST: ICD-10-CM

## 2025-04-17 PROCEDURE — 74176 CT ABD & PELVIS W/O CONTRAST: CPT

## 2025-04-23 ENCOUNTER — OFFICE VISIT (OUTPATIENT)
Age: 89
End: 2025-04-23
Payer: MEDICARE

## 2025-04-23 VITALS
HEART RATE: 51 BPM | TEMPERATURE: 97.6 F | HEIGHT: 68 IN | RESPIRATION RATE: 16 BRPM | DIASTOLIC BLOOD PRESSURE: 70 MMHG | SYSTOLIC BLOOD PRESSURE: 138 MMHG | BODY MASS INDEX: 24.86 KG/M2 | WEIGHT: 164 LBS | OXYGEN SATURATION: 97 %

## 2025-04-23 DIAGNOSIS — Z76.89 ENCOUNTER TO ESTABLISH CARE: Primary | ICD-10-CM

## 2025-04-23 DIAGNOSIS — K86.2 PANCREATIC CYST: ICD-10-CM

## 2025-04-23 DIAGNOSIS — I48.0 PAROXYSMAL ATRIAL FIBRILLATION (HCC): ICD-10-CM

## 2025-04-23 DIAGNOSIS — C61 PROSTATE CANCER (HCC): ICD-10-CM

## 2025-04-23 DIAGNOSIS — E78.2 MIXED HYPERLIPIDEMIA: ICD-10-CM

## 2025-04-23 DIAGNOSIS — I10 ESSENTIAL HYPERTENSION, BENIGN: ICD-10-CM

## 2025-04-23 DIAGNOSIS — R73.03 PREDIABETES: ICD-10-CM

## 2025-04-23 DIAGNOSIS — K62.7 RADIATION PROCTITIS: ICD-10-CM

## 2025-04-23 DIAGNOSIS — I51.7 ENLARGED HEART: ICD-10-CM

## 2025-04-23 DIAGNOSIS — I77.9 CAROTID ARTERY DISEASE, UNSPECIFIED LATERALITY, UNSPECIFIED TYPE: ICD-10-CM

## 2025-04-23 PROCEDURE — 1123F ACP DISCUSS/DSCN MKR DOCD: CPT | Performed by: INTERNAL MEDICINE

## 2025-04-23 PROCEDURE — 1126F AMNT PAIN NOTED NONE PRSNT: CPT | Performed by: INTERNAL MEDICINE

## 2025-04-23 PROCEDURE — G8420 CALC BMI NORM PARAMETERS: HCPCS | Performed by: INTERNAL MEDICINE

## 2025-04-23 PROCEDURE — 1160F RVW MEDS BY RX/DR IN RCRD: CPT | Performed by: INTERNAL MEDICINE

## 2025-04-23 PROCEDURE — G8427 DOCREV CUR MEDS BY ELIG CLIN: HCPCS | Performed by: INTERNAL MEDICINE

## 2025-04-23 PROCEDURE — 1036F TOBACCO NON-USER: CPT | Performed by: INTERNAL MEDICINE

## 2025-04-23 PROCEDURE — 1159F MED LIST DOCD IN RCRD: CPT | Performed by: INTERNAL MEDICINE

## 2025-04-23 PROCEDURE — 99214 OFFICE O/P EST MOD 30 MIN: CPT | Performed by: INTERNAL MEDICINE

## 2025-04-23 PROCEDURE — G2211 COMPLEX E/M VISIT ADD ON: HCPCS | Performed by: INTERNAL MEDICINE

## 2025-04-23 SDOH — HEALTH STABILITY: PHYSICAL HEALTH: ON AVERAGE, HOW MANY MINUTES DO YOU ENGAGE IN EXERCISE AT THIS LEVEL?: 40 MIN

## 2025-04-23 SDOH — HEALTH STABILITY: PHYSICAL HEALTH: ON AVERAGE, HOW MANY DAYS PER WEEK DO YOU ENGAGE IN MODERATE TO STRENUOUS EXERCISE (LIKE A BRISK WALK)?: 3 DAYS

## 2025-04-23 ASSESSMENT — ENCOUNTER SYMPTOMS
COUGH: 0
SHORTNESS OF BREATH: 0

## 2025-04-23 NOTE — PROGRESS NOTES
Oswaldo Vogt is a 89 y.o. male  Chief Complaint   Patient presents with    Establish Care    Abnormal CT       Vitals:    04/23/25 0938   BP: 138/70   Pulse:    Resp:    Temp:    SpO2:           HPI  Mr.James SILVINO Vogt is a pleasant 89-year-old male with a history of prostate cancer, A-fib, CAD, GERD, HLD, HTN, and symptomatic pancreatic cyst presents to the clinic to reestablish care.  He reports ongoing abdominal pain, attributed to his history of radiation therapy for prostate cancer, with concern about possible bowel leakage and gas. His symptoms, including inadvertent passing of gas and gas pain, have worsened with age. 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  His CT scan showed cardiomegaly, and he is under the care of cardiology for his A-fib, with potential plans for a pacemaker. He is on Eliquis and denies any signs of bleeding.  He also reports having had a colonoscopy last year.  He lives with his wife.  Past Medical History:   Diagnosis Date    Abnormal PSA 05/09/2011    Arrhythmia     OCCASIONAL IRREGULAR BEAT    BPH (benign prostatic hyperplasia)     CAD (coronary artery disease)     CALCIUM BUILD-UP NOTED    Cancer (HCC) 04/2019    PROSTATE    Carotid artery disease 04/14/2014    ED (erectile dysfunction)     GERD (gastroesophageal reflux disease) 5/21/2012    Hypercholesterolemia     Hypertension     Pancreatic cyst 10/21/2013    Rhinitis 8/23/2010    Sarcopenia 7/15/2013            ROS  Review of Systems   Constitutional:  Negative for fever.   Respiratory:  Negative for cough and shortness of breath.    Cardiovascular:  Negative for chest pain and palpitations.   Neurological:  Negative for headaches.   Psychiatric/Behavioral:  Negative for dysphoric mood.            EXAM  Physical Exam  Vitals reviewed.   Constitutional:       Appearance: Normal appearance.   HENT:      Head: Normocephalic and atraumatic.   Cardiovascular:      Rate and Rhythm:

## 2025-04-23 NOTE — PROGRESS NOTES
I have reviewed all needed documentation in preparation for visit. Verified patient by name and date of birth  Chief Complaint   Patient presents with    Establish Care       There were no vitals filed for this visit.    Health Maintenance Due   Topic Date Due    Prostate Specific Antigen (PSA) Screening or Monitoring  Never done    Respiratory Syncytial Virus (RSV) Pregnant or age 60 yrs+ (1 - 1-dose 75+ series) Never done    COVID-19 Vaccine (5 - 2024-25 season) 09/01/2024    Annual Wellness Visit (Medicare Advantage)  Never done     \"Have you been to the ER, urgent care clinic since your last visit?  Hospitalized since your last visit?\"    NO    “Have you seen or consulted any other health care providers outside of [facility] since your last visit?”    NO          Click Here for Release of Records Request         Neida Davis [facility]

## 2025-04-28 ENCOUNTER — HOSPITAL ENCOUNTER (OUTPATIENT)
Facility: HOSPITAL | Age: 89
Discharge: HOME | End: 2025-05-01
Payer: MEDICARE

## 2025-04-28 ENCOUNTER — RESULTS FOLLOW-UP (OUTPATIENT)
Age: 89
End: 2025-04-28

## 2025-04-28 DIAGNOSIS — I51.7 ENLARGED HEART: ICD-10-CM

## 2025-04-28 PROCEDURE — 71046 X-RAY EXAM CHEST 2 VIEWS: CPT

## 2025-06-05 ENCOUNTER — TELEMEDICINE (OUTPATIENT)
Age: 89
End: 2025-06-05
Payer: MEDICARE

## 2025-06-05 DIAGNOSIS — J40 BRONCHITIS: Primary | ICD-10-CM

## 2025-06-05 DIAGNOSIS — R05.8 UPPER AIRWAY COUGH SYNDROME: ICD-10-CM

## 2025-06-05 PROCEDURE — 1036F TOBACCO NON-USER: CPT | Performed by: INTERNAL MEDICINE

## 2025-06-05 PROCEDURE — 99213 OFFICE O/P EST LOW 20 MIN: CPT | Performed by: INTERNAL MEDICINE

## 2025-06-05 PROCEDURE — 1160F RVW MEDS BY RX/DR IN RCRD: CPT | Performed by: INTERNAL MEDICINE

## 2025-06-05 PROCEDURE — G8420 CALC BMI NORM PARAMETERS: HCPCS | Performed by: INTERNAL MEDICINE

## 2025-06-05 PROCEDURE — G8427 DOCREV CUR MEDS BY ELIG CLIN: HCPCS | Performed by: INTERNAL MEDICINE

## 2025-06-05 PROCEDURE — 1159F MED LIST DOCD IN RCRD: CPT | Performed by: INTERNAL MEDICINE

## 2025-06-05 PROCEDURE — 1123F ACP DISCUSS/DSCN MKR DOCD: CPT | Performed by: INTERNAL MEDICINE

## 2025-06-05 RX ORDER — AZITHROMYCIN 250 MG/1
TABLET, FILM COATED ORAL
Qty: 6 TABLET | Refills: 0 | Status: SHIPPED | OUTPATIENT
Start: 2025-06-05 | End: 2025-06-15

## 2025-06-05 RX ORDER — BENZONATATE 200 MG/1
200 CAPSULE ORAL 3 TIMES DAILY PRN
Qty: 30 CAPSULE | Refills: 0 | Status: SHIPPED | OUTPATIENT
Start: 2025-06-05 | End: 2025-06-15

## 2025-06-05 SDOH — ECONOMIC STABILITY: FOOD INSECURITY: WITHIN THE PAST 12 MONTHS, THE FOOD YOU BOUGHT JUST DIDN'T LAST AND YOU DIDN'T HAVE MONEY TO GET MORE.: NEVER TRUE

## 2025-06-05 SDOH — ECONOMIC STABILITY: FOOD INSECURITY: WITHIN THE PAST 12 MONTHS, YOU WORRIED THAT YOUR FOOD WOULD RUN OUT BEFORE YOU GOT MONEY TO BUY MORE.: NEVER TRUE

## 2025-06-05 ASSESSMENT — ENCOUNTER SYMPTOMS
TROUBLE SWALLOWING: 0
RHINORRHEA: 1
SINUS PAIN: 0
EYE PAIN: 0
COUGH: 1
CHEST TIGHTNESS: 1
SHORTNESS OF BREATH: 0

## 2025-06-05 NOTE — PROGRESS NOTES
2025    TELEHEALTH EVALUATION -- Audio/Visual    HPI:    Oswaldo Vogt (:  1934) has requested an audio/video evaluation for the following concern(s):  Presents with 4 days duration of cough, congestion and chest tightness for the last few days associated with runny nose and postnasal drainage.  He previously had similar symptoms that has resolved with oral antibiotics.  He does not have any fever or chills or sick contact.    Review of Systems   Constitutional:  Negative for fever.   HENT:  Positive for congestion, postnasal drip and rhinorrhea. Negative for ear pain, sinus pain and trouble swallowing.    Eyes:  Negative for pain.   Respiratory:  Positive for cough and chest tightness. Negative for shortness of breath.    Cardiovascular:  Negative for chest pain and palpitations.   Skin:  Negative for rash.   Allergic/Immunologic: Negative for environmental allergies.   Neurological:  Negative for dizziness and headaches.   Hematological:  Negative for adenopathy.   Psychiatric/Behavioral:  Negative for dysphoric mood.        Prior to Visit Medications    Medication Sig Taking? Authorizing Provider   azithromycin (ZITHROMAX) 250 MG tablet 500mg on day 1 followed by 250mg on days 2 - 5 Yes Luis Long MD   benzonatate (TESSALON) 200 MG capsule Take 1 capsule by mouth 3 times daily as needed for Cough Yes Luis Long MD   fluticasone (FLONASE) 50 MCG/ACT nasal spray USE 2 SPRAYS IN EACH NOSTRIL EVERY DAY Yes Luis Long MD   apixaban (ELIQUIS) 5 MG TABS tablet Take 1 tablet by mouth 2 times daily Yes Tangela Forde MD   losartan (COZAAR) 100 MG tablet TAKE 1 TABLET EVERY DAY Yes Tangela Forde MD   omeprazole (PRILOSEC) 20 MG delayed release capsule TAKE 1 CAPSULE EVERY DAY AS NEEDED FOR GASTROESOPHAGEAL REFLUX DISEASE Yes Fco Mobley MD   tamsulosin (FLOMAX) 0.4 MG capsule TAKE 1 CAPSULE TWICE DAILY  Patient taking differently: Take 1 capsule by mouth daily Yes Itz

## 2025-06-05 NOTE — PROGRESS NOTES
I have reviewed all needed documentation in preparation for visit. Verified patient by name and date of birth  No chief complaint on file.      There were no vitals filed for this visit.    Health Maintenance Due   Topic Date Due    Prostate Specific Antigen (PSA) Screening or Monitoring  Never done    COVID-19 Vaccine (7 - 2024-25 season) 09/01/2024    Annual Wellness Visit (Medicare Advantage)  Never done     \"Have you been to the ER, urgent care clinic since your last visit?  Hospitalized since your last visit?\"    NO    “Have you seen or consulted any other health care providers outside of [facility] since your last visit?”    NO          Click Here for Release of Records Request         Neida Davis [facility]

## 2025-06-07 RX ORDER — ALBUTEROL SULFATE 90 UG/1
2 INHALANT RESPIRATORY (INHALATION) EVERY 6 HOURS PRN
Qty: 1 EACH | Refills: 5 | Status: SHIPPED | OUTPATIENT
Start: 2025-06-07

## 2025-06-07 NOTE — PROGRESS NOTES
Patient called on Saturday, 6/7, reporting ongoing symptoms and no improvement. He reports shortness of breath, particularly after coughing, and began a Z-Pack (azithromycin) yesterday. His wife states he \"doesn’t look well\" and that he has had similar episodes in the past, which responded to an inhaler.  An albuterol inhaler will be prescribed for symptomatic relief.  He was advised to go to the emergency department given his age, respiratory symptoms, and potential for complications, including pneumonia, hypoxia, or heart-related causes of shortness of breath. However, the patient prefers urgent care, which is an acceptable next step provided he is stable.

## 2025-06-30 RX ORDER — HYDROCHLOROTHIAZIDE 25 MG/1
25 TABLET ORAL DAILY
Qty: 90 TABLET | Refills: 3 | Status: SHIPPED | OUTPATIENT
Start: 2025-06-30

## 2025-06-30 NOTE — TELEPHONE ENCOUNTER
Requested Prescriptions     Signed Prescriptions Disp Refills    hydroCHLOROthiazide (HYDRODIURIL) 25 MG tablet 90 tablet 3     Sig: TAKE 1 TABLET EVERY DAY     Authorizing Provider: HONG FORDE     Ordering User: BOB CARPENTER     Future Appointments   Date Time Provider Department Center   8/20/2025  9:40 AM Hong Forde MD [facility]   10/27/2025 11:30 AM [facility]

## 2025-07-02 ENCOUNTER — TELEMEDICINE (OUTPATIENT)
Age: 89
End: 2025-07-02

## 2025-07-02 DIAGNOSIS — N64.4 NIPPLE PAIN: Primary | ICD-10-CM

## 2025-07-02 ASSESSMENT — ENCOUNTER SYMPTOMS
SHORTNESS OF BREATH: 0
COUGH: 0

## 2025-07-02 NOTE — PROGRESS NOTES
2025    TELEHEALTH EVALUATION -- Audio/Visual    HPI:    Oswaldo Vogt (:  1934) has requested an audio/video evaluation for the following concern(s):    89-year-old male presents to the clinic with pain and tenderness around his nipple. He is concerned about the possibility of cancer or whether further evaluation is needed. He denies any other systemic symptoms.  As this visit was conducted virtually, I recommend monitoring the symptoms for one week. If the pain and tenderness do not improve, he should return for an in-person physical examination.    Review of Systems   Constitutional:  Negative for fever.   Respiratory:  Negative for cough and shortness of breath.    Cardiovascular:  Negative for chest pain and palpitations.   Neurological:  Negative for headaches.   Psychiatric/Behavioral:  Negative for dysphoric mood.        Prior to Visit Medications    Medication Sig Taking? Authorizing Provider   hydroCHLOROthiazide (HYDRODIURIL) 25 MG tablet TAKE 1 TABLET EVERY DAY Yes Tangela Forde MD   albuterol sulfate HFA (PROVENTIL;VENTOLIN;PROAIR) 108 (90 Base) MCG/ACT inhaler Inhale 2 puffs into the lungs every 6 hours as needed for Shortness of Breath Yes Luis Long MD   fluticasone (FLONASE) 50 MCG/ACT nasal spray USE 2 SPRAYS IN EACH NOSTRIL EVERY DAY Yes Luis Long MD   apixaban (ELIQUIS) 5 MG TABS tablet Take 1 tablet by mouth 2 times daily Yes Tangela Forde MD   losartan (COZAAR) 100 MG tablet TAKE 1 TABLET EVERY DAY Yes Tangela Forde MD   omeprazole (PRILOSEC) 20 MG delayed release capsule TAKE 1 CAPSULE EVERY DAY AS NEEDED FOR GASTROESOPHAGEAL REFLUX DISEASE Yes Fco Mobley MD   tamsulosin (FLOMAX) 0.4 MG capsule TAKE 1 CAPSULE TWICE DAILY Yes Fco Mobley MD   atorvastatin (LIPITOR) 20 MG tablet TAKE 1 TABLET EVERY DAY Yes Fco Mobley MD   vitamin E 400 UNIT capsule Take 1 capsule by mouth daily Yes Sofya Paz MD   vitamin D3 (CHOLECALCIFEROL)

## 2025-07-02 NOTE — PROGRESS NOTES
I have reviewed all needed documentation in preparation for visit. Verified patient by name and date of birth  Chief Complaint   Patient presents with    OTHER     Expressed by dermatologist- possible on chest area-        There were no vitals filed for this visit.    Health Maintenance Due   Topic Date Due    Prostate Specific Antigen (PSA) Screening or Monitoring  Never done    COVID-19 Vaccine (7 - 2024-25 season) 09/01/2024    Annual Wellness Visit (Medicare Advantage)  Never done     \"Have you been to the ER, urgent care clinic since your last visit?  Hospitalized since your last visit?\"    NO    “Have you seen or consulted any other health care providers outside of [facility] since your last visit?”    Yes dermatologist and eye doctor           Click Here for Release of Records Request         Bonnie phillips [facility]

## 2025-07-09 ENCOUNTER — OFFICE VISIT (OUTPATIENT)
Age: 89
End: 2025-07-09
Payer: MEDICARE

## 2025-07-09 VITALS
BODY MASS INDEX: 25.61 KG/M2 | TEMPERATURE: 97.4 F | HEART RATE: 66 BPM | WEIGHT: 169 LBS | RESPIRATION RATE: 16 BRPM | SYSTOLIC BLOOD PRESSURE: 138 MMHG | DIASTOLIC BLOOD PRESSURE: 78 MMHG | OXYGEN SATURATION: 96 % | HEIGHT: 68 IN

## 2025-07-09 DIAGNOSIS — N64.4 MASTODYNIA OF RIGHT BREAST: Primary | ICD-10-CM

## 2025-07-09 PROCEDURE — 1159F MED LIST DOCD IN RCRD: CPT | Performed by: INTERNAL MEDICINE

## 2025-07-09 PROCEDURE — 99213 OFFICE O/P EST LOW 20 MIN: CPT | Performed by: INTERNAL MEDICINE

## 2025-07-09 PROCEDURE — G8427 DOCREV CUR MEDS BY ELIG CLIN: HCPCS | Performed by: INTERNAL MEDICINE

## 2025-07-09 PROCEDURE — 1126F AMNT PAIN NOTED NONE PRSNT: CPT | Performed by: INTERNAL MEDICINE

## 2025-07-09 PROCEDURE — 1036F TOBACCO NON-USER: CPT | Performed by: INTERNAL MEDICINE

## 2025-07-09 PROCEDURE — G8419 CALC BMI OUT NRM PARAM NOF/U: HCPCS | Performed by: INTERNAL MEDICINE

## 2025-07-09 PROCEDURE — 1123F ACP DISCUSS/DSCN MKR DOCD: CPT | Performed by: INTERNAL MEDICINE

## 2025-07-09 NOTE — PROGRESS NOTES
I have reviewed all needed documentation in preparation for visit. Verified patient by name and date of birth  Chief Complaint   Patient presents with    1 week follow up       There were no vitals filed for this visit.    Health Maintenance Due   Topic Date Due    Prostate Specific Antigen (PSA) Screening or Monitoring  Never done    COVID-19 Vaccine (7 - 2024-25 season) 09/01/2024    Annual Wellness Visit (Medicare Advantage)  Never done     \"Have you been to the ER, urgent care clinic since your last visit?  Hospitalized since your last visit?\"    NO    “Have you seen or consulted any other health care providers outside of [facility] since your last visit?”    NO          Click Here for Release of Records Request         Neida Davis [facility]

## 2025-07-09 NOTE — PROGRESS NOTES
Oswaldo Vogt is a 89 y.o. male  Chief Complaint   Patient presents with    1 week follow up     Sxs Rt side nipple pain        Vitals:    07/09/25 0936   BP: 138/78   Pulse: 66   Resp: 16   Temp: 97.4 °F (36.3 °C)   SpO2: 96%          HPI  Patient presents with right nipple pain and tenderness for over one month. He reports that the discomfort is gradually improving. There is no history of trauma or injury. The pain is localized and tender to palpation, but not reproducible with muscle movement.  He expresses concern due to a male friend recently diagnosed with breast cancer, and wants to know if this could be a sign of something serious.        .  Past Medical History:   Diagnosis Date    Abnormal PSA 05/09/2011    Arrhythmia     OCCASIONAL IRREGULAR BEAT    BPH (benign prostatic hyperplasia)     CAD (coronary artery disease)     CALCIUM BUILD-UP NOTED    Cancer (HCC) 04/2019    PROSTATE    Carotid artery disease 04/14/2014    ED (erectile dysfunction)     GERD (gastroesophageal reflux disease) 5/21/2012    Hypercholesterolemia     Hypertension     Pancreatic cyst 10/21/2013    Rhinitis 8/23/2010    Sarcopenia 7/15/2013            ROS  Review of Systems   Constitutional:  Negative for fever.   Respiratory:  Negative for cough and shortness of breath.    Cardiovascular:  Negative for chest pain and palpitations.   Neurological:  Negative for headaches.   Psychiatric/Behavioral:  Negative for dysphoric mood.            EXAM  Physical Exam  Vitals reviewed.   Constitutional:       Appearance: Normal appearance.   Cardiovascular:      Rate and Rhythm: Normal rate.   Chest:   Breasts:     Right: Tenderness present.      Left: Normal.       Neurological:      Mental Status: He is alert.       On exam, there is mild sensitivity to touch around the right nipple and areola, but no palpable masses, nodules, or significant asymmetry. No skin changes, nipple discharge, or lymphadenopathy noted.   Health Maintenance Due

## 2025-07-10 ASSESSMENT — ENCOUNTER SYMPTOMS
SHORTNESS OF BREATH: 0
COUGH: 0

## 2025-08-20 ENCOUNTER — OFFICE VISIT (OUTPATIENT)
Age: 89
End: 2025-08-20
Payer: MEDICARE

## 2025-08-20 VITALS
SYSTOLIC BLOOD PRESSURE: 124 MMHG | WEIGHT: 169.3 LBS | DIASTOLIC BLOOD PRESSURE: 78 MMHG | HEIGHT: 68 IN | OXYGEN SATURATION: 96 % | BODY MASS INDEX: 25.66 KG/M2 | HEART RATE: 46 BPM

## 2025-08-20 DIAGNOSIS — R94.31 ABNORMAL ELECTROCARDIOGRAPHY: ICD-10-CM

## 2025-08-20 DIAGNOSIS — I48.11 LONGSTANDING PERSISTENT ATRIAL FIBRILLATION (HCC): Primary | ICD-10-CM

## 2025-08-20 DIAGNOSIS — I25.10 CORONARY ARTERY DISEASE INVOLVING NATIVE CORONARY ARTERY OF NATIVE HEART WITHOUT ANGINA PECTORIS: ICD-10-CM

## 2025-08-20 PROCEDURE — G8427 DOCREV CUR MEDS BY ELIG CLIN: HCPCS | Performed by: INTERNAL MEDICINE

## 2025-08-20 PROCEDURE — 1036F TOBACCO NON-USER: CPT | Performed by: INTERNAL MEDICINE

## 2025-08-20 PROCEDURE — 1123F ACP DISCUSS/DSCN MKR DOCD: CPT | Performed by: INTERNAL MEDICINE

## 2025-08-20 PROCEDURE — G8419 CALC BMI OUT NRM PARAM NOF/U: HCPCS | Performed by: INTERNAL MEDICINE

## 2025-08-20 PROCEDURE — 99214 OFFICE O/P EST MOD 30 MIN: CPT | Performed by: INTERNAL MEDICINE

## 2025-08-20 PROCEDURE — 93010 ELECTROCARDIOGRAM REPORT: CPT | Performed by: INTERNAL MEDICINE

## 2025-08-20 PROCEDURE — 1159F MED LIST DOCD IN RCRD: CPT | Performed by: INTERNAL MEDICINE

## 2025-08-20 PROCEDURE — 1126F AMNT PAIN NOTED NONE PRSNT: CPT | Performed by: INTERNAL MEDICINE

## 2025-08-20 PROCEDURE — 93005 ELECTROCARDIOGRAM TRACING: CPT | Performed by: INTERNAL MEDICINE

## 2025-08-20 ASSESSMENT — PATIENT HEALTH QUESTIONNAIRE - PHQ9
1. LITTLE INTEREST OR PLEASURE IN DOING THINGS: NOT AT ALL
SUM OF ALL RESPONSES TO PHQ QUESTIONS 1-9: 0
2. FEELING DOWN, DEPRESSED OR HOPELESS: NOT AT ALL
SUM OF ALL RESPONSES TO PHQ QUESTIONS 1-9: 0

## 2025-08-29 ENCOUNTER — ANCILLARY PROCEDURE (OUTPATIENT)
Age: 89
End: 2025-08-29
Payer: MEDICARE

## 2025-08-29 VITALS
WEIGHT: 169 LBS | HEART RATE: 52 BPM | SYSTOLIC BLOOD PRESSURE: 122 MMHG | HEIGHT: 68 IN | DIASTOLIC BLOOD PRESSURE: 78 MMHG | BODY MASS INDEX: 25.61 KG/M2

## 2025-08-29 DIAGNOSIS — R94.31 ABNORMAL ELECTROCARDIOGRAPHY: ICD-10-CM

## 2025-08-29 LAB
ECHO BSA: 1.92 M2
STRESS ANGINA INDEX: 0
STRESS BASELINE DIAS BP: 78 MMHG
STRESS BASELINE HR: 52 BPM
STRESS BASELINE SYS BP: 122 MMHG
STRESS ESTIMATED WORKLOAD: 5.3 METS
STRESS EXERCISE DUR MIN: 12 MIN
STRESS EXERCISE DUR SEC: 11 SEC
STRESS O2 SAT PEAK: 97 %
STRESS O2 SAT REST: 96 %
STRESS PEAK DIAS BP: 72 MMHG
STRESS PEAK SYS BP: 150 MMHG
STRESS PERCENT HR ACHIEVED: 75 %
STRESS POST PEAK HR: 97 BPM
STRESS RATE PRESSURE PRODUCT: NORMAL BPM*MMHG
STRESS TARGET HR: 130 BPM

## 2025-08-29 PROCEDURE — 93017 CV STRESS TEST TRACING ONLY: CPT | Performed by: INTERNAL MEDICINE

## 2025-09-04 ENCOUNTER — RESULTS FOLLOW-UP (OUTPATIENT)
Age: 89
End: 2025-09-04

## (undated) DEVICE — ZIMMER® STERILE DISPOSABLE TOURNIQUET CUFF WITH PLC, DUAL PORT, SINGLE BLADDER, 34 IN. (86 CM)

## (undated) DEVICE — SYRINGE MED 20ML STD CLR PLAS LUERLOCK TIP N CTRL DISP

## (undated) DEVICE — CONTAINER,SPEC,PNEUM TUBE,3OZ,STRL PATH: Brand: MEDLINE

## (undated) DEVICE — SUTURE ETHBND EXCEL SZ 1 L30IN NONABSORBABLE GRN L36MM CT-1 X425H

## (undated) DEVICE — Device

## (undated) DEVICE — GARMENT,MEDLINE,DVT,INT,CALF,MED, GEN2: Brand: MEDLINE

## (undated) DEVICE — 3M™ IOBAN™ 2 ANTIMICROBIAL INCISE DRAPE 6651EZ: Brand: IOBAN™ 2

## (undated) DEVICE — STERILE POLYISOPRENE POWDER-FREE SURGICAL GLOVES WITH EMOLLIENT COATING: Brand: PROTEXIS

## (undated) DEVICE — 4-PORT MANIFOLD: Brand: NEPTUNE 2

## (undated) DEVICE — BANDAGE COMPR M W6INXL10YD WHT BGE VELC E MTRX HK AND LOOP

## (undated) DEVICE — SUTURE VCRL SZ 2-0 L36IN ABSRB UD L40MM CT 1/2 CIR J957H

## (undated) DEVICE — COVER LT HNDL PLAS RIG 1 PER PK

## (undated) DEVICE — PREP SKN PREVAIL 40ML APPL --

## (undated) DEVICE — STRIP,CLOSURE,WOUND,MEDI-STRIP,1/2X4: Brand: MEDLINE

## (undated) DEVICE — STERILE POLYISOPRENE POWDER-FREE SURGICAL GLOVES: Brand: PROTEXIS

## (undated) DEVICE — INFECTION CONTROL KIT SYS

## (undated) DEVICE — STRAP,POSITIONING,KNEE/BODY,FOAM,4X60": Brand: MEDLINE

## (undated) DEVICE — STRYKER PERFORMANCE SERIES SAGITTAL BLADE: Brand: STRYKER PERFORMANCE SERIES

## (undated) DEVICE — SUTURE MCRYL SZ 3-0 L27IN ABSRB UD L24MM PS-1 3/8 CIR PRIM Y936H

## (undated) DEVICE — SYR LR LCK 1ML GRAD NSAF 30ML --

## (undated) DEVICE — HANDPIECE SET WITH BONE CLEANING TIP AND SUCTION TUBE: Brand: INTERPULSE

## (undated) DEVICE — CARTRIDGE BNE CEM MIX UNIV TWR VAC ROTOR BRK OFF NOZ W/O

## (undated) DEVICE — SUTURE STRATAFIX SYMMETRIC PDS + SZ 1 L18IN ABSRB VLT L48MM SXPP1A400

## (undated) DEVICE — SOLUTION IRRIG 3000ML 0.9% SOD CHL FLX CONT 0797208] ICU MEDICAL INC]

## (undated) DEVICE — SCRUB DRY SURG EZ SCRUB BRUSH PREOPERATIVE GRN

## (undated) DEVICE — TRAY CATH 16F URIN MTR LTX -- CONVERT TO ITEM 363111

## (undated) DEVICE — NEEDLE HYPO 21GA L1.5IN INTRAMUSCULAR S STL LATCH BVL UP

## (undated) DEVICE — CUSTOM CAST PD STR

## (undated) DEVICE — SOLUTION IRRIG 1000ML H2O STRL BLT

## (undated) DEVICE — REM POLYHESIVE ADULT PATIENT RETURN ELECTRODE: Brand: VALLEYLAB